# Patient Record
Sex: MALE | Race: WHITE | URBAN - METROPOLITAN AREA
[De-identification: names, ages, dates, MRNs, and addresses within clinical notes are randomized per-mention and may not be internally consistent; named-entity substitution may affect disease eponyms.]

---

## 2017-03-23 PROBLEM — Z00.00 ENCOUNTER FOR PREVENTIVE HEALTH EXAMINATION: Status: ACTIVE | Noted: 2017-03-23

## 2017-12-29 ENCOUNTER — EMERGENCY (EMERGENCY)
Facility: HOSPITAL | Age: 67
LOS: 0 days | Discharge: HOME | End: 2017-12-30
Attending: EMERGENCY MEDICINE | Admitting: EMERGENCY MEDICINE
Payer: COMMERCIAL

## 2017-12-29 VITALS
SYSTOLIC BLOOD PRESSURE: 146 MMHG | DIASTOLIC BLOOD PRESSURE: 85 MMHG | TEMPERATURE: 98 F | OXYGEN SATURATION: 100 % | HEART RATE: 85 BPM | HEIGHT: 70 IN | WEIGHT: 212.08 LBS | RESPIRATION RATE: 18 BRPM

## 2017-12-29 DIAGNOSIS — R42 DIZZINESS AND GIDDINESS: ICD-10-CM

## 2017-12-29 LAB
ALBUMIN SERPL ELPH-MCNC: 3.3 G/DL — SIGNIFICANT CHANGE UP (ref 3.3–5)
ALP SERPL-CCNC: 91 U/L — SIGNIFICANT CHANGE UP (ref 40–120)
ALT FLD-CCNC: 26 U/L — SIGNIFICANT CHANGE UP (ref 12–78)
ANION GAP SERPL CALC-SCNC: 6 MMOL/L — SIGNIFICANT CHANGE UP (ref 5–17)
AST SERPL-CCNC: 14 U/L — LOW (ref 15–37)
BASOPHILS # BLD AUTO: 0.1 K/UL — SIGNIFICANT CHANGE UP (ref 0–0.2)
BASOPHILS NFR BLD AUTO: 0.5 % — SIGNIFICANT CHANGE UP (ref 0–2)
BILIRUB SERPL-MCNC: 0.5 MG/DL — SIGNIFICANT CHANGE UP (ref 0.2–1.2)
BUN SERPL-MCNC: 25 MG/DL — HIGH (ref 7–23)
CALCIUM SERPL-MCNC: 8.7 MG/DL — SIGNIFICANT CHANGE UP (ref 8.5–10.1)
CHLORIDE SERPL-SCNC: 102 MMOL/L — SIGNIFICANT CHANGE UP (ref 96–108)
CO2 SERPL-SCNC: 26 MMOL/L — SIGNIFICANT CHANGE UP (ref 22–31)
CREAT SERPL-MCNC: 0.86 MG/DL — SIGNIFICANT CHANGE UP (ref 0.5–1.3)
EOSINOPHIL # BLD AUTO: 0 K/UL — SIGNIFICANT CHANGE UP (ref 0–0.5)
EOSINOPHIL NFR BLD AUTO: 0.2 % — SIGNIFICANT CHANGE UP (ref 0–6)
GLUCOSE SERPL-MCNC: 118 MG/DL — HIGH (ref 70–99)
HCT VFR BLD CALC: 45.1 % — SIGNIFICANT CHANGE UP (ref 39–50)
HGB BLD-MCNC: 14.8 G/DL — SIGNIFICANT CHANGE UP (ref 13–17)
LYMPHOCYTES # BLD AUTO: 1.1 K/UL — SIGNIFICANT CHANGE UP (ref 1–3.3)
LYMPHOCYTES # BLD AUTO: 8.3 % — LOW (ref 13–44)
MAGNESIUM SERPL-MCNC: 2.2 MG/DL — SIGNIFICANT CHANGE UP (ref 1.6–2.6)
MCHC RBC-ENTMCNC: 28.9 PG — SIGNIFICANT CHANGE UP (ref 27–34)
MCHC RBC-ENTMCNC: 32.7 GM/DL — SIGNIFICANT CHANGE UP (ref 32–36)
MCV RBC AUTO: 88.2 FL — SIGNIFICANT CHANGE UP (ref 80–100)
MONOCYTES # BLD AUTO: 1 K/UL — HIGH (ref 0–0.9)
MONOCYTES NFR BLD AUTO: 7.3 % — SIGNIFICANT CHANGE UP (ref 2–14)
NEUTROPHILS # BLD AUTO: 11.6 K/UL — HIGH (ref 1.8–7.4)
NEUTROPHILS NFR BLD AUTO: 83.7 % — HIGH (ref 43–77)
PHOSPHATE SERPL-MCNC: 2.5 MG/DL — SIGNIFICANT CHANGE UP (ref 2.5–4.5)
PLATELET # BLD AUTO: 265 K/UL — SIGNIFICANT CHANGE UP (ref 150–400)
POTASSIUM SERPL-MCNC: 4.2 MMOL/L — SIGNIFICANT CHANGE UP (ref 3.5–5.3)
POTASSIUM SERPL-SCNC: 4.2 MMOL/L — SIGNIFICANT CHANGE UP (ref 3.5–5.3)
PROT SERPL-MCNC: 6.8 GM/DL — SIGNIFICANT CHANGE UP (ref 6–8.3)
RBC # BLD: 5.11 M/UL — SIGNIFICANT CHANGE UP (ref 4.2–5.8)
RBC # FLD: 14.1 % — SIGNIFICANT CHANGE UP (ref 10.3–14.5)
SODIUM SERPL-SCNC: 134 MMOL/L — LOW (ref 135–145)
WBC # BLD: 13.8 K/UL — HIGH (ref 3.8–10.5)
WBC # FLD AUTO: 13.8 K/UL — HIGH (ref 3.8–10.5)

## 2017-12-29 PROCEDURE — 93010 ELECTROCARDIOGRAM REPORT: CPT

## 2017-12-29 PROCEDURE — 99283 EMERGENCY DEPT VISIT LOW MDM: CPT

## 2017-12-29 RX ORDER — SODIUM CHLORIDE 9 MG/ML
1000 INJECTION INTRAMUSCULAR; INTRAVENOUS; SUBCUTANEOUS ONCE
Qty: 0 | Refills: 0 | Status: COMPLETED | OUTPATIENT
Start: 2017-12-29 | End: 2017-12-29

## 2017-12-29 RX ADMIN — SODIUM CHLORIDE 1000 MILLILITER(S): 9 INJECTION INTRAMUSCULAR; INTRAVENOUS; SUBCUTANEOUS at 22:23

## 2017-12-29 NOTE — ED PROVIDER NOTE - MEDICAL DECISION MAKING DETAILS
labs, EKG, fluids Mild leukocytosis, likely 2/2 recent URI, on prednisone.  Mild EDWARD.  EKG nonischemic, similar to prior from December 15.  Pt feeling better, asymptomatic VS WNL.  Okay for d/c home at this time.

## 2017-12-29 NOTE — ED ADULT NURSE NOTE - NS ED NURSE DC INFO COMPLEXITY
Patient asked questions/Straightforward: Basic instructions, no meds, no home treatment/Simple: Patient demonstrates quick and easy understanding/Verbalized Understanding

## 2017-12-29 NOTE — ED PROVIDER NOTE - OBJECTIVE STATEMENT
68 y/o male with PMHx of HTN, HLD presents to the ED c/o HTN of 166/110. Pt states his fingers were cramping and he felt dizzy. +lightheadedness. Adequate PO intake. Pt feels better at this time. Notes he is dealing with a lot of stress in his life. Is on Lisinopril. No hx of DM.

## 2017-12-29 NOTE — ED ADULT NURSE NOTE - OBJECTIVE STATEMENT
Pt presents to ER c/o HTN, camping hands and stress. Pt reports decreased sleep since Wednesday and increased stress at home, pt began to feeling cramps in b/l hands and took bp at home and it was elevated. neuro intact in b/l upper and lower extremity, good peripheral sensation. Denies headache/ blurry vision. AO x 3 oriented to baseline, normal breathing pattern with no difficulty.

## 2019-06-23 ENCOUNTER — EMERGENCY (EMERGENCY)
Facility: HOSPITAL | Age: 69
LOS: 0 days | Discharge: HOME | End: 2019-06-23
Attending: EMERGENCY MEDICINE | Admitting: EMERGENCY MEDICINE
Payer: COMMERCIAL

## 2019-06-23 VITALS
DIASTOLIC BLOOD PRESSURE: 87 MMHG | HEART RATE: 79 BPM | OXYGEN SATURATION: 97 % | RESPIRATION RATE: 16 BRPM | TEMPERATURE: 98 F | SYSTOLIC BLOOD PRESSURE: 157 MMHG

## 2019-06-23 VITALS — HEIGHT: 70 IN | WEIGHT: 205.03 LBS

## 2019-06-23 DIAGNOSIS — Z88.0 ALLERGY STATUS TO PENICILLIN: ICD-10-CM

## 2019-06-23 DIAGNOSIS — R10.32 LEFT LOWER QUADRANT PAIN: ICD-10-CM

## 2019-06-23 DIAGNOSIS — N13.2 HYDRONEPHROSIS WITH RENAL AND URETERAL CALCULOUS OBSTRUCTION: ICD-10-CM

## 2019-06-23 DIAGNOSIS — I10 ESSENTIAL (PRIMARY) HYPERTENSION: ICD-10-CM

## 2019-06-23 DIAGNOSIS — E78.5 HYPERLIPIDEMIA, UNSPECIFIED: ICD-10-CM

## 2019-06-23 DIAGNOSIS — Z87.442 PERSONAL HISTORY OF URINARY CALCULI: ICD-10-CM

## 2019-06-23 LAB
ALBUMIN SERPL ELPH-MCNC: 3.8 G/DL — SIGNIFICANT CHANGE UP (ref 3.3–5)
ALP SERPL-CCNC: 123 U/L — HIGH (ref 40–120)
ALT FLD-CCNC: 26 U/L — SIGNIFICANT CHANGE UP (ref 12–78)
ANION GAP SERPL CALC-SCNC: 5 MMOL/L — SIGNIFICANT CHANGE UP (ref 5–17)
APPEARANCE UR: CLEAR — SIGNIFICANT CHANGE UP
AST SERPL-CCNC: 21 U/L — SIGNIFICANT CHANGE UP (ref 15–37)
BACTERIA # UR AUTO: ABNORMAL
BILIRUB SERPL-MCNC: 0.4 MG/DL — SIGNIFICANT CHANGE UP (ref 0.2–1.2)
BILIRUB UR-MCNC: NEGATIVE — SIGNIFICANT CHANGE UP
BUN SERPL-MCNC: 24 MG/DL — HIGH (ref 7–23)
CALCIUM SERPL-MCNC: 9.3 MG/DL — SIGNIFICANT CHANGE UP (ref 8.5–10.1)
CHLORIDE SERPL-SCNC: 103 MMOL/L — SIGNIFICANT CHANGE UP (ref 96–108)
CO2 SERPL-SCNC: 28 MMOL/L — SIGNIFICANT CHANGE UP (ref 22–31)
COLOR SPEC: YELLOW — SIGNIFICANT CHANGE UP
CREAT SERPL-MCNC: 1.29 MG/DL — SIGNIFICANT CHANGE UP (ref 0.5–1.3)
DIFF PNL FLD: ABNORMAL
EPI CELLS # UR: SIGNIFICANT CHANGE UP
GLUCOSE SERPL-MCNC: 99 MG/DL — SIGNIFICANT CHANGE UP (ref 70–99)
GLUCOSE UR QL: NEGATIVE MG/DL — SIGNIFICANT CHANGE UP
HCT VFR BLD CALC: 43.9 % — SIGNIFICANT CHANGE UP (ref 39–50)
HGB BLD-MCNC: 14.5 G/DL — SIGNIFICANT CHANGE UP (ref 13–17)
KETONES UR-MCNC: NEGATIVE — SIGNIFICANT CHANGE UP
LEUKOCYTE ESTERASE UR-ACNC: NEGATIVE — SIGNIFICANT CHANGE UP
LIDOCAIN IGE QN: 171 U/L — SIGNIFICANT CHANGE UP (ref 73–393)
MCHC RBC-ENTMCNC: 29.5 PG — SIGNIFICANT CHANGE UP (ref 27–34)
MCHC RBC-ENTMCNC: 33 GM/DL — SIGNIFICANT CHANGE UP (ref 32–36)
MCV RBC AUTO: 89.4 FL — SIGNIFICANT CHANGE UP (ref 80–100)
NITRITE UR-MCNC: NEGATIVE — SIGNIFICANT CHANGE UP
PH UR: 6 — SIGNIFICANT CHANGE UP (ref 5–8)
PLATELET # BLD AUTO: 267 K/UL — SIGNIFICANT CHANGE UP (ref 150–400)
POTASSIUM SERPL-MCNC: 4.8 MMOL/L — SIGNIFICANT CHANGE UP (ref 3.5–5.3)
POTASSIUM SERPL-SCNC: 4.8 MMOL/L — SIGNIFICANT CHANGE UP (ref 3.5–5.3)
PROT SERPL-MCNC: 7.8 GM/DL — SIGNIFICANT CHANGE UP (ref 6–8.3)
PROT UR-MCNC: NEGATIVE MG/DL — SIGNIFICANT CHANGE UP
RBC # BLD: 4.91 M/UL — SIGNIFICANT CHANGE UP (ref 4.2–5.8)
RBC # FLD: 13.8 % — SIGNIFICANT CHANGE UP (ref 10.3–14.5)
RBC CASTS # UR COMP ASSIST: ABNORMAL /HPF (ref 0–4)
SODIUM SERPL-SCNC: 136 MMOL/L — SIGNIFICANT CHANGE UP (ref 135–145)
SP GR SPEC: 1.01 — SIGNIFICANT CHANGE UP (ref 1.01–1.02)
UROBILINOGEN FLD QL: NEGATIVE MG/DL — SIGNIFICANT CHANGE UP
WBC # BLD: 12.59 K/UL — HIGH (ref 3.8–10.5)
WBC # FLD AUTO: 12.59 K/UL — HIGH (ref 3.8–10.5)
WBC UR QL: SIGNIFICANT CHANGE UP

## 2019-06-23 PROCEDURE — 74176 CT ABD & PELVIS W/O CONTRAST: CPT | Mod: 26

## 2019-06-23 PROCEDURE — 99284 EMERGENCY DEPT VISIT MOD MDM: CPT | Mod: 25

## 2019-06-23 RX ORDER — TAMSULOSIN HYDROCHLORIDE 0.4 MG/1
1 CAPSULE ORAL
Qty: 10 | Refills: 0
Start: 2019-06-23 | End: 2019-07-02

## 2019-06-23 RX ORDER — ONDANSETRON 8 MG/1
1 TABLET, FILM COATED ORAL
Qty: 30 | Refills: 0
Start: 2019-06-23 | End: 2019-07-02

## 2019-06-23 RX ORDER — KETOROLAC TROMETHAMINE 30 MG/ML
30 SYRINGE (ML) INJECTION ONCE
Refills: 0 | Status: DISCONTINUED | OUTPATIENT
Start: 2019-06-23 | End: 2019-06-23

## 2019-06-23 RX ORDER — KETOROLAC TROMETHAMINE 30 MG/ML
1 SYRINGE (ML) INJECTION
Qty: 21 | Refills: 0
Start: 2019-06-23 | End: 2019-06-29

## 2019-06-23 RX ORDER — SODIUM CHLORIDE 9 MG/ML
1000 INJECTION INTRAMUSCULAR; INTRAVENOUS; SUBCUTANEOUS ONCE
Refills: 0 | Status: COMPLETED | OUTPATIENT
Start: 2019-06-23 | End: 2019-06-23

## 2019-06-23 RX ORDER — TAMSULOSIN HYDROCHLORIDE 0.4 MG/1
0.4 CAPSULE ORAL AT BEDTIME
Refills: 0 | Status: DISCONTINUED | OUTPATIENT
Start: 2019-06-23 | End: 2019-06-23

## 2019-06-23 RX ADMIN — TAMSULOSIN HYDROCHLORIDE 0.4 MILLIGRAM(S): 0.4 CAPSULE ORAL at 22:31

## 2019-06-23 RX ADMIN — Medication 30 MILLIGRAM(S): at 20:46

## 2019-06-23 RX ADMIN — SODIUM CHLORIDE 1000 MILLILITER(S): 9 INJECTION INTRAMUSCULAR; INTRAVENOUS; SUBCUTANEOUS at 20:46

## 2019-06-23 NOTE — ED ADULT NURSE NOTE - NSIMPLEMENTINTERV_GEN_ALL_ED
Implemented All Universal Safety Interventions:  [place] to call system. Call bell, personal items and telephone within reach. Instruct patient to call for assistance. Room bathroom lighting operational. Non-slip footwear when patient is off stretcher. Physically safe environment: no spills, clutter or unnecessary equipment. Stretcher in lowest position, wheels locked, appropriate side rails in place.

## 2019-06-23 NOTE — ED STATDOCS - CARE PROVIDER_API CALL
Yun, SukHyeon (MD)  Nephrology  [address], [address]  [place] [address]  Phone: [phone]  Fax: [phone]  Follow Up Time:

## 2019-06-23 NOTE — ED STATDOCS - OBJECTIVE STATEMENT
69 y/o male with a PMHx of HTN, HLD presents to the ED c/o left flank pain since 9:30am this morning. +burning urination. No blood in urine. Denies vomiting. Hx of same flank pain before due to a kidney stones. Hx of surgery for hernia. Denies taking pain medication PTA. PCP: Dr. Rodriguez.

## 2019-06-23 NOTE — ED ADULT NURSE NOTE - OBJECTIVE STATEMENT
Patient presents to ED complaining of flank pain. Patient complaining of non radiating L flank pain x 1 day and burning with urination. Patient denies NVD, fevers, chills. PMHx of kidney stones, patient states he has had similar symptoms in past.

## 2019-06-23 NOTE — ED STATDOCS - ATTENDING CONTRIBUTION TO CARE
Attending Contribution to Care: I, Aimee Mondragon, performed the initial face to face bedside interview with this patient regarding history of present illness, review of symptoms and relevant past medical, social and family history.  I completed an independent physical examination.  I was the initial provider who evaluated this patient. I have signed out the follow up of any pending tests (i.e. labs, radiological studies) to the ACP.  I have communicated the patient’s plan of care and disposition with the ACP.

## 2019-06-23 NOTE — ED STATDOCS - PROGRESS NOTE DETAILS
Pt is a 69 y/o M with PMH HTN, HLD, kidney stones p/w left flank pain since around 9AM with associated burning during urination. Denies blood in urine or stool. Reports nausea but no vomiting. He has not taken anything for pain PTA.   PE: left cva ttp.  plan: labs, ct, pain control, reeval  -Waleska Uribe PA-C ED evaluation and management discussed with the patient and family in detail. Will dc with pain and nausea control, flomax, urology referral.  Close PMD follow up encouraged.  Strict ED return instructions discussed in detail and patient given the opportunity to ask any questions about their discharge diagnosis and instructions. Patient verbalized understanding. -Waleska Uribe PA-C Pt. urinated prior to discharge and passed his stone intact. Pt. now pain free.

## 2019-06-24 PROBLEM — I10 ESSENTIAL (PRIMARY) HYPERTENSION: Chronic | Status: ACTIVE | Noted: 2017-12-30

## 2019-06-24 LAB
CULTURE RESULTS: NO GROWTH — SIGNIFICANT CHANGE UP
SPECIMEN SOURCE: SIGNIFICANT CHANGE UP

## 2019-08-15 ENCOUNTER — APPOINTMENT (OUTPATIENT)
Age: 69
End: 2019-08-15
Payer: COMMERCIAL

## 2019-08-15 VITALS
BODY MASS INDEX: 29.02 KG/M2 | HEIGHT: 70.5 IN | TEMPERATURE: 97.9 F | HEART RATE: 65 BPM | SYSTOLIC BLOOD PRESSURE: 131 MMHG | DIASTOLIC BLOOD PRESSURE: 73 MMHG | OXYGEN SATURATION: 96 % | WEIGHT: 205 LBS

## 2019-08-15 DIAGNOSIS — K46.9 UNSPECIFIED ABDOMINAL HERNIA W/OUT OBSTRUCTION OR GANGRENE: ICD-10-CM

## 2019-08-15 DIAGNOSIS — Z87.442 PERSONAL HISTORY OF URINARY CALCULI: ICD-10-CM

## 2019-08-15 PROCEDURE — 99204 OFFICE O/P NEW MOD 45 MIN: CPT

## 2019-08-15 RX ORDER — NIZATIDINE 150 MG/1
150 CAPSULE ORAL
Refills: 0 | Status: ACTIVE | COMMUNITY

## 2019-08-15 RX ORDER — GEMFIBROZIL 600 MG/1
600 TABLET, FILM COATED ORAL
Refills: 0 | Status: ACTIVE | COMMUNITY

## 2019-08-15 RX ORDER — ZOLPIDEM TARTRATE 12.5 MG/1
12.5 TABLET, EXTENDED RELEASE ORAL
Refills: 0 | Status: ACTIVE | COMMUNITY

## 2019-08-15 NOTE — PHYSICAL EXAM
[General Appearance - Well Developed] : well developed [General Appearance - Well Nourished] : well nourished [Normal Appearance] : normal appearance [Well Groomed] : well groomed [General Appearance - In No Acute Distress] : no acute distress [Edema] : no peripheral edema [Respiration, Rhythm And Depth] : normal respiratory rhythm and effort [Exaggerated Use Of Accessory Muscles For Inspiration] : no accessory muscle use [Abdomen Soft] : soft [Abdomen Tenderness] : non-tender [Costovertebral Angle Tenderness] : no ~M costovertebral angle tenderness [Urethral Meatus] : meatus normal [Penis Abnormality] : normal circumcised penis [Urinary Bladder Findings] : the bladder was normal on palpation [Scrotum] : the scrotum was normal [Testes Tenderness] : no tenderness of the testes [Testes Mass (___cm)] : there were no testicular masses [Prostate Tenderness] : the prostate was not tender [No Prostate Nodules] : no prostate nodules [Prostate Size ___ gm] : prostate size [unfilled] gm [Normal Station and Gait] : the gait and station were normal for the patient's age [] : no rash [No Focal Deficits] : no focal deficits [Oriented To Time, Place, And Person] : oriented to person, place, and time [Affect] : the affect was normal [Mood] : the mood was normal [Not Anxious] : not anxious [No Palpable Adenopathy] : no palpable adenopathy

## 2019-08-15 NOTE — ASSESSMENT
[FreeTextEntry1] : 67 yo M with LEFT renal colic and 2 mm UVJ stone. Likely passed stone. Residual pain may be another stone, as imaging did show he had others. Suspect that this is simply some pain from passage of initial stone. Recommended he continue tamsulosin that he was given by ER. Rx refill given. This may also help his LUTS, though pt does not want long term medication for LUTS. Will obtain renal US and KUB to confirm passage and rule out additional obstructing stone.

## 2019-08-15 NOTE — REVIEW OF SYSTEMS
[Feeling Poorly] : feeling poorly [Feeling Tired] : feeling tired [Shortness Of Breath] : shortness of breath [Heartburn] : heartburn [Poor quality erections] : Poor quality erections [Pain during urination] : pain during urination [History of kidney stones] : history of kidney stones [Urine retention] : urine retention [Wake up at night to urinate  How many times?  ___] : wakes up to urinate [unfilled] times during the night [Wait a long time to urinate] : waits a long time to urinate [Slow urine stream] : slow urine stream [Interrupted urine stream] : interrupted urine stream [Itching] : itching [Dizziness] : dizziness [Feelings Of Weakness] : feelings of weakness [Negative] : Heme/Lymph

## 2019-08-15 NOTE — HISTORY OF PRESENT ILLNESS
[FreeTextEntry1] : 68 year old man seen 08/15/2019 with complaint of LEFT ureteral stone. This began days ago, when he presented to  with LEFT flank pain.  He passed stone at  ER in his urine. \par It was severe in severity, but has now nearly resolved. Nothing makes the symptoms better, nothing makes sx worse. He does have some mild residual LEFT flank pain. \par It is associated with nothing. CT at  revealed 2 mm LEFT UVJ stone as well as several intrarenal stones. He reports baseline LUTS of frequency, nocturia, weak stream, and hesitancy. \par No hematuria, no dysuria. No incontinence. No fevers, no chills, no nausea, no vomiting. \par \par No family history contributory to kidney or ureteral stones.

## 2019-08-23 LAB
APPEARANCE: CLEAR
BACTERIA UR CULT: NORMAL
BACTERIA: NEGATIVE
BILIRUBIN URINE: NEGATIVE
BLOOD URINE: NEGATIVE
COLOR: YELLOW
GLUCOSE QUALITATIVE U: NEGATIVE
HYALINE CASTS: 1 /LPF
KETONES URINE: NEGATIVE
LEUKOCYTE ESTERASE URINE: NEGATIVE
MICROSCOPIC-UA: NORMAL
NITRITE URINE: NEGATIVE
PH URINE: 6
PROTEIN URINE: NORMAL
RED BLOOD CELLS URINE: 1 /HPF
SPECIFIC GRAVITY URINE: 1.03
SQUAMOUS EPITHELIAL CELLS: 0 /HPF
UROBILINOGEN URINE: NORMAL
WHITE BLOOD CELLS URINE: 1 /HPF

## 2019-09-17 ENCOUNTER — APPOINTMENT (OUTPATIENT)
Age: 69
End: 2019-09-17
Payer: COMMERCIAL

## 2019-09-17 DIAGNOSIS — N20.0 CALCULUS OF KIDNEY: ICD-10-CM

## 2019-09-17 DIAGNOSIS — N20.1 CALCULUS OF URETER: ICD-10-CM

## 2019-09-17 PROCEDURE — 99213 OFFICE O/P EST LOW 20 MIN: CPT

## 2019-09-19 PROBLEM — N20.0 KIDNEY STONE: Status: ACTIVE | Noted: 2019-08-15

## 2019-09-19 PROBLEM — N20.1 URETERAL STONE: Status: ACTIVE | Noted: 2019-08-15

## 2019-09-19 NOTE — HISTORY OF PRESENT ILLNESS
[FreeTextEntry1] : 68 year old man seen 08/15/2019 with complaint of LEFT ureteral stone. This began days ago, when he presented to  with LEFT flank pain.  He passed stone at  ER in his urine. \par It was severe in severity, but has now nearly resolved. Nothing makes the symptoms better, nothing makes sx worse. He does have some mild residual LEFT flank pain. \par It is associated with nothing. CT at  revealed 2 mm LEFT UVJ stone as well as several intrarenal stones. He reports baseline LUTS of frequency, nocturia, weak stream, and hesitancy. \par No hematuria, no dysuria. No incontinence. No fevers, no chills, no nausea, no vomiting. No family history contributory to kidney or ureteral stones. \par \par 09/17/2019: Patient presents for follow up. He reports  symptoms and other medical  issues remain unchanged from above. KUB xray shows no stone. No hematuria, no dysuria. No incontinence. No fevers, no chills, no nausea, no vomiting.

## 2019-09-19 NOTE — PHYSICAL EXAM
[General Appearance - Well Nourished] : well nourished [General Appearance - Well Developed] : well developed [Normal Appearance] : normal appearance [Well Groomed] : well groomed [General Appearance - In No Acute Distress] : no acute distress [Abdomen Soft] : soft [Abdomen Tenderness] : non-tender [Costovertebral Angle Tenderness] : no ~M costovertebral angle tenderness [Edema] : no peripheral edema [Respiration, Rhythm And Depth] : normal respiratory rhythm and effort [] : no respiratory distress [Oriented To Time, Place, And Person] : oriented to person, place, and time [Exaggerated Use Of Accessory Muscles For Inspiration] : no accessory muscle use [Affect] : the affect was normal [Mood] : the mood was normal [Not Anxious] : not anxious [Normal Station and Gait] : the gait and station were normal for the patient's age [No Focal Deficits] : no focal deficits [No Palpable Adenopathy] : no palpable adenopathy [Urinary Bladder Findings] : the bladder was normal on palpation

## 2019-09-19 NOTE — ASSESSMENT
[FreeTextEntry1] : 69 yo M with LEFT renal colic and 2 mm UVJ stone. Likely passed stone. KUB did not show stones. Pt reports no change in LUTS with tamsulosin and does not want to continue or further work up or treat LUTS.

## 2020-05-14 ENCOUNTER — TRANSCRIPTION ENCOUNTER (OUTPATIENT)
Age: 70
End: 2020-05-14

## 2020-06-01 ENCOUNTER — RX CHANGE (OUTPATIENT)
Age: 70
End: 2020-06-01

## 2020-06-06 ENCOUNTER — TRANSCRIPTION ENCOUNTER (OUTPATIENT)
Age: 70
End: 2020-06-06

## 2020-06-06 ENCOUNTER — EMERGENCY (EMERGENCY)
Facility: HOSPITAL | Age: 70
LOS: 0 days | Discharge: HOME | End: 2020-06-06
Attending: EMERGENCY MEDICINE
Payer: MEDICARE

## 2020-06-06 VITALS — WEIGHT: 194.01 LBS

## 2020-06-06 VITALS — OXYGEN SATURATION: 98 % | DIASTOLIC BLOOD PRESSURE: 69 MMHG | HEART RATE: 67 BPM | SYSTOLIC BLOOD PRESSURE: 137 MMHG

## 2020-06-06 DIAGNOSIS — R42 DIZZINESS AND GIDDINESS: ICD-10-CM

## 2020-06-06 DIAGNOSIS — Z88.0 ALLERGY STATUS TO PENICILLIN: ICD-10-CM

## 2020-06-06 DIAGNOSIS — E78.5 HYPERLIPIDEMIA, UNSPECIFIED: ICD-10-CM

## 2020-06-06 DIAGNOSIS — I10 ESSENTIAL (PRIMARY) HYPERTENSION: ICD-10-CM

## 2020-06-06 DIAGNOSIS — R53.1 WEAKNESS: ICD-10-CM

## 2020-06-06 LAB
ALBUMIN SERPL ELPH-MCNC: 4.1 G/DL — SIGNIFICANT CHANGE UP (ref 3.3–5)
ALP SERPL-CCNC: 101 U/L — SIGNIFICANT CHANGE UP (ref 40–120)
ALT FLD-CCNC: 27 U/L — SIGNIFICANT CHANGE UP (ref 12–78)
ANION GAP SERPL CALC-SCNC: 2 MMOL/L — LOW (ref 5–17)
APPEARANCE UR: CLEAR — SIGNIFICANT CHANGE UP
AST SERPL-CCNC: 13 U/L — LOW (ref 15–37)
BILIRUB SERPL-MCNC: 0.6 MG/DL — SIGNIFICANT CHANGE UP (ref 0.2–1.2)
BILIRUB UR-MCNC: NEGATIVE — SIGNIFICANT CHANGE UP
BUN SERPL-MCNC: 17 MG/DL — SIGNIFICANT CHANGE UP (ref 7–23)
CALCIUM SERPL-MCNC: 10.3 MG/DL — HIGH (ref 8.5–10.1)
CHLORIDE SERPL-SCNC: 104 MMOL/L — SIGNIFICANT CHANGE UP (ref 96–108)
CO2 SERPL-SCNC: 30 MMOL/L — SIGNIFICANT CHANGE UP (ref 22–31)
COLOR SPEC: YELLOW — SIGNIFICANT CHANGE UP
CREAT SERPL-MCNC: 1.09 MG/DL — SIGNIFICANT CHANGE UP (ref 0.5–1.3)
DIFF PNL FLD: NEGATIVE — SIGNIFICANT CHANGE UP
GLUCOSE SERPL-MCNC: 96 MG/DL — SIGNIFICANT CHANGE UP (ref 70–99)
GLUCOSE UR QL: NEGATIVE MG/DL — SIGNIFICANT CHANGE UP
HCT VFR BLD CALC: 44.2 % — SIGNIFICANT CHANGE UP (ref 39–50)
HGB BLD-MCNC: 14.5 G/DL — SIGNIFICANT CHANGE UP (ref 13–17)
KETONES UR-MCNC: NEGATIVE — SIGNIFICANT CHANGE UP
LACTATE SERPL-SCNC: 1.3 MMOL/L — SIGNIFICANT CHANGE UP (ref 0.7–2)
LEUKOCYTE ESTERASE UR-ACNC: NEGATIVE — SIGNIFICANT CHANGE UP
MCHC RBC-ENTMCNC: 29.9 PG — SIGNIFICANT CHANGE UP (ref 27–34)
MCHC RBC-ENTMCNC: 32.8 GM/DL — SIGNIFICANT CHANGE UP (ref 32–36)
MCV RBC AUTO: 91.1 FL — SIGNIFICANT CHANGE UP (ref 80–100)
NITRITE UR-MCNC: NEGATIVE — SIGNIFICANT CHANGE UP
PH UR: 6 — SIGNIFICANT CHANGE UP (ref 5–8)
PLATELET # BLD AUTO: 247 K/UL — SIGNIFICANT CHANGE UP (ref 150–400)
POTASSIUM SERPL-MCNC: 4.8 MMOL/L — SIGNIFICANT CHANGE UP (ref 3.5–5.3)
POTASSIUM SERPL-SCNC: 4.8 MMOL/L — SIGNIFICANT CHANGE UP (ref 3.5–5.3)
PROT SERPL-MCNC: 8.3 GM/DL — SIGNIFICANT CHANGE UP (ref 6–8.3)
PROT UR-MCNC: NEGATIVE MG/DL — SIGNIFICANT CHANGE UP
RBC # BLD: 4.85 M/UL — SIGNIFICANT CHANGE UP (ref 4.2–5.8)
RBC # FLD: 14.1 % — SIGNIFICANT CHANGE UP (ref 10.3–14.5)
SODIUM SERPL-SCNC: 136 MMOL/L — SIGNIFICANT CHANGE UP (ref 135–145)
SP GR SPEC: 1.02 — SIGNIFICANT CHANGE UP (ref 1.01–1.02)
TROPONIN I SERPL-MCNC: <0.015 NG/ML — SIGNIFICANT CHANGE UP (ref 0.01–0.04)
UROBILINOGEN FLD QL: NEGATIVE MG/DL — SIGNIFICANT CHANGE UP
WBC # BLD: 8.73 K/UL — SIGNIFICANT CHANGE UP (ref 3.8–10.5)
WBC # FLD AUTO: 8.73 K/UL — SIGNIFICANT CHANGE UP (ref 3.8–10.5)

## 2020-06-06 PROCEDURE — 99284 EMERGENCY DEPT VISIT MOD MDM: CPT | Mod: 25

## 2020-06-06 PROCEDURE — 83605 ASSAY OF LACTIC ACID: CPT

## 2020-06-06 PROCEDURE — 70450 CT HEAD/BRAIN W/O DYE: CPT | Mod: 26

## 2020-06-06 PROCEDURE — 85027 COMPLETE CBC AUTOMATED: CPT

## 2020-06-06 PROCEDURE — 84484 ASSAY OF TROPONIN QUANT: CPT

## 2020-06-06 PROCEDURE — 36415 COLL VENOUS BLD VENIPUNCTURE: CPT

## 2020-06-06 PROCEDURE — 71045 X-RAY EXAM CHEST 1 VIEW: CPT

## 2020-06-06 PROCEDURE — 80053 COMPREHEN METABOLIC PANEL: CPT

## 2020-06-06 PROCEDURE — 70450 CT HEAD/BRAIN W/O DYE: CPT

## 2020-06-06 PROCEDURE — 71045 X-RAY EXAM CHEST 1 VIEW: CPT | Mod: 26

## 2020-06-06 PROCEDURE — 81003 URINALYSIS AUTO W/O SCOPE: CPT

## 2020-06-06 PROCEDURE — 93010 ELECTROCARDIOGRAM REPORT: CPT

## 2020-06-06 PROCEDURE — 99284 EMERGENCY DEPT VISIT MOD MDM: CPT

## 2020-06-06 PROCEDURE — 93005 ELECTROCARDIOGRAM TRACING: CPT

## 2020-06-06 RX ORDER — SODIUM CHLORIDE 9 MG/ML
1000 INJECTION INTRAMUSCULAR; INTRAVENOUS; SUBCUTANEOUS ONCE
Refills: 0 | Status: COMPLETED | OUTPATIENT
Start: 2020-06-06 | End: 2020-06-06

## 2020-06-06 RX ADMIN — SODIUM CHLORIDE 1000 MILLILITER(S): 9 INJECTION INTRAMUSCULAR; INTRAVENOUS; SUBCUTANEOUS at 18:02

## 2020-06-06 NOTE — ED STATDOCS - PATIENT PORTAL LINK FT
You can access the FollowMyHealth Patient Portal offered by [facility] by registering at the following website: [email or web address]. By joining [employer]’s FollowMyHealth portal, you will also be able to view your health information using other applications (apps) compatible with our system.

## 2020-06-06 NOTE — ED STATDOCS - OBJECTIVE STATEMENT
68 y/o m with a PMHx of HTN presents to ED c/o dizziness and weakness x3 days, noted bp was 80/50 this am at home. Pt went to [facility] urgent care, had EKG with sinus arrythmia, normal bp, denies infectious sx, cough, fevers, and chills. No chest pain or SOB.

## 2020-06-06 NOTE — ED ADULT NURSE NOTE - NSIMPLEMENTINTERV_GEN_ALL_ED
Implemented All Universal Safety Interventions:  [place] to call system. Call bell, personal items and telephone within reach. Instruct patient to call for assistance. Room bathroom lighting operational. Non-slip footwear when patient is off stretcher. Physically safe environment: no spills, clutter or unnecessary equipment. Stretcher in lowest position, wheels locked, appropriate side rails in place.

## 2020-06-06 NOTE — ED ADULT NURSE NOTE - OBJECTIVE STATEMENT
Pt reports feeling dizzy and lightheaded since am.  Pt states "I took my blood pressure with a machine I have at home and was found to be low".  Vitals as noted.

## 2020-06-06 NOTE — ED STATDOCS - PROGRESS NOTE DETAILS
70 yo male with a PMH of htn, hld presents with dizziness with fatigue x 3 days. Pt states he took his BP after taking his meds this morning and had a reading of 80/50. Was seen at [facility] and advised to go to the ER for further evaluation. Denies fever, cp, sob, abd pain, n/v/d. Labs, EKG, CT head, CXR, Reeval. -Tramaine Perla PA-C   Cardio= prochi Pt feeling better. BP improved to 137/69. Labs WNL and imaging studies unremarkable. Will d/c to f/u with pmd and cardio. Pt to see Dr. Ceron on 6/11. -Tramaine Perla PA-C Pt has had intentional weight loss recently, may have decreased need for BP meds.  Not tachycardic.  NO infectious symptoms or findings on infectious w/u here.  Pt well appearing.  AVSS s/p fluids.  Asymptomatic throughout ed stay.  No focal neuro deficits and exam not c/w vertigo.  Has sinus arrythmia on ekg, but unchanged from EKG in past and no e/o dangerous underlying arrythmia and without ekg changes from 2017.  Denies cp/palp/sob.  Has close f/u.  Feels comfortable with d/c home.  D/c home with strict return precautions and prompt outpatient f/u.

## 2020-06-08 ENCOUNTER — EMERGENCY (EMERGENCY)
Facility: HOSPITAL | Age: 70
LOS: 0 days | Discharge: HOME | End: 2020-06-08
Attending: EMERGENCY MEDICINE
Payer: MEDICARE

## 2020-06-08 VITALS
HEART RATE: 62 BPM | SYSTOLIC BLOOD PRESSURE: 152 MMHG | OXYGEN SATURATION: 100 % | RESPIRATION RATE: 18 BRPM | TEMPERATURE: 98 F | DIASTOLIC BLOOD PRESSURE: 70 MMHG

## 2020-06-08 VITALS — WEIGHT: 192.02 LBS | HEIGHT: 70 IN

## 2020-06-08 DIAGNOSIS — R42 DIZZINESS AND GIDDINESS: ICD-10-CM

## 2020-06-08 DIAGNOSIS — R53.1 WEAKNESS: ICD-10-CM

## 2020-06-08 DIAGNOSIS — E78.5 HYPERLIPIDEMIA, UNSPECIFIED: ICD-10-CM

## 2020-06-08 DIAGNOSIS — R06.02 SHORTNESS OF BREATH: ICD-10-CM

## 2020-06-08 DIAGNOSIS — I10 ESSENTIAL (PRIMARY) HYPERTENSION: ICD-10-CM

## 2020-06-08 DIAGNOSIS — Z88.0 ALLERGY STATUS TO PENICILLIN: ICD-10-CM

## 2020-06-08 DIAGNOSIS — Z86.19 PERSONAL HISTORY OF OTHER INFECTIOUS AND PARASITIC DISEASES: ICD-10-CM

## 2020-06-08 DIAGNOSIS — R76.0 RAISED ANTIBODY TITER: ICD-10-CM

## 2020-06-08 DIAGNOSIS — R07.89 OTHER CHEST PAIN: ICD-10-CM

## 2020-06-08 LAB
ADD ON TEST-SPECIMEN IN LAB: SIGNIFICANT CHANGE UP
ALBUMIN SERPL ELPH-MCNC: 4 G/DL — SIGNIFICANT CHANGE UP (ref 3.3–5)
ALP SERPL-CCNC: 103 U/L — SIGNIFICANT CHANGE UP (ref 40–120)
ALT FLD-CCNC: 25 U/L — SIGNIFICANT CHANGE UP (ref 12–78)
ANION GAP SERPL CALC-SCNC: 4 MMOL/L — LOW (ref 5–17)
APTT BLD: 42.1 SEC — HIGH (ref 27.5–36.3)
AST SERPL-CCNC: 14 U/L — LOW (ref 15–37)
BILIRUB SERPL-MCNC: 0.5 MG/DL — SIGNIFICANT CHANGE UP (ref 0.2–1.2)
BUN SERPL-MCNC: 13 MG/DL — SIGNIFICANT CHANGE UP (ref 7–23)
CALCIUM SERPL-MCNC: 9.6 MG/DL — SIGNIFICANT CHANGE UP (ref 8.5–10.1)
CHLORIDE SERPL-SCNC: 103 MMOL/L — SIGNIFICANT CHANGE UP (ref 96–108)
CO2 SERPL-SCNC: 28 MMOL/L — SIGNIFICANT CHANGE UP (ref 22–31)
CREAT SERPL-MCNC: 0.95 MG/DL — SIGNIFICANT CHANGE UP (ref 0.5–1.3)
GLUCOSE SERPL-MCNC: 101 MG/DL — HIGH (ref 70–99)
HCT VFR BLD CALC: 42.3 % — SIGNIFICANT CHANGE UP (ref 39–50)
HGB BLD-MCNC: 13.9 G/DL — SIGNIFICANT CHANGE UP (ref 13–17)
INR BLD: 1.11 RATIO — SIGNIFICANT CHANGE UP (ref 0.88–1.16)
MCHC RBC-ENTMCNC: 29.8 PG — SIGNIFICANT CHANGE UP (ref 27–34)
MCHC RBC-ENTMCNC: 32.9 GM/DL — SIGNIFICANT CHANGE UP (ref 32–36)
MCV RBC AUTO: 90.8 FL — SIGNIFICANT CHANGE UP (ref 80–100)
PLATELET # BLD AUTO: 224 K/UL — SIGNIFICANT CHANGE UP (ref 150–400)
POTASSIUM SERPL-MCNC: 4.4 MMOL/L — SIGNIFICANT CHANGE UP (ref 3.5–5.3)
POTASSIUM SERPL-SCNC: 4.4 MMOL/L — SIGNIFICANT CHANGE UP (ref 3.5–5.3)
PROT SERPL-MCNC: 8.1 GM/DL — SIGNIFICANT CHANGE UP (ref 6–8.3)
PROTHROM AB SERPL-ACNC: 12.4 SEC — SIGNIFICANT CHANGE UP (ref 10–12.9)
RBC # BLD: 4.66 M/UL — SIGNIFICANT CHANGE UP (ref 4.2–5.8)
RBC # FLD: 14.1 % — SIGNIFICANT CHANGE UP (ref 10.3–14.5)
SODIUM SERPL-SCNC: 135 MMOL/L — SIGNIFICANT CHANGE UP (ref 135–145)
TROPONIN I SERPL-MCNC: <0.015 NG/ML — SIGNIFICANT CHANGE UP (ref 0.01–0.04)
TROPONIN I SERPL-MCNC: <0.015 NG/ML — SIGNIFICANT CHANGE UP (ref 0.01–0.04)
WBC # BLD: 7.3 K/UL — SIGNIFICANT CHANGE UP (ref 3.8–10.5)
WBC # FLD AUTO: 7.3 K/UL — SIGNIFICANT CHANGE UP (ref 3.8–10.5)

## 2020-06-08 PROCEDURE — 93005 ELECTROCARDIOGRAM TRACING: CPT

## 2020-06-08 PROCEDURE — 99285 EMERGENCY DEPT VISIT HI MDM: CPT

## 2020-06-08 PROCEDURE — 36415 COLL VENOUS BLD VENIPUNCTURE: CPT

## 2020-06-08 PROCEDURE — 85730 THROMBOPLASTIN TIME PARTIAL: CPT

## 2020-06-08 PROCEDURE — 93010 ELECTROCARDIOGRAM REPORT: CPT

## 2020-06-08 PROCEDURE — 84484 ASSAY OF TROPONIN QUANT: CPT

## 2020-06-08 PROCEDURE — 99284 EMERGENCY DEPT VISIT MOD MDM: CPT

## 2020-06-08 PROCEDURE — 85610 PROTHROMBIN TIME: CPT

## 2020-06-08 PROCEDURE — 85027 COMPLETE CBC AUTOMATED: CPT

## 2020-06-08 PROCEDURE — 80053 COMPREHEN METABOLIC PANEL: CPT

## 2020-06-08 PROCEDURE — 85379 FIBRIN DEGRADATION QUANT: CPT

## 2020-06-08 NOTE — ED STATDOCS - PROGRESS NOTE DETAILS
signed Rocio Smith PA-C Pt seen initially in intake by Dr. Sarah   69M here for dizziness/CP for the past week, worsened today. pt see for same symptoms on 6/6 in ED, workup negative including EKG, CT head, troponin. Pt alret NAD. No significant findings on labs, imaging, or EKG today. I spoke to pts card Dr Ceron who will see pt in ED at 5pm to eval whether pt should be admitted or not. Pt agrees with plan of  care. signed Rocio Smith PA-C   No significant findings on labs, imaging, or EKG. Pt seen in ED by Dr Ceron, advises pt may DC home, outpt f/u. return precautions given. Pt feeling well at DC, agrees with DC and plan of care.

## 2020-06-08 NOTE — ED STATDOCS - CHPI ED RELIEVING FACTORS
1200- patient presents to L&D from [facility]. Patient stated she has been leaking clear fluid since Friday 12/1 at 0200. Patient was seen at [facility] where they did a sterile spec and Nitrazine and was found to be ruptured. Dr Heriberto Suarez updated and orders received. EFM and TOCO applied. Positive fetal heart tones. Patient states she has contractions every 15-10 minutes. States positive fetal movement.     1245-  Pitocin started at 1 munit. Dr Heriberto Suarez updated order received to start antibiotics.     1540- Dr Shah at bedside. Updated on patient's uterine contractions. SVE 3-4/70/-2    1800- Dr Heriberto Suarez in department. Updated on patient status. Will continue with plan of care    1900- report given to BHAVESH Manning RN. Plan of care discussed.    nothing

## 2020-06-08 NOTE — ED ADULT NURSE NOTE - OBJECTIVE STATEMENT
Pt reports that since he had covid in march he has been having episodes of feeling SOB or light headed. Pt denies syncope. Pt reports that sometimes he will have pressure in chest that spreads to back.  Pt reports that for past few days symptoms have been more persistent.  EKG and labs as ordered with cardiac monitor in place.  Pt reports feeling fatigued, but denies any focal weakness. Pt reports that since he had covid in march he has been having episodes of feeling SOB or light headed. Pt denies syncope. Pt reports that sometimes he will have pressure in chest that spreads to back.  Pt reports that for past few days symptoms have been more persistent.  EKG and labs as ordered with cardiac monitor in place.  Pt reports feeling fatigued, but denies any focal weakness.  Pt verbalizes understanding of safety precautions and to ask for assistance ambulating for safety.

## 2020-06-08 NOTE — ED STATDOCS - OBJECTIVE STATEMENT
68 y/o male with a PMHx of HTN, HLD presents to the ED c/o SOB and weakness. Pt was seen in ED 2 days ago for dizziness and weakness, had full workup then was d/c. Denies CP, fevers. Pt was COVID positive in March, tested negative 2 weeks ago, +antibodies. Pt has follow up with Dr. Ceron on 6/11/20 for echo. No other complaints at this time.

## 2020-06-08 NOTE — ED ADULT NURSE REASSESSMENT NOTE - NS ED NURSE REASSESS COMMENT FT1
Pt was seen by Dr Ceron in the Ed.pt was d/c .pt was asking to speak to the cardiologist again pt does not feel comfortable to go home notified Fani FRAUSTO.

## 2020-06-08 NOTE — ED STATDOCS - NSFOLLOWUPINSTRUCTIONS_ED_ALL_ED_FT

## 2020-06-08 NOTE — ED ADULT NURSE NOTE - NSIMPLEMENTINTERV_GEN_ALL_ED
Implemented All Fall Risk Interventions:  [place] to call system. Call bell, personal items and telephone within reach. Instruct patient to call for assistance. Room bathroom lighting operational. Non-slip footwear when patient is off stretcher. Physically safe environment: no spills, clutter or unnecessary equipment. Stretcher in lowest position, wheels locked, appropriate side rails in place. Provide visual cue, wrist band, yellow gown, etc. Monitor gait and stability. Monitor for mental status changes and reorient to person, place, and time. Review medications for side effects contributing to fall risk. Reinforce activity limits and safety measures with patient and family.

## 2020-06-08 NOTE — CONSULT NOTE ADULT - ASSESSMENT
Chest discomfort- Chest pain is atypical in nature. So far cardiac enzymes and EKG did not reveal any ischemia.   Outpt ischemic heart disease evaluation recommended.  Pt to call office in am to schedule to see me.   he has echo scheduled to be done already after his recent visit with me in office.  advised him to seek medical attention right away if symptoms recur,pt expressed full understanding.   DC planning  Discussed with ER staff.    Other medical issues- Management per primary team.   Thank you for allowing me to participate in the care of this patient. Please feel free to contact me with any questions.

## 2020-06-08 NOTE — ED ADULT NURSE NOTE - NURSING MUSC ROM
See Media - already done at his appt. Printed - please fax back.
full range of motion in all extremities

## 2020-06-08 NOTE — ED STATDOCS - PATIENT PORTAL LINK FT
You can access the FollowMyHealth Patient Portal offered by [facility] by registering at the following website: [email or web address]. By joining [employer]’s FollowMyHealth portal, you will also be able to view your health information using other applications (apps) compatible with our system.

## 2020-06-08 NOTE — ED STATDOCS - CARE PROVIDER_API CALL
Ai Luna  CARDIOVASCULAR DISEASE  [address] [place] [address]  Phone: [phone]  Fax: [phone]  Follow Up Time:

## 2020-06-08 NOTE — CONSULT NOTE ADULT - SUBJECTIVE AND OBJECTIVE BOX
Patient is a 69y old  Male who presents with a chief complaint of chest discomfort.     HPI: 69yom with pmh as stated below presented to ER 2 days ago and was noted to have low BP and he was given IVF and DCed home.  He states that he lost 10 pounds wt with dietary modifications recently .  he states that his BP other days was normal at home.  Today am his BP readings were in 120 range systolic at home after he took lisinopril in am.  He states that later he had a stress ful conversation with sister and started experiencing chest discomfort in his lower chest which felt like indigestion and presented to the ER.  he states that it is same intensity since presentation and it was not worse with ambulation to rest room in ER.  His BP noted to be elevated in ER.          PAST MEDICAL & SURGICAL HISTORY:  HLD (hyperlipidemia)  HTN (hypertension)      MEDICATIONS  (STANDING):    MEDICATIONS  (PRN):      FAMILY HISTORY:      SOCIAL HISTORY: no recent smoking recently.     REVIEW OF SYSTEMS:  CONSTITUTIONAL:    No fatigue, malaise, lethargy.  No fever or chills.     RESPIRATORY:  No cough.  No wheeze.  No hemoptysis.  No shortness of breath.  CARDIOVASCULAR:  c/o chest pains.  No palpitations. No shortness of breath, No orthopnea or PND.  GASTROINTESTINAL:  No abdominal pain.  No nausea or vomiting.    GENITOURINARY:    No hematuria.    MUSCULOSKELETAL:  No musculoskeletal pain.  No joint swelling.  No arthritis.  NEUROLOGICAL:  No tingling or numbness or weakness.  c/o full feeling in head but no dizziness.   PSYCHIATRIC:  No confusion  SKIN:  No rashes.    .          Vital Signs Last 24 Hrs  T(C): 36.7 (08 Jun 2020 15:16), Max: 36.7 (08 Jun 2020 15:16)  T(F): 98 (08 Jun 2020 15:16), Max: 98 (08 Jun 2020 15:16)  HR: 62 (08 Jun 2020 15:16) (62 - 69)  BP: 152/70 (08 Jun 2020 15:16) (141/81 - 152/70)  BP(mean): --  RR: 18 (08 Jun 2020 15:16) (18 - 18)  SpO2: 100% (08 Jun 2020 15:16) (100% - 100%)    PHYSICAL EXAM-    Constitutional: The patient appears to be normal, well developed, well nourished and alert and oriented to time, place and person. The patient does not appear acutely ill.     Head: Head is normocephalic and atraumatic.      Neck: No jugular venous distention. No audible carotid bruits. There are strong carotid pulses bilaterally. No JVD.     Cardiovascular: Regular rate and rhythm without S3, S4. No murmurs or rubs are appreciated.      Respiratory: Breathsounds are normal. No rales. No wheezing.    Abdomen: Soft, nontender, nondistended with positive bowel sounds.      Extremity: No tenderness. No  pitting edema     Neurologic: The patient is alert and oriented.      Skin: No rash, no obvious lesions noted.      Psychiatric: The patient appears to be emotionally stable.      INTERPRETATION OF TELEMETRY: SR     ECG: Sinus rythm , with 4 beats atrial tachycardia     I&O's Detail      LABS:                        13.9   7.30  )-----------( 224      ( 08 Jun 2020 13:06 )             42.3     06-08    135  |  103  |  13  ----------------------------<  101<H>  4.4   |  28  |  0.95    Ca    9.6      08 Jun 2020 13:06    TPro  8.1  /  Alb  4.0  /  TBili  0.5  /  DBili  x   /  AST  14<L>  /  ALT  25  /  AlkPhos  103  06-08    CARDIAC MARKERS ( 08 Jun 2020 16:00 )  <0.015 ng/mL / x     / x     / x     / x      CARDIAC MARKERS ( 08 Jun 2020 13:06 )  <0.015 ng/mL / x     / x     / x     / x          PT/INR - ( 08 Jun 2020 13:06 )   PT: 12.4 sec;   INR: 1.11 ratio         PTT - ( 08 Jun 2020 13:06 )  PTT:42.1 sec    I&O's Summary    BNP  RADIOLOGY & ADDITIONAL STUDIES:  < from: Xray Chest 1 View-PORTABLE IMMEDIATE (06.06.20 @ 17:33) >    EXAM:  XR CHEST PORTABLE IMMED 1V                            PROCEDURE DATE:  06/06/2020          INTERPRETATION:  XR CHEST IMMEDIATE    Single AP view    HISTORY:  Chest Pain    Comparison: Chest x-ray 12/15/2012      The cardiac silhouette is within normal limits. The lungs are clear. No pleural abnormality.    IMPRESSION: Normal AP chest.                BRENT REID M.D., ATTENDING RADIOLOGIST  This document has been electronically signed. Jun 6 2020  5:50PM        < end of copied text >  < from: CT Head No Cont (06.06.20 @ 17:52) >    EXAM:  CT BRAIN                            PROCEDURE DATE:  06/06/2020          INTERPRETATION:  CLINICAL INDICATION: Dizziness.    TECHNIQUE: CT of the head was performed without the administration of intravenous contrast.    COMPARISON: CT head 12/15/2012.    FINDINGS:    There is no evidence of acute infarction, intracranial hemorrhage or mass lesion.     There is hypoattenuation of the subcortical and periventricular white matter, which is nonspecific finding, but most likely represents sequela of chronic microvascular ischemic disease. There are atherosclerotic calcifications of the cavernous and supraclinoid internal carotid arteries bilaterally. There is mild diffuse prominence of the cortical sulci related to underlying brain parenchymal volume loss. There is no evidence of hydrocephalus. There are no extra-axial fluid collections.    The visualized intraorbital contents are normal. The imaged portions of the paranasal sinuses are aerated. The mastoid air cells are clear. The visualized soft tissues and osseous structures appear unremarkable.      IMPRESSION:    No acute intracranial findings.                FUNMILAYO WASHINGTON M.D., ATTENDING RADIOLOGIST  This document has been electronically signed. Jun 6 2020  6:00PM        < end of copied text >

## 2020-06-14 ENCOUNTER — INPATIENT (INPATIENT)
Facility: HOSPITAL | Age: 70
LOS: 1 days | Discharge: ANOTHER HOSPITAL | DRG: 287 | End: 2020-06-16
Attending: HOSPITALIST | Admitting: HOSPITALIST
Payer: COMMERCIAL

## 2020-06-14 VITALS
RESPIRATION RATE: 16 BRPM | DIASTOLIC BLOOD PRESSURE: 78 MMHG | OXYGEN SATURATION: 98 % | SYSTOLIC BLOOD PRESSURE: 152 MMHG | HEIGHT: 78 IN | HEART RATE: 84 BPM | TEMPERATURE: 98 F | WEIGHT: 188.05 LBS

## 2020-06-14 DIAGNOSIS — R42 DIZZINESS AND GIDDINESS: ICD-10-CM

## 2020-06-14 DIAGNOSIS — Z98.890 OTHER SPECIFIED POSTPROCEDURAL STATES: Chronic | ICD-10-CM

## 2020-06-14 LAB
ALBUMIN SERPL ELPH-MCNC: 4.2 G/DL — SIGNIFICANT CHANGE UP (ref 3.3–5)
ALP SERPL-CCNC: 101 U/L — SIGNIFICANT CHANGE UP (ref 40–120)
ALT FLD-CCNC: 26 U/L — SIGNIFICANT CHANGE UP (ref 12–78)
ANION GAP SERPL CALC-SCNC: 4 MMOL/L — LOW (ref 5–17)
AST SERPL-CCNC: 12 U/L — LOW (ref 15–37)
BILIRUB SERPL-MCNC: 0.7 MG/DL — SIGNIFICANT CHANGE UP (ref 0.2–1.2)
BUN SERPL-MCNC: 14 MG/DL — SIGNIFICANT CHANGE UP (ref 7–23)
CALCIUM SERPL-MCNC: 9.7 MG/DL — SIGNIFICANT CHANGE UP (ref 8.5–10.1)
CHLORIDE SERPL-SCNC: 103 MMOL/L — SIGNIFICANT CHANGE UP (ref 96–108)
CO2 SERPL-SCNC: 29 MMOL/L — SIGNIFICANT CHANGE UP (ref 22–31)
CREAT SERPL-MCNC: 1.03 MG/DL — SIGNIFICANT CHANGE UP (ref 0.5–1.3)
GLUCOSE SERPL-MCNC: 97 MG/DL — SIGNIFICANT CHANGE UP (ref 70–99)
HCT VFR BLD CALC: 43.7 % — SIGNIFICANT CHANGE UP (ref 39–50)
HGB BLD-MCNC: 14.3 G/DL — SIGNIFICANT CHANGE UP (ref 13–17)
MAGNESIUM SERPL-MCNC: 2.3 MG/DL — SIGNIFICANT CHANGE UP (ref 1.6–2.6)
MCHC RBC-ENTMCNC: 29.5 PG — SIGNIFICANT CHANGE UP (ref 27–34)
MCHC RBC-ENTMCNC: 32.7 GM/DL — SIGNIFICANT CHANGE UP (ref 32–36)
MCV RBC AUTO: 90.1 FL — SIGNIFICANT CHANGE UP (ref 80–100)
PLATELET # BLD AUTO: 242 K/UL — SIGNIFICANT CHANGE UP (ref 150–400)
POTASSIUM SERPL-MCNC: 4.3 MMOL/L — SIGNIFICANT CHANGE UP (ref 3.5–5.3)
POTASSIUM SERPL-SCNC: 4.3 MMOL/L — SIGNIFICANT CHANGE UP (ref 3.5–5.3)
PROT SERPL-MCNC: 8.3 GM/DL — SIGNIFICANT CHANGE UP (ref 6–8.3)
RBC # BLD: 4.85 M/UL — SIGNIFICANT CHANGE UP (ref 4.2–5.8)
RBC # FLD: 13.8 % — SIGNIFICANT CHANGE UP (ref 10.3–14.5)
SODIUM SERPL-SCNC: 136 MMOL/L — SIGNIFICANT CHANGE UP (ref 135–145)
TROPONIN I SERPL-MCNC: <0.015 NG/ML — SIGNIFICANT CHANGE UP (ref 0.01–0.04)
TROPONIN I SERPL-MCNC: <0.015 NG/ML — SIGNIFICANT CHANGE UP (ref 0.01–0.04)
WBC # BLD: 8.01 K/UL — SIGNIFICANT CHANGE UP (ref 3.8–10.5)
WBC # FLD AUTO: 8.01 K/UL — SIGNIFICANT CHANGE UP (ref 3.8–10.5)

## 2020-06-14 PROCEDURE — 99497 ADVNCD CARE PLAN 30 MIN: CPT | Mod: 25

## 2020-06-14 PROCEDURE — 36415 COLL VENOUS BLD VENIPUNCTURE: CPT

## 2020-06-14 PROCEDURE — 84443 ASSAY THYROID STIM HORMONE: CPT

## 2020-06-14 PROCEDURE — 84100 ASSAY OF PHOSPHORUS: CPT

## 2020-06-14 PROCEDURE — 86803 HEPATITIS C AB TEST: CPT

## 2020-06-14 PROCEDURE — 93454 CORONARY ARTERY ANGIO S&I: CPT

## 2020-06-14 PROCEDURE — 85610 PROTHROMBIN TIME: CPT

## 2020-06-14 PROCEDURE — 81003 URINALYSIS AUTO W/O SCOPE: CPT

## 2020-06-14 PROCEDURE — 85730 THROMBOPLASTIN TIME PARTIAL: CPT

## 2020-06-14 PROCEDURE — C1887: CPT

## 2020-06-14 PROCEDURE — 93005 ELECTROCARDIOGRAM TRACING: CPT

## 2020-06-14 PROCEDURE — 84484 ASSAY OF TROPONIN QUANT: CPT

## 2020-06-14 PROCEDURE — 80061 LIPID PANEL: CPT

## 2020-06-14 PROCEDURE — 80048 BASIC METABOLIC PNL TOTAL CA: CPT

## 2020-06-14 PROCEDURE — C1769: CPT

## 2020-06-14 PROCEDURE — 85025 COMPLETE CBC W/AUTO DIFF WBC: CPT

## 2020-06-14 PROCEDURE — 93010 ELECTROCARDIOGRAM REPORT: CPT

## 2020-06-14 PROCEDURE — 85027 COMPLETE CBC AUTOMATED: CPT

## 2020-06-14 PROCEDURE — 71045 X-RAY EXAM CHEST 1 VIEW: CPT | Mod: 26

## 2020-06-14 PROCEDURE — 99223 1ST HOSP IP/OBS HIGH 75: CPT

## 2020-06-14 PROCEDURE — 83036 HEMOGLOBIN GLYCOSYLATED A1C: CPT

## 2020-06-14 PROCEDURE — 83735 ASSAY OF MAGNESIUM: CPT

## 2020-06-14 PROCEDURE — C1894: CPT

## 2020-06-14 RX ORDER — FAMOTIDINE 10 MG/ML
20 INJECTION INTRAVENOUS
Refills: 0 | Status: DISCONTINUED | OUTPATIENT
Start: 2020-06-14 | End: 2020-06-16

## 2020-06-14 RX ORDER — TAMSULOSIN HYDROCHLORIDE 0.4 MG/1
0.4 CAPSULE ORAL AT BEDTIME
Refills: 0 | Status: DISCONTINUED | OUTPATIENT
Start: 2020-06-14 | End: 2020-06-15

## 2020-06-14 RX ORDER — ASPIRIN/CALCIUM CARB/MAGNESIUM 324 MG
81 TABLET ORAL
Refills: 0 | Status: DISCONTINUED | OUTPATIENT
Start: 2020-06-14 | End: 2020-06-16

## 2020-06-14 RX ORDER — GEMFIBROZIL 600 MG
1 TABLET ORAL
Qty: 0 | Refills: 0 | DISCHARGE

## 2020-06-14 RX ORDER — MONTELUKAST 4 MG/1
10 TABLET, CHEWABLE ORAL
Refills: 0 | Status: DISCONTINUED | OUTPATIENT
Start: 2020-06-14 | End: 2020-06-16

## 2020-06-14 RX ORDER — ZOLPIDEM TARTRATE 10 MG/1
5 TABLET ORAL AT BEDTIME
Refills: 0 | Status: DISCONTINUED | OUTPATIENT
Start: 2020-06-14 | End: 2020-06-16

## 2020-06-14 RX ORDER — ATORVASTATIN CALCIUM 80 MG/1
20 TABLET, FILM COATED ORAL AT BEDTIME
Refills: 0 | Status: DISCONTINUED | OUTPATIENT
Start: 2020-06-14 | End: 2020-06-16

## 2020-06-14 RX ORDER — GEMFIBROZIL 600 MG
600 TABLET ORAL
Refills: 0 | Status: DISCONTINUED | OUTPATIENT
Start: 2020-06-14 | End: 2020-06-16

## 2020-06-14 RX ORDER — LISINOPRIL 2.5 MG/1
20 TABLET ORAL
Refills: 0 | Status: DISCONTINUED | OUTPATIENT
Start: 2020-06-15 | End: 2020-06-15

## 2020-06-14 RX ORDER — ASPIRIN/CALCIUM CARB/MAGNESIUM 324 MG
325 TABLET ORAL ONCE
Refills: 0 | Status: COMPLETED | OUTPATIENT
Start: 2020-06-14 | End: 2020-06-14

## 2020-06-14 RX ADMIN — Medication 325 MILLIGRAM(S): at 20:16

## 2020-06-14 RX ADMIN — TAMSULOSIN HYDROCHLORIDE 0.4 MILLIGRAM(S): 0.4 CAPSULE ORAL at 21:07

## 2020-06-14 RX ADMIN — MONTELUKAST 10 MILLIGRAM(S): 4 TABLET, CHEWABLE ORAL at 21:07

## 2020-06-14 RX ADMIN — ZOLPIDEM TARTRATE 5 MILLIGRAM(S): 10 TABLET ORAL at 23:10

## 2020-06-14 NOTE — ED ADULT TRIAGE NOTE - CHIEF COMPLAINT QUOTE
pt c.o dizziness for 1.5 weeks with SOb weakness and lightheadedness, pt states he had a failed stress test on thursday and was advised to go to ED by MD niurka chinchilla chest pain

## 2020-06-14 NOTE — H&P ADULT - ASSESSMENT
68 yo male with pmh/o HTN, HLD, former smoker, with family h/o early MI in father, who had abnormal NST with plan for cath on Friday, admitted for:    #ACS  admit to telemetry  EKG reviewed-NSR, PVC's PAC's noted on monitor  EKG prn chest pain  ASA 325mg po STAT  ASA, ACE, statin continued  hold BB-pt states was taken off  serial troponin  TTE deferred, pt with recent in office  HbA1c  lipid panel  cardiology consulted  fall precautions  K>4, Mg>2  f/u AM cbc, bmp, mg, phos, coags  chest xray w/o acute findings  NPO after midnight  intermittent pneumatic compression for DVT prophylaxis as plan for cath in AM    #HTN/HLD  cont ACE with parameters  cont gemfibrozil and statin  f/u lipid panel  DASH/TLC when made PO    #BPH  cont flomax

## 2020-06-14 NOTE — ED ADULT NURSE REASSESSMENT NOTE - NS ED NURSE REASSESS COMMENT FT1
pt a+o, calm and cooperative, VSS, denies any pain at this time.  pt states that he still feels slightly SOB on exertion, tolerating dinner and liquids at this time.  callbell within reach, will continue to monitor.

## 2020-06-14 NOTE — ED ADULT NURSE NOTE - OBJECTIVE STATEMENT
Pt presents to the ED for evaluation of feeling "unwell" and SOB. Pt states he's been having symptoms similar to this x1.5 weeks, was seen in the ED 3x for similar sx. Pt had outpt stress test with MD Ruffin whom is pt's cardiologist and was told that it was "irregular" and that he had extra beats. Pt states he doesn't have chest pain currently but just feels unwell. Pt states SOB is worsening with exacerbation. Pt has hx of HTN in past. No MI hx.

## 2020-06-14 NOTE — H&P ADULT - NSICDXFAMILYHX_GEN_ALL_CORE_FT
FAMILY HISTORY:  Patient's mother is , and so is father, both from heart disease, father with MI in early 40's

## 2020-06-14 NOTE — H&P ADULT - NSICDXPASTMEDICALHX_GEN_ALL_CORE_FT
PAST MEDICAL HISTORY:  H/O bronchitis COVID+ march 2020    HLD (hyperlipidemia)     HTN (hypertension)     Nephrolithiasis

## 2020-06-14 NOTE — H&P ADULT - HISTORY OF PRESENT ILLNESS
Pt is a 68 yo male with a pmh/o nephrolithiasis, inguinal/umbilical hernia s/p repair, HTN, HLD, bronchitis with h/o COVID infection in March (not hospitalized) who tested negative in May, who has a family history of premature heart disease in father who suffered several MIs' starting in early 40's, who is an ex smoker, who presented to ED today due to 2 weeks of worsening fog like lightheadedness, weakness, decreased exercise tolerance, associated with band like subscapular pressure which occurs with exertion and at rest, is partially alleviated by tylenol, no aggrevating factors, not painful in sensation but rather pressure like, and not position dependent. Pt states he has been to ED twice for same sx and f/u with Dr. Ceron as outpatient for NST and holter monitor and was told friday that he will need an angiogram and that he was found to have extra beats on his holter monitor which were similar to his previous holter test however now 'has extra beats on the bottom and top of heart'. Pt endorses intermittent diaphoresis with episodes. Takes ASA 81mg daily. Of note, pt states this AM with onset of sx his BP was 70/40 and he did not take his BP medication until the afternoon when his BP was high with DBP of 112. Pt normo/hypertensive in Ed.     Denies nausea, vomiting, palpitations, fever, cough, chills, SOB, abd pain, chest pain, HA, visual changes, paresthesias, vertigo, dysuria, diarrhea.

## 2020-06-14 NOTE — H&P ADULT - ATTENDING COMMENTS
17 min spent discussing advanced care planning. Pt wife is . Son Luke is next of kin. Pt states he is full code with his son and sister as proxy should he be unable to make decisions on own.

## 2020-06-14 NOTE — ED PROVIDER NOTE - NS_ ATTENDINGSCRIBEDETAILS _ED_A_ED_FT
I, Frank Dominguez MD,  performed the initial face to face bedside interview with this patient regarding history of present illness, review of symptoms and relevant past medical, social and family history.  I completed an independent physical examination.    The history, relevant review of systems, past medical and surgical history, medical decision making, and physical examination was documented by the scribe in my presence and I attest to the accuracy of the documentation.

## 2020-06-14 NOTE — ED PROVIDER NOTE - OBJECTIVE STATEMENT
68 y/o male with PMHx of HLD, HTN presents to the ED c/o dizziness, weakness and lightheadedness x 1 week. Pt saw cardiologist, Dr. Ceron 6 days ago, reported the doctor read extra beats on EKG. Denies fever. No other complaints at this time. Pt takes lisinopril, but due to low BP reading this morning did not take until approximately 30 minutes ago after a second reading.

## 2020-06-14 NOTE — ED PROVIDER NOTE - PROGRESS NOTE DETAILS
Levi CARROLL for ED attending, Dr. Dominguez: Spoke with Dr. Kebede, covering for Dr. Ceron, who states he will call Dr. Ceron to find out if pt had positive stress test and would recommend admission for cath lab tomorrow instead of Friday.

## 2020-06-15 ENCOUNTER — TRANSCRIPTION ENCOUNTER (OUTPATIENT)
Age: 70
End: 2020-06-15

## 2020-06-15 DIAGNOSIS — Z01.818 ENCOUNTER FOR OTHER PREPROCEDURAL EXAMINATION: ICD-10-CM

## 2020-06-15 LAB
A1C WITH ESTIMATED AVERAGE GLUCOSE RESULT: 5.6 % — SIGNIFICANT CHANGE UP (ref 4–5.6)
ANION GAP SERPL CALC-SCNC: 5 MMOL/L — SIGNIFICANT CHANGE UP (ref 5–17)
APTT BLD: 36 SEC — SIGNIFICANT CHANGE UP (ref 27.5–36.3)
BUN SERPL-MCNC: 16 MG/DL — SIGNIFICANT CHANGE UP (ref 7–23)
CALCIUM SERPL-MCNC: 9.3 MG/DL — SIGNIFICANT CHANGE UP (ref 8.5–10.1)
CHLORIDE SERPL-SCNC: 105 MMOL/L — SIGNIFICANT CHANGE UP (ref 96–108)
CHOLEST SERPL-MCNC: 122 MG/DL — SIGNIFICANT CHANGE UP (ref 10–199)
CO2 SERPL-SCNC: 27 MMOL/L — SIGNIFICANT CHANGE UP (ref 22–31)
CREAT SERPL-MCNC: 0.92 MG/DL — SIGNIFICANT CHANGE UP (ref 0.5–1.3)
ESTIMATED AVERAGE GLUCOSE: 114 MG/DL — SIGNIFICANT CHANGE UP (ref 68–114)
GLUCOSE SERPL-MCNC: 96 MG/DL — SIGNIFICANT CHANGE UP (ref 70–99)
HCT VFR BLD CALC: 41.1 % — SIGNIFICANT CHANGE UP (ref 39–50)
HCV AB S/CO SERPL IA: 0.15 S/CO — SIGNIFICANT CHANGE UP (ref 0–0.99)
HCV AB SERPL-IMP: SIGNIFICANT CHANGE UP
HDLC SERPL-MCNC: 29 MG/DL — LOW
HGB BLD-MCNC: 13.4 G/DL — SIGNIFICANT CHANGE UP (ref 13–17)
INR BLD: 1.17 RATIO — HIGH (ref 0.88–1.16)
LIPID PNL WITH DIRECT LDL SERPL: 73 MG/DL — SIGNIFICANT CHANGE UP
MAGNESIUM SERPL-MCNC: 2.3 MG/DL — SIGNIFICANT CHANGE UP (ref 1.6–2.6)
MCHC RBC-ENTMCNC: 29.8 PG — SIGNIFICANT CHANGE UP (ref 27–34)
MCHC RBC-ENTMCNC: 32.6 GM/DL — SIGNIFICANT CHANGE UP (ref 32–36)
MCV RBC AUTO: 91.5 FL — SIGNIFICANT CHANGE UP (ref 80–100)
PHOSPHATE SERPL-MCNC: 3 MG/DL — SIGNIFICANT CHANGE UP (ref 2.5–4.5)
PLATELET # BLD AUTO: 213 K/UL — SIGNIFICANT CHANGE UP (ref 150–400)
POTASSIUM SERPL-MCNC: 4.4 MMOL/L — SIGNIFICANT CHANGE UP (ref 3.5–5.3)
POTASSIUM SERPL-SCNC: 4.4 MMOL/L — SIGNIFICANT CHANGE UP (ref 3.5–5.3)
PROTHROM AB SERPL-ACNC: 13.1 SEC — HIGH (ref 10–12.9)
RBC # BLD: 4.49 M/UL — SIGNIFICANT CHANGE UP (ref 4.2–5.8)
RBC # FLD: 14.1 % — SIGNIFICANT CHANGE UP (ref 10.3–14.5)
SARS-COV-2 RNA SPEC QL NAA+PROBE: SIGNIFICANT CHANGE UP
SODIUM SERPL-SCNC: 137 MMOL/L — SIGNIFICANT CHANGE UP (ref 135–145)
TOTAL CHOLESTEROL/HDL RATIO MEASUREMENT: 4.2 RATIO — SIGNIFICANT CHANGE UP (ref 3.4–9.6)
TRIGL SERPL-MCNC: 102 MG/DL — SIGNIFICANT CHANGE UP (ref 10–149)
TSH SERPL-MCNC: 1.05 UU/ML — SIGNIFICANT CHANGE UP (ref 0.34–4.82)
WBC # BLD: 7.06 K/UL — SIGNIFICANT CHANGE UP (ref 3.8–10.5)
WBC # FLD AUTO: 7.06 K/UL — SIGNIFICANT CHANGE UP (ref 3.8–10.5)

## 2020-06-15 PROCEDURE — 99232 SBSQ HOSP IP/OBS MODERATE 35: CPT

## 2020-06-15 PROCEDURE — 93010 ELECTROCARDIOGRAM REPORT: CPT

## 2020-06-15 RX ORDER — SODIUM CHLORIDE 9 MG/ML
1000 INJECTION INTRAMUSCULAR; INTRAVENOUS; SUBCUTANEOUS
Refills: 0 | Status: DISCONTINUED | OUTPATIENT
Start: 2020-06-15 | End: 2020-06-16

## 2020-06-15 RX ORDER — METOPROLOL TARTRATE 50 MG
1 TABLET ORAL
Qty: 30 | Refills: 0
Start: 2020-06-15 | End: 2020-07-14

## 2020-06-15 RX ORDER — LISINOPRIL 2.5 MG/1
5 TABLET ORAL
Refills: 0 | Status: DISCONTINUED | OUTPATIENT
Start: 2020-06-15 | End: 2020-06-15

## 2020-06-15 RX ORDER — CLOPIDOGREL BISULFATE 75 MG/1
75 TABLET, FILM COATED ORAL DAILY
Refills: 0 | Status: DISCONTINUED | OUTPATIENT
Start: 2020-06-15 | End: 2020-06-16

## 2020-06-15 RX ORDER — LISINOPRIL 2.5 MG/1
1 TABLET ORAL
Qty: 30 | Refills: 0
Start: 2020-06-15 | End: 2020-07-14

## 2020-06-15 RX ORDER — CLOPIDOGREL BISULFATE 75 MG/1
1 TABLET, FILM COATED ORAL
Qty: 30 | Refills: 0
Start: 2020-06-15 | End: 2020-07-14

## 2020-06-15 RX ORDER — ROSUVASTATIN CALCIUM 5 MG/1
1 TABLET ORAL
Qty: 30 | Refills: 0
Start: 2020-06-15 | End: 2020-07-14

## 2020-06-15 RX ORDER — ROSUVASTATIN CALCIUM 5 MG/1
1 TABLET ORAL
Qty: 0 | Refills: 0 | DISCHARGE

## 2020-06-15 RX ORDER — LISINOPRIL 2.5 MG/1
1 TABLET ORAL
Qty: 0 | Refills: 0 | DISCHARGE

## 2020-06-15 RX ADMIN — ZOLPIDEM TARTRATE 5 MILLIGRAM(S): 10 TABLET ORAL at 23:29

## 2020-06-15 RX ADMIN — MONTELUKAST 10 MILLIGRAM(S): 4 TABLET, CHEWABLE ORAL at 21:49

## 2020-06-15 RX ADMIN — Medication 81 MILLIGRAM(S): at 06:31

## 2020-06-15 RX ADMIN — FAMOTIDINE 20 MILLIGRAM(S): 10 INJECTION INTRAVENOUS at 14:05

## 2020-06-15 RX ADMIN — ATORVASTATIN CALCIUM 20 MILLIGRAM(S): 80 TABLET, FILM COATED ORAL at 21:50

## 2020-06-15 RX ADMIN — CLOPIDOGREL BISULFATE 75 MILLIGRAM(S): 75 TABLET, FILM COATED ORAL at 14:05

## 2020-06-15 RX ADMIN — Medication 600 MILLIGRAM(S): at 14:05

## 2020-06-15 RX ADMIN — SODIUM CHLORIDE 75 MILLILITER(S): 9 INJECTION INTRAMUSCULAR; INTRAVENOUS; SUBCUTANEOUS at 18:16

## 2020-06-15 RX ADMIN — Medication 600 MILLIGRAM(S): at 21:49

## 2020-06-15 NOTE — PROGRESS NOTE ADULT - ASSESSMENT
70 yo male with pmh/o HTN, HLD, former smoker, who had abnormal NST outpatient and present for evaluation of worsening fog like lightheadedness, weakness, decreased exercise tolerance and subscapular pressure of 2 weeks of evolution.     #ACS  - NST outpatient  was abnormal   - Holter monitor outpatient showed " extra beats"   - EKG at admission showed NSR and some PACs  - Negative troponinsx3   - Cardiac cath performed today  6/15/20 showed calcified diagonal disease   - Pt will need a PCI of diagonal with atherectomy at [facility] once is discharge   - Start Clopidogrel 75mg PO daily   - Continue ASA and statin   - Plan is to add metoprolol upon discharge if BP improves   - Cardiology recommendations appreciated     #Orthostatic hypotension   - Multiple positive orthostatic vital signs during the day   - Start 75 ml/hr NS for 10 hrs   - Hold lisinopril  - Hold tamsulosin   - Repeat orthostatic vital signs at AM   - Fall precautions   - Monitor VS     #HTN/HLD  - Continue Atorvastatin 20mg PO daily   - Hold lisinopril   - If orthostatic improves start lisinopril 5mg PO daily   - Continue DASH diet     #BPH  - Hold Flomax due to orthostatic hypotension     #DVT ppx  - Improve score 1  - SCDs   - Encourage ambulation     Case discussed with Dr Sebastian

## 2020-06-15 NOTE — DISCHARGE NOTE PROVIDER - NSDCCPCAREPLAN_GEN_ALL_CORE_FT
PRINCIPAL DISCHARGE DIAGNOSIS  Diagnosis: Dizziness  Assessment and Plan of Treatment:       SECONDARY DISCHARGE DIAGNOSES  Diagnosis: Chest pain  Assessment and Plan of Treatment: PRINCIPAL DISCHARGE DIAGNOSIS  Diagnosis: ACS (acute coronary syndrome)  Assessment and Plan of Treatment: You came to the hospital for evaluation of dizziness, weakness and back pain. You had a Holter monitor and stress test oupatient that were abnomal and was recommended to perform a cardiac cath. Cardiac cath showed calcifications in some of the arteries of you heart. You need to proceed with an artherectomy oupatient.   - Follow with Dr Haq and  schedule an appoitment    - Follow with Dr Ceron and schedule an appoitment  - Start Copidrogrel 75 mg oral daily   - Continue with rosuvastatin 5 mg oral daily   - Continue with aspirin 81 mg oral daily   - Decrease lisinopril to 5 mg oral daily   - Return to emergency room if develop worsening dizziness, chest pain shortness of breath and any other symptoms      SECONDARY DISCHARGE DIAGNOSES  Diagnosis: Dizziness  Assessment and Plan of Treatment: You came for evaluation of dizziness. Your dizziness could be due to orthostatic hypotension which is a form of low blood pressure that happens when standing up from sitting or lying down. Yo need to decreased the lisinopril dose to 5 mg oral daily and follow with cardiology. PRINCIPAL DISCHARGE DIAGNOSIS  Diagnosis: ACS (acute coronary syndrome)  Assessment and Plan of Treatment: You came to the hospital for evaluation of dizziness, weakness and back pain. You had a Holter monitor and stress test oupatient that were abnomal and was recommended to perform a cardiac cath. Cardiac cath showed calcifications in some of the arteries of you heart. You need to proceed with an artherectomy oupatient.   - Follow with Dr Haq and  schedule an appoitment    - Follow with Dr Ceron and schedule an appoitment  - Start Copidrogrel 75 mg oral daily   -  Increase rosuvastatin to 10 mg oral daily   - Start Metoprolol 25 mg oral daily.  - Continue with aspirin 81 mg oral daily   - Decrease lisinopril to 5 mg oral daily   - Call Dr Ceron is expirience low blood presure with current regimen plan  - Return to emergency room if develop worsening dizziness, chest pain shortness of breath and any other symptoms      SECONDARY DISCHARGE DIAGNOSES  Diagnosis: Dizziness  Assessment and Plan of Treatment: You came for evaluation of dizziness. Your dizziness could be due to orthostatic hypotension which is a form of low blood pressure that happens when standing up from sitting or lying down. Yo need to decreased the lisinopril dose to 5 mg oral daily and follow with cardiology. PRINCIPAL DISCHARGE DIAGNOSIS  Diagnosis: ACS (acute coronary syndrome)  Assessment and Plan of Treatment: You came to the hospital for evaluation of dizziness, weakness and back pain. You had a Holter monitor and stress test oupatient that were abnomal and was recommended to perform a cardiac cath. Cardiac cath showed calcifications in some of the arteries of you heart. You need to proceed with an artherectomy oupatient.   - Follow with Dr Haq and  schedule an appoitment    - Follow with Dr Ceron and schedule an appoitment  - Start Copidrogrel 75 mg oral daily   -  Increase rosuvastatin to 10 mg oral daily   - Start Metoprolol 25 mg oral daily.  - Continue with aspirin 81 mg oral daily   - Decrease lisinopril to 5 mg oral daily   - Call Dr Ceron is experiencing low blood presure with current regimen plan  - Return to emergency room if develop worsening dizziness, chest pain shortness of breath and any other symptoms      SECONDARY DISCHARGE DIAGNOSES  Diagnosis: Dizziness  Assessment and Plan of Treatment: You came for evaluation of dizziness. Your dizziness could be due to orthostatic hypotension which is a form of low blood pressure that happens when standing up from sitting or lying down. Yo need to decreased the lisinopril dose to 5 mg oral daily and follow with cardiology. PRINCIPAL DISCHARGE DIAGNOSIS  Diagnosis: ACS (acute coronary syndrome)  Assessment and Plan of Treatment: You came to the hospital for evaluation of dizziness, weakness and back pain. You had a Holter monitor and stress test oupatient that were abnomal and was recommended to perform a cardiac cath. Cardiac cath showed calcifications in some of the arteries of you heart. You  will tranfer to [facility] for possible PCI and atherectomy precedure with Dr Massey.   - Follow with Dr Haq and  schedule an appoitment    - Follow with Dr Ceron and schedule an appoitment  - Start Copidrogrel 75 mg oral daily   -  Increase rosuvastatin to 10 mg oral daily   - Continue with aspirin 81 mg oral daily   - Decrease lisinopril to 5 mg oral daily   - Call Dr Ceron is experiencing low blood presure with current regimen plan  - Return to emergency room if develop worsening dizziness, chest pain shortness of breath and any other symptoms      SECONDARY DISCHARGE DIAGNOSES  Diagnosis: Dizziness  Assessment and Plan of Treatment: You came for evaluation of dizziness. Your dizziness could be due to orthostatic hypotension which is a form of low blood pressure that happens when standing up from sitting or lying down. Yo need to decreased the lisinopril dose to 5 mg oral daily and follow with cardiology. PRINCIPAL DISCHARGE DIAGNOSIS  Diagnosis: ACS (acute coronary syndrome)  Assessment and Plan of Treatment: You came to the hospital for evaluation of dizziness, weakness and back pain. You had a Holter monitor and stress test oupatient that were abnomal and was recommended to perform a cardiac cath. Cardiac cath showed calcifications in some of the arteries of you heart. You  will tranfer to [facility] for possible PCI and atherectomy precedure with Dr Massey.   - Follow with Dr Haq and  schedule an appoitment    - Follow with Dr Ceron and schedule an appoitment  - Start Copidrogrel 75 mg oral daily   -  Increase rosuvastatin to 10 mg oral daily   - Continue with aspirin 81 mg oral daily   - Decrease lisinopril to 5 mg oral daily   - Call Dr Ceron is experiencing low blood presure with current regimen plan  - Return to emergency room if develop worsening dizziness, chest pain shortness of breath and any other symptoms      SECONDARY DISCHARGE DIAGNOSES  Diagnosis: BPH (benign prostatic hyperplasia)  Assessment and Plan of Treatment: Tamsulosin was hold due to side effect of low blood pressure. Continue taking it if continue with urinary symptoms.    Diagnosis: Dizziness  Assessment and Plan of Treatment: You came for evaluation of dizziness. Your dizziness could be due to orthostatic hypotension which is a form of low blood pressure that happens when standing up from sitting or lying down. Yo need to decreased the lisinopril dose to 5 mg oral daily and follow with cardiology. PRINCIPAL DISCHARGE DIAGNOSIS  Diagnosis: ACS (acute coronary syndrome)  Assessment and Plan of Treatment: You came to the hospital for evaluation of dizziness, weakness and back pain. You had a Holter monitor and stress test oupatient that were abnomal and was recommended to perform a cardiac cath. Cardiac cath showed calcifications in some of the arteries of you heart. You  will tranfer to [facility] for possible PCI and atherectomy precedure with Dr Massey.   - Follow with Dr Haq and  schedule an appoitment    - Follow with Dr Ceron and schedule an appoitment  - Start Copidrogrel 75 mg oral daily   -  Increase rosuvastatin to 10 mg oral daily   - Continue with aspirin 81 mg oral daily   - Decrease lisinopril to 5 mg oral daily   - Call Dr Ceron is experiencing low blood presure with current regimen plan  - Metoprolol to be resumed at 25mg BID if BP meaasurements outpatient improves and allows. Please follow up with your primary care physician or Cardiologist.  - Return to emergency room if develop worsening dizziness, chest pain shortness of breath and any other symptoms      SECONDARY DISCHARGE DIAGNOSES  Diagnosis: BPH (benign prostatic hyperplasia)  Assessment and Plan of Treatment: Tamsulosin was hold due to side effect of low blood pressure. Continue taking it if continue with urinary symptoms.    Diagnosis: Dizziness  Assessment and Plan of Treatment: You came for evaluation of dizziness. Your dizziness could be due to orthostatic hypotension which is a form of low blood pressure that happens when standing up from sitting or lying down. Yo need to decreased the lisinopril dose to 5 mg oral daily and follow with cardiology.

## 2020-06-15 NOTE — CONSULT NOTE ADULT - ASSESSMENT
Chest pain- likely unstable angina.   so far Cardiac enzymes and EKG negative.   Will arrange for left heart cath.  Will continue asa, statin.    HTN- BP had been fluctuating a lot   Recently lisinopril decreased to 10mg but he was dosing based on BP that day.  CHeck orthostatic BP readings.  Outpt readings negative.    Hyperlipidemia- continue statin.    SVT- long RP tachycardia- could be atrial tachycardia.  will monitor and will start metoprolol.    Other medical issues- Management per primary team.   Thank you for allowing me to participate in the care of this patient. Please feel free to contact me with any questions.

## 2020-06-15 NOTE — DISCHARGE NOTE NURSING/CASE MANAGEMENT/SOCIAL WORK - PATIENT PORTAL LINK FT
You can access the FollowMyHealth Patient Portal offered by [facility] by registering at the following website: [email or web address]. By joining [employer]’s FollowMyHealth portal, you will also be able to view your health information using other applications (apps) compatible with our system.

## 2020-06-15 NOTE — DISCHARGE NOTE PROVIDER - CARE PROVIDER_API CALL
Malika Haq (MD)  Cardiovascular Disease; Interventional Cardiology  [address] [place] [address]  Phone: [phone]  Fax: [phone]  Follow Up Time:     Ai Luna  CARDIOVASCULAR DISEASE  [address] [place] [address]  Phone: [phone]  Fax: [phone]  Follow Up Time:

## 2020-06-15 NOTE — PROGRESS NOTE ADULT - SUBJECTIVE AND OBJECTIVE BOX
HPI:  Pt is a 70 yo male with a pmh/o nephrolithiasis, inguinal/umbilical hernia s/p repair, HTN, HLD, bronchitis with h/o COVID infection in March (not hospitalized) who tested negative in May, who has a family history of premature heart disease in father who suffered several MIs' starting in early 40's, who is an ex smoker, who presented to ED today due to 2 weeks of worsening fog like lightheadedness, weakness, decreased exercise tolerance, associated with band like subscapular pressure which occurs with exertion and at rest, is partially alleviated by tylenol, no aggrevating factors, not painful in sensation but rather pressure like, and not position dependent. Pt states he has been to ED twice for same sx and f/u with Dr. Ceron as outpatient for NST and holter monitor and was told friday that he will need an angiogram and that he was found to have extra beats on his holter monitor which were similar to his previous holter test however now 'has extra beats on the bottom and top of heart'. Pt endorses intermittent diaphoresis with episodes. Takes ASA 81mg daily. Of note, pt states this AM with onset of sx his BP was 70/40 and he did not take his BP medication until the afternoon when his BP was high with DBP of 112. Pt normo/hypertensive in Ed. Denies nausea, vomiting, palpitations, fever, cough, chills, SOB, abd pain, chest pain, HA, visual changes, paresthesias, vertigo, dysuria, diarrhea.     SUBJECTIVE:  Pt is evaluated at bedside and found NAD and in no acute distress. He was evaluated after left cath procedure and complained of a mild lightheadedness that resolved. Orthostatic vital signs performed at that moment and it was negative. During the afternoon orhtostatic repeated and was positive. We walke the patietn aorund the hallway and stumble in his feet. Dr Ceron recommended He denies chest pain, nausea, vomits and any other symptoms.         MEDICATIONS  (STANDING):  aspirin enteric coated 81 milliGRAM(s) Oral <User Schedule>  atorvastatin 20 milliGRAM(s) Oral at bedtime  clopidogrel Tablet 75 milliGRAM(s) Oral daily  famotidine    Tablet 20 milliGRAM(s) Oral <User Schedule>  gemfibrozil 600 milliGRAM(s) Oral <User Schedule>  montelukast 10 milliGRAM(s) Oral <User Schedule>  sodium chloride 0.9%. 1000 milliLiter(s) (75 mL/Hr) IV Continuous <Continuous>    MEDICATIONS  (PRN):  zolpidem 5 milliGRAM(s) Oral at bedtime PRN Insomnia  zolpidem 5 milliGRAM(s) Oral at bedtime PRN Insomnia      Vital Signs Last 24 Hrs  T(C): 36.3 (15 Solo 2020 12:01), Max: 36.8 (15 Solo 2020 06:00)  T(F): 97.4 (15 Solo 2020 12:01), Max: 98.3 (15 Solo 2020 06:00)  HR: 74 (15 Solo 2020 13:48) (61 - 77)  BP: 123/57 (15 Solo 2020 13:48) (110/68 - 145/80)  BP(mean): 98 (15 Solo 2020 11:00) (77 - 98)  RR: 14 (15 Solo 2020 13:48) (14 - 20)  SpO2: 93% (15 Solo 2020 13:48) (93% - 99%)    GEN: NAD, comfortable, resting in bed  HEENT: NC/AT, EOMI, PERRLA, MMM, clear conjunctiva and sclera, normal hearing, no nasal discharge, throat clear, no thrush, normal dentition.   NECK: supple, no JVD, no LAD, no thyromegaly  BACK:  ROM intact, no spinal/paraspinal tenderness  CV: S1S2, RRR, no mumur  RESP: good air movement, CTABL, no rales, rhonchi or wheezing, respirations unlabored  ABD: +BS, soft, ND, NT, no guarding, no rigidity, no HSM  EXT: +2 radial and pedal pulses, no edema, no calve tenderness  SKIN: No visible Rashes or Ulcers  MSK: ROM intact in all extremities  NEURO:  sensory and CN 2-12 Grossly intact, no motor deficits, no, saddle anesthesia, AOx3  PSYCH: normal behavior         LABS:                          13.4   7.06  )-----------( 213      ( 15 Solo 2020 07:10 )             41.1     15 Solo 2020 07:10    137    |  105    |  16     ----------------------------<  96     4.4     |  27     |  0.92     Ca    9.3        15 Solo 2020 07:10  Phos  3.0       15 Solo 2020 07:10  Mg     2.3       15 Solo 2020 07:10    TPro  8.3    /  Alb  4.2    /  TBili  0.7    /  DBili  x      /  AST  12     /  ALT  26     /  AlkPhos  101    14 Jun 2020 16:37    LIVER FUNCTIONS - ( 14 Jun 2020 16:37 )  Alb: 4.2 g/dL / Pro: 8.3 gm/dL / ALK PHOS: 101 U/L / ALT: 26 U/L / AST: 12 U/L / GGT: x           PT/INR - ( 15 Solo 2020 07:10 )   PT: 13.1 sec;   INR: 1.17 ratio         PTT - ( 15 Solo 2020 07:10 )  PTT:36.0 sec  CAPILLARY BLOOD GLUCOSE        CARDIAC MARKERS ( 15 Solo 2020 07:10 )  <0.015 ng/mL / x     / x     / x     / x      CARDIAC MARKERS ( 14 Jun 2020 20:39 )  <0.015 ng/mL / x     / x     / x     / x      CARDIAC MARKERS ( 14 Jun 2020 16:37 )  <0.015 ng/mL / x     / x     / x     / x              RADIOLOGY: HPI:  Pt is a 68 yo male with a pmh/o nephrolithiasis, inguinal/umbilical hernia s/p repair, HTN, HLD, bronchitis with h/o COVID infection in March (not hospitalized) who tested negative in May, who has a family history of premature heart disease in father who suffered several MIs' starting in early 40's, who is an ex smoker, who presented to ED today due to 2 weeks of worsening fog like lightheadedness, weakness, decreased exercise tolerance, associated with band like subscapular pressure which occurs with exertion and at rest, is partially alleviated by tylenol, no aggrevating factors, not painful in sensation but rather pressure like, and not position dependent. Pt states he has been to ED twice for same sx and f/u with Dr. Ceron as outpatient for NST and holter monitor and was told friday that he will need an angiogram and that he was found to have extra beats on his holter monitor which were similar to his previous holter test however now 'has extra beats on the bottom and top of heart'. Pt endorses intermittent diaphoresis with episodes. Takes ASA 81mg daily. Of note, pt states this AM with onset of sx his BP was 70/40 and he did not take his BP medication until the afternoon when his BP was high with DBP of 112. Pt normo/hypertensive in Ed. Denies nausea, vomiting, palpitations, fever, cough, chills, SOB, abd pain, chest pain, HA, visual changes, paresthesias, vertigo, dysuria, diarrhea.     SUBJECTIVE:  Pt is evaluated at bedside and found NAD and in no acute distress. He was evaluated after cardiac cath d of a mild lightheadedness that resolved. Orthostatic vital signs performed at that moment and it was negative. During the afternoon orhtostatic repeated and was positive. We walked the patietn aorund the hallway and stumble on his feet. Dr Ceron recommended to start IV fluid at 75 l/ht for 10 hrs an to repeat orthostatic at AM.  He denies chest pain, nausea, vomits and any other symptoms.         MEDICATIONS  (STANDING):  aspirin enteric coated 81 milliGRAM(s) Oral <User Schedule>  atorvastatin 20 milliGRAM(s) Oral at bedtime  clopidogrel Tablet 75 milliGRAM(s) Oral daily  famotidine    Tablet 20 milliGRAM(s) Oral <User Schedule>  gemfibrozil 600 milliGRAM(s) Oral <User Schedule>  montelukast 10 milliGRAM(s) Oral <User Schedule>  sodium chloride 0.9%. 1000 milliLiter(s) (75 mL/Hr) IV Continuous <Continuous>    MEDICATIONS  (PRN):  zolpidem 5 milliGRAM(s) Oral at bedtime PRN Insomnia  zolpidem 5 milliGRAM(s) Oral at bedtime PRN Insomnia      Vital Signs Last 24 Hrs  T(C): 36.3 (15 Solo 2020 12:01), Max: 36.8 (15 Solo 2020 06:00)  T(F): 97.4 (15 Solo 2020 12:01), Max: 98.3 (15 Solo 2020 06:00)  HR: 74 (15 Solo 2020 13:48) (61 - 77)  BP: 123/57 (15 Solo 2020 13:48) (110/68 - 145/80)  BP(mean): 98 (15 Solo 2020 11:00) (77 - 98)  RR: 14 (15 Solo 2020 13:48) (14 - 20)  SpO2: 93% (15 Solo 2020 13:48) (93% - 99%)    GEN: NAD, comfortable, resting in bed  HEENT: NC/AT, EOMI, PERRLA, MMM, clear conjunctiva and sclera, normal hearing, no nasal discharge, throat clear, no thrush, normal dentition.   NECK: supple, no JVD, no LAD, no thyromegaly  BACK:  ROM intact, no spinal/paraspinal tenderness  CV: S1S2, RRR, no mumur  RESP: good air movement, CTABL, no rales, rhonchi or wheezing, respirations unlabored  ABD: +BS, soft, ND, NT, no guarding, no rigidity, no HSM  EXT: +2 radial and pedal pulses, no edema, no calve tenderness  SKIN: No visible Rashes or Ulcers  MSK: ROM intact in all extremities  NEURO:  sensory and CN 2-12 Grossly intact, no motor deficits, no, saddle anesthesia, AOx3  PSYCH: normal behavior         LABS:                          13.4   7.06  )-----------( 213      ( 15 Solo 2020 07:10 )             41.1     15 Solo 2020 07:10    137    |  105    |  16     ----------------------------<  96     4.4     |  27     |  0.92     Ca    9.3        15 Solo 2020 07:10  Phos  3.0       15 Solo 2020 07:10  Mg     2.3       15 Solo 2020 07:10    TPro  8.3    /  Alb  4.2    /  TBili  0.7    /  DBili  x      /  AST  12     /  ALT  26     /  AlkPhos  101    14 Jun 2020 16:37    LIVER FUNCTIONS - ( 14 Jun 2020 16:37 )  Alb: 4.2 g/dL / Pro: 8.3 gm/dL / ALK PHOS: 101 U/L / ALT: 26 U/L / AST: 12 U/L / GGT: x           PT/INR - ( 15 Solo 2020 07:10 )   PT: 13.1 sec;   INR: 1.17 ratio         PTT - ( 15 Solo 2020 07:10 )  PTT:36.0 sec  CAPILLARY BLOOD GLUCOSE        CARDIAC MARKERS ( 15 Solo 2020 07:10 )  <0.015 ng/mL / x     / x     / x     / x      CARDIAC MARKERS ( 14 Jun 2020 20:39 )  <0.015 ng/mL / x     / x     / x     / x      CARDIAC MARKERS ( 14 Jun 2020 16:37 )  <0.015 ng/mL / x     / x     / x     / x              RADIOLOGY: HPI:  Pt is a 70 yo male with a pmh/o nephrolithiasis, inguinal/umbilical hernia s/p repair, HTN, HLD, bronchitis with h/o COVID infection in March (not hospitalized) who tested negative in May, who has a family history of premature heart disease in father who suffered several MIs' starting in early 40's, who is an ex smoker, who presented to ED today due to 2 weeks of worsening fog like lightheadedness, weakness, decreased exercise tolerance, associated with band like subscapular pressure which occurs with exertion and at rest, is partially alleviated by tylenol, no aggrevating factors, not painful in sensation but rather pressure like, and not position dependent. Pt states he has been to ED twice for same sx and f/u with Dr. Ceron as outpatient for NST and holter monitor and was told friday that he will need an angiogram and that he was found to have extra beats on his holter monitor which were similar to his previous holter test however now 'has extra beats on the bottom and top of heart'. Pt endorses intermittent diaphoresis with episodes. Takes ASA 81mg daily. Of note, pt states this AM with onset of sx his BP was 70/40 and he did not take his BP medication until the afternoon when his BP was high with DBP of 112. Pt normo/hypertensive in Ed. Denies nausea, vomiting, palpitations, fever, cough, chills, SOB, abd pain, chest pain, HA, visual changes, paresthesias, vertigo, dysuria, diarrhea.     SUBJECTIVE:  Pt is evaluated at bedside and found NAD and in no acute distress. He was evaluated after cardiac cath and complained of a mild lightheadedness that resolved. Orthostatic vital signs performed at that moment and it was negative. During the afternoon orhtostatic repeated and was positive. We walked the patietn aorund the hallway and stumble on his feet. Dr Ceron recommended to start IV fluid at 75 l/hr for 10 hrs an to repeat orthostatic at AM.  Tamsulosin  and lisinopril will be put on hold until improvement of orthostatic hypotension. He denies chest pain, nausea, vomits and any other symptoms. No other events overnight.         MEDICATIONS  (STANDING):  aspirin enteric coated 81 milliGRAM(s) Oral <User Schedule>  atorvastatin 20 milliGRAM(s) Oral at bedtime  clopidogrel Tablet 75 milliGRAM(s) Oral daily  famotidine    Tablet 20 milliGRAM(s) Oral <User Schedule>  gemfibrozil 600 milliGRAM(s) Oral <User Schedule>  montelukast 10 milliGRAM(s) Oral <User Schedule>  sodium chloride 0.9%. 1000 milliLiter(s) (75 mL/Hr) IV Continuous <Continuous>    MEDICATIONS  (PRN):  zolpidem 5 milliGRAM(s) Oral at bedtime PRN Insomnia  zolpidem 5 milliGRAM(s) Oral at bedtime PRN Insomnia      Vital Signs Last 24 Hrs  T(C): 36.3 (15 Solo 2020 12:01), Max: 36.8 (15 Solo 2020 06:00)  T(F): 97.4 (15 Solo 2020 12:01), Max: 98.3 (15 Solo 2020 06:00)  HR: 74 (15 Solo 2020 13:48) (61 - 77)  BP: 123/57 (15 Solo 2020 13:48) (110/68 - 145/80)  BP(mean): 98 (15 Solo 2020 11:00) (77 - 98)  RR: 14 (15 Solo 2020 13:48) (14 - 20)  SpO2: 93% (15 Solo 2020 13:48) (93% - 99%)    GEN: NAD, comfortable, resting in bed  HEENT: NC/AT, EOMI, PERRLA, MMM, clear conjunctiva and sclera, normal hearing, no nasal discharge, throat clear, no thrush, normal dentition.   NECK: supple, no JVD, no LAD, no thyromegaly  BACK:  ROM intact, no spinal/paraspinal tenderness  CV: S1S2, RRR, no mumur  RESP: good air movement, CTABL, no rales, rhonchi or wheezing, respirations unlabored  ABD: +BS, soft, ND, NT, no guarding, no rigidity, no HSM  EXT: +2 radial and pedal pulses, no edema, no calve tenderness  SKIN: No visible Rashes or Ulcers  MSK: ROM intact in all extremities  NEURO:  sensory and CN 2-12 Grossly intact, no motor deficits, no, saddle anesthesia, AOx3  PSYCH: normal behavior         LABS:                          13.4   7.06  )-----------( 213      ( 15 Solo 2020 07:10 )             41.1     15 Solo 2020 07:10    137    |  105    |  16     ----------------------------<  96     4.4     |  27     |  0.92     Ca    9.3        15 Solo 2020 07:10  Phos  3.0       15 Solo 2020 07:10  Mg     2.3       15 Solo 2020 07:10    TPro  8.3    /  Alb  4.2    /  TBili  0.7    /  DBili  x      /  AST  12     /  ALT  26     /  AlkPhos  101    14 Jun 2020 16:37    LIVER FUNCTIONS - ( 14 Jun 2020 16:37 )  Alb: 4.2 g/dL / Pro: 8.3 gm/dL / ALK PHOS: 101 U/L / ALT: 26 U/L / AST: 12 U/L / GGT: x           PT/INR - ( 15 Solo 2020 07:10 )   PT: 13.1 sec;   INR: 1.17 ratio         PTT - ( 15 Solo 2020 07:10 )  PTT:36.0 sec  CAPILLARY BLOOD GLUCOSE        CARDIAC MARKERS ( 15 Solo 2020 07:10 )  <0.015 ng/mL / x     / x     / x     / x      CARDIAC MARKERS ( 14 Jun 2020 20:39 )  <0.015 ng/mL / x     / x     / x     / x      CARDIAC MARKERS ( 14 Jun 2020 16:37 )  <0.015 ng/mL / x     / x     / x     / x              RADIOLOGY: HPI:  Pt is a 70 yo male with a pmh/o nephrolithiasis, inguinal/umbilical hernia s/p repair, HTN, HLD, bronchitis with h/o COVID infection in March (not hospitalized) who tested negative in May, who has a family history of premature heart disease in father who suffered several MIs' starting in early 40's, who is an ex smoker, who presented to ED today due to 2 weeks of worsening fog like lightheadedness, weakness, decreased exercise tolerance, associated with band like subscapular pressure which occurs with exertion and at rest, is partially alleviated by tylenol, no aggrevating factors, not painful in sensation but rather pressure like, and not position dependent. Pt states he has been to ED twice for same sx and f/u with Dr. Ceron as outpatient for NST and holter monitor and was told friday that he will need an angiogram and that he was found to have extra beats on his holter monitor which were similar to his previous holter test however now 'has extra beats on the bottom and top of heart'. Pt endorses intermittent diaphoresis with episodes. Takes ASA 81mg daily. Of note, pt states this AM with onset of sx his BP was 70/40 and he did not take his BP medication until the afternoon when his BP was high with DBP of 112. Pt normo/hypertensive in Ed. Denies nausea, vomiting, palpitations, fever, cough, chills, SOB, abd pain, chest pain, HA, visual changes, paresthesias, vertigo, dysuria, diarrhea.     SUBJECTIVE:  Pt is evaluated at bedside and found NAD and in no acute distress. He was evaluated after cardiac cath and complained of a mild lightheadedness that resolved. Orthostatic vital signs performed at that moment and it was negative. During the afternoon orhtostatic repeated and was positive. We walked the patietn aorund the hallway and stumble on his feet. Dr Ceron recommended to start IV fluid at 75 l/hr for 10 hrs an to repeat orthostatic at AM.  Tamsulosin and lisinopril will be put on hold until improvement of orthostatic hypotension. He denies chest pain, nausea, vomits and any other symptoms. No other events overnight.         MEDICATIONS  (STANDING):  aspirin enteric coated 81 milliGRAM(s) Oral <User Schedule>  atorvastatin 20 milliGRAM(s) Oral at bedtime  clopidogrel Tablet 75 milliGRAM(s) Oral daily  famotidine    Tablet 20 milliGRAM(s) Oral <User Schedule>  gemfibrozil 600 milliGRAM(s) Oral <User Schedule>  montelukast 10 milliGRAM(s) Oral <User Schedule>  sodium chloride 0.9%. 1000 milliLiter(s) (75 mL/Hr) IV Continuous <Continuous>    MEDICATIONS  (PRN):  zolpidem 5 milliGRAM(s) Oral at bedtime PRN Insomnia  zolpidem 5 milliGRAM(s) Oral at bedtime PRN Insomnia      Vital Signs Last 24 Hrs  T(C): 36.3 (15 Solo 2020 12:01), Max: 36.8 (15 Solo 2020 06:00)  T(F): 97.4 (15 Solo 2020 12:01), Max: 98.3 (15 Solo 2020 06:00)  HR: 74 (15 Solo 2020 13:48) (61 - 77)  BP: 123/57 (15 Solo 2020 13:48) (110/68 - 145/80)  BP(mean): 98 (15 Solo 2020 11:00) (77 - 98)  RR: 14 (15 Solo 2020 13:48) (14 - 20)  SpO2: 93% (15 Solo 2020 13:48) (93% - 99%)    GEN: NAD, comfortable, resting in bed  HEENT: NC/AT, EOMI, PERRLA, MMM, clear conjunctiva and sclera, normal hearing, no nasal discharge, throat clear, no thrush, normal dentition.   NECK: supple, no JVD, no LAD, no thyromegaly  CV: S1S2, RRR, no mumur  RESP: good air movement, CTABL, no rales, rhonchi or wheezing, respirations unlabored  ABD: +BS, soft, ND, NT, no guarding, no rigidity, no HSM  EXT: no edema, no calve tenderness  SKIN: fungal infection on both foot more prominent in the right foot   MSK: ROM intact in all extremities  NEURO:  sensory and CN 2-12 Grossly intact, no motor deficits, no, saddle anesthesia, AOx3  PSYCH: normal behavior         LABS:                          13.4   7.06  )-----------( 213      ( 15 Solo 2020 07:10 )             41.1     15 Solo 2020 07:10    137    |  105    |  16     ----------------------------<  96     4.4     |  27     |  0.92     Ca    9.3        15 Solo 2020 07:10  Phos  3.0       15 Solo 2020 07:10  Mg     2.3       15 Solo 2020 07:10    TPro  8.3    /  Alb  4.2    /  TBili  0.7    /  DBili  x      /  AST  12     /  ALT  26     /  AlkPhos  101    14 Jun 2020 16:37    LIVER FUNCTIONS - ( 14 Jun 2020 16:37 )  Alb: 4.2 g/dL / Pro: 8.3 gm/dL / ALK PHOS: 101 U/L / ALT: 26 U/L / AST: 12 U/L / GGT: x           PT/INR - ( 15 Solo 2020 07:10 )   PT: 13.1 sec;   INR: 1.17 ratio         PTT - ( 15 Solo 2020 07:10 )  PTT:36.0 sec  CAPILLARY BLOOD GLUCOSE        CARDIAC MARKERS ( 15 Solo 2020 07:10 )  <0.015 ng/mL / x     / x     / x     / x      CARDIAC MARKERS ( 14 Jun 2020 20:39 )  <0.015 ng/mL / x     / x     / x     / x      CARDIAC MARKERS ( 14 Jun 2020 16:37 )  <0.015 ng/mL / x     / x     / x     / x              RADIOLOGY:   Xray Chest 1 View-PORTABLE IMMEDIATE (06.14.20 @ 17:31) >  FINDINGS:    Lungs: The lungs are clear.  Heart: The heart is normal in size.  Mediastinum: The mediastinum is within normal limits.    IMPRESSION:    Clear lungs. HPI:  Pt is a 70 yo male with a pmh/o nephrolithiasis, inguinal/umbilical hernia s/p repair, HTN, HLD, bronchitis with h/o COVID infection in March (not hospitalized) who tested negative in May, who has a family history of premature heart disease in father who suffered several MIs' starting in early 40's, who is an ex smoker, who presented to ED today due to 2 weeks of worsening fog like lightheadedness, weakness, decreased exercise tolerance, associated with band like subscapular pressure which occurs with exertion and at rest, is partially alleviated by tylenol, no aggrevating factors, not painful in sensation but rather pressure like, and not position dependent. Pt states he has been to ED twice for same sx and f/u with Dr. Ceron as outpatient for NST and holter monitor and was told friday that he will need an angiogram and that he was found to have extra beats on his holter monitor which were similar to his previous holter test however now 'has extra beats on the bottom and top of heart'. Pt endorses intermittent diaphoresis with episodes. Takes ASA 81mg daily. Of note, pt states this AM with onset of sx his BP was 70/40 and he did not take his BP medication until the afternoon when his BP was high with DBP of 112. Pt normo/hypertensive in Ed. Denies nausea, vomiting, palpitations, fever, cough, chills, SOB, abd pain, chest pain, HA, visual changes, paresthesias, vertigo, dysuria, diarrhea.     SUBJECTIVE:  Pt is evaluated at bedside and found NAD and in no acute distress. He was evaluated after cardiac cath and complained of a mild lightheadedness that resolved. Orthostatic vital signs performed at that moment and it was negative. During the afternoon orhtostatic repeated and was positive. We walk the patient and he exhibit an unstable gait. Dr Ceron recommended to start IV fluid at 75 l/hr for 10 hrs an to repeat orthostatic at AM.  Tamsulosin and lisinopril will be put on hold until improvement of orthostatic hypotension. He denies chest pain, nausea, vomits and any other symptoms. No other events overnight.       MEDICATIONS  (STANDING):  aspirin enteric coated 81 milliGRAM(s) Oral <User Schedule>  atorvastatin 20 milliGRAM(s) Oral at bedtime  clopidogrel Tablet 75 milliGRAM(s) Oral daily  famotidine    Tablet 20 milliGRAM(s) Oral <User Schedule>  gemfibrozil 600 milliGRAM(s) Oral <User Schedule>  montelukast 10 milliGRAM(s) Oral <User Schedule>  sodium chloride 0.9%. 1000 milliLiter(s) (75 mL/Hr) IV Continuous <Continuous>    MEDICATIONS  (PRN):  zolpidem 5 milliGRAM(s) Oral at bedtime PRN Insomnia  zolpidem 5 milliGRAM(s) Oral at bedtime PRN Insomnia      Vital Signs Last 24 Hrs  T(C): 36.3 (15 Solo 2020 12:01), Max: 36.8 (15 Solo 2020 06:00)  T(F): 97.4 (15 Solo 2020 12:01), Max: 98.3 (15 Solo 2020 06:00)  HR: 74 (15 Solo 2020 13:48) (61 - 77)  BP: 123/57 (15 Sloo 2020 13:48) (110/68 - 145/80)  BP(mean): 98 (15 Solo 2020 11:00) (77 - 98)  RR: 14 (15 Solo 2020 13:48) (14 - 20)  SpO2: 93% (15 Solo 2020 13:48) (93% - 99%)    GEN: NAD, comfortable, resting in bed  HEENT: NC/AT, EOMI, PERRLA, MMM, clear conjunctiva and sclera, normal hearing, no nasal discharge, throat clear, no thrush, normal dentition.   NECK: supple, no JVD, no LAD, no thyromegaly  CV: S1S2, RRR, no murmur  RESP: good air movement, CTABL, no rales, rhonchi or wheezing, respirations unlabored  ABD: +BS, soft, ND, NT, no guarding, no rigidity, no HSM  EXT: no edema, no calve tenderness  SKIN: fungal infection on both feet more prominent in the right one   MSK: ROM intact in all extremities  NEURO:  sensory and CN 2-12 Grossly intact, no motor deficits, no, saddle anesthesia, AOx3  PSYCH: normal behavior         LABS:                          13.4   7.06  )-----------( 213      ( 15 Solo 2020 07:10 )             41.1     15 Solo 2020 07:10    137    |  105    |  16     ----------------------------<  96     4.4     |  27     |  0.92     Ca    9.3        15 Solo 2020 07:10  Phos  3.0       15 Solo 2020 07:10  Mg     2.3       15 Solo 2020 07:10    TPro  8.3    /  Alb  4.2    /  TBili  0.7    /  DBili  x      /  AST  12     /  ALT  26     /  AlkPhos  101    14 Jun 2020 16:37    LIVER FUNCTIONS - ( 14 Jun 2020 16:37 )  Alb: 4.2 g/dL / Pro: 8.3 gm/dL / ALK PHOS: 101 U/L / ALT: 26 U/L / AST: 12 U/L / GGT: x           PT/INR - ( 15 Solo 2020 07:10 )   PT: 13.1 sec;   INR: 1.17 ratio         PTT - ( 15 Solo 2020 07:10 )  PTT:36.0 sec  CAPILLARY BLOOD GLUCOSE        CARDIAC MARKERS ( 15 Solo 2020 07:10 )  <0.015 ng/mL / x     / x     / x     / x      CARDIAC MARKERS ( 14 Jun 2020 20:39 )  <0.015 ng/mL / x     / x     / x     / x      CARDIAC MARKERS ( 14 Jun 2020 16:37 )  <0.015 ng/mL / x     / x     / x     / x              RADIOLOGY:   Xray Chest 1 View-PORTABLE IMMEDIATE (06.14.20 @ 17:31) >  FINDINGS:    Lungs: The lungs are clear.  Heart: The heart is normal in size.  Mediastinum: The mediastinum is within normal limits.    IMPRESSION:    Clear lungs.

## 2020-06-15 NOTE — PACU DISCHARGE NOTE - COMMENTS
Report given to Inocencio, elie RN on [facility].   Pt. replaced on portable cardiac telemetry monitor and reading confirmed w/ yusef Ricks RN on [facility].   Pt. transferred to inpt. room on [address] [facility] on cardiac monitor via stretcher accompanied by transport tech.

## 2020-06-15 NOTE — DISCHARGE NOTE PROVIDER - HOSPITAL COURSE
68 yo male with a PMHx of nephrolithiasis, inguinal/umbilical hernia s/p repair, HTN, HLD, bronchitis with h/o COVID infection in March (not hospitalized) who tested negative in May, came to ED on 6/14/20 for evaluation of two weeks of worsening fog like lightheadedness, weakness, decreased exercise tolerance and band like subscapular pressure which occurs with exertion and at rest. He saw Dr. Ceron as outpatient for NST and Holter monitor which was abnormal. He also reported episodes of hypotension and lisinopril dose was decreased from 20mg to 10 mg PO daily by cardiology. At arrival to ED an EKG was performed and it showed normal sinus rhythm with some PVCs, PVAs and negative tropininsx3. Cardiology was consulted and recommended to proceed with left heart cath. 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 During hospital course  patient was noticed with positive orthostatics vital signs and lisinopril dose was decreased to 5 mg PO daily. Repeated orthostatics vital signs before discharge were negative. Patient is hemodynamically stable, asymptomatic and will be discharge home today 6/15/20.         SUBJECTIVE:    Pt is evaluated at bedside and found NAD and in no acute distress. He was evaluated after left cath procedure and complained of a mild lightheadedness that resolved. Orthostatic vital signs performed at that moment and it was negative. He denies chest pain, nausea, vomits and any other symptoms.         Vitals    T(F): 97.4 (06-15-20 @ 12:01), Max: 98.3 (06-15-20 @ 06:00)    HR: 74 (06-15-20 @ 13:48) (61 - 84)    BP: 123/57 (06-15-20 @ 13:48) (110/68 - 152/78)    RR: 14 (06-15-20 @ 13:48) (14 - 20)    SpO2: 93% (06-15-20 @ 13:48) (93% - 99%)        Physical Exam     Gen: NAD, comfortable    CV: RRR, nl s1/s2, no M/R/G    Pulm: nl respiratory effort, CTAB, no wheezes/crackles/rhonchi    Abd: normoactive bowel sounds in all 4 quadrants, soft, nontender, nondistended, no rebound, no guarding, no masses    Extremities: no pedal edema, decrese pedal pulses    Neuro: A&Ox3, answering questions appropriately, PERRL, EOMI, face symmetric, sensation equal bilaterally in face, tongue midline, no dysarthria, 5/5 strength in upper and lower extremities bilaterally, sensation intact in upper and lower extremities bilaterally, nl finger to nose, and nl heel to shin         RADIOLOGIC:      Xray Chest 1 View-PORTABLE IMMEDIATE (06.14.20 @ 17:31) >        FINDINGS:        Lungs: The lungs are clear.    Heart: The heart is normal in size.    Mediastinum: The mediastinum is within normal limits.        IMPRESSION:        Clear lungs. 70 yo male with a PMHx of nephrolithiasis, inguinal/umbilical hernia s/p repair, HTN, HLD, bronchitis with h/o COVID infection in March (not hospitalized) who tested negative in May, came to ED on 6/14/20 for evaluation of two weeks of worsening fog like lightheadedness, weakness, decreased exercise tolerance and band like subscapular pressure which occurs with exertion and at rest. He saw Dr. Ceron as outpatient for NST and Holter monitor which was abnormal. He also reported episodes of hypotension and lisinopril dose was decreased from 20mg to 10 mg PO daily by cardiology. At arrival to ED an EKG was performed and it showed normal sinus rhythm with some PACs and negative tropininsx3. Cardiology was consulted and recommended to proceed with left heart cath. Left heart cath was performed on 6/15/20 and showed calcificate diagonal disease which will need PCI  with atherectomy that will be schedule outpatient at [facility]. During hospital course  patient was noticed with positive orthostatics vital signs and lisinopril dose was decreased to 5 mg PO daily. Repeated orthostatics vital signs before discharge were negative. Patient is hemodynamically stable, asymptomatic and will be discharge home today 6/15/20.         SUBJECTIVE:    Pt is evaluated at bedside and found NAD and in no acute distress. He was evaluated after left cath procedure and complained of a mild lightheadedness that resolved. Orthostatic vital signs performed at that moment and it was negative. He denies chest pain, nausea, vomits and any other symptoms.         Vitals    T(F): 97.4 (06-15-20 @ 12:01), Max: 98.3 (06-15-20 @ 06:00)    HR: 74 (06-15-20 @ 13:48) (61 - 84)    BP: 123/57 (06-15-20 @ 13:48) (110/68 - 152/78)    RR: 14 (06-15-20 @ 13:48) (14 - 20)    SpO2: 93% (06-15-20 @ 13:48) (93% - 99%)        Physical Exam     Gen: NAD, comfortable    CV: RRR, nl s1/s2, no M/R/G    Pulm: nl respiratory effort, CTAB, no wheezes/crackles/rhonchi    Abd: normoactive bowel sounds in all 4 quadrants, soft, nontender, nondistended, no rebound, no guarding, no masses    Extremities: no pedal edema, decrese pedal pulses    Neuro: A&Ox3, answering questions appropriately, PERRL, EOMI, face symmetric,5/5 strength in upper and lower extremities bilaterally, sensation intact in upper and lower extremities bilaterally        RADIOLOGIC:      Xray Chest 1 View-PORTABLE IMMEDIATE (06.14.20 @ 17:31) >        FINDINGS:        Lungs: The lungs are clear.    Heart: The heart is normal in size.    Mediastinum: The mediastinum is within normal limits.        IMPRESSION:        Clear lungs. 68 yo male with a PMHx of nephrolithiasis, inguinal/umbilical hernia s/p repair, HTN, HLD, bronchitis with h/o COVID infection in March (not hospitalized) who tested negative in May, came to ED on 6/14/20 for evaluation of two weeks of worsening fog like lightheadedness, weakness, decreased exercise tolerance and band like subscapular pressure which occurs with exertion and at rest. He saw Dr. Ceron as outpatient for NST and Holter monitor which was abnormal. He also reported episodes of hypotension and lisinopril dose was decreased from 20mg to 10 mg PO daily by cardiology. At arrival to ED an EKG was performed and it showed normal sinus rhythm with some PACs and negative tropininsx3. Cardiology was consulted and recommended to proceed with left heart cath. Left heart cath was performed on 6/15/20 and showed calcificate diagonal disease which will need PCI  with atherectomy that will be schedule outpatient at [facility]. During hospital course  patient was noticed with positive orthostatics vital signs and lisinopril dose was decreased to 5 mg PO daily. Repeated orthostatics vital signs before discharge were negative. Patient is hemodynamically stable, asymptomatic and will be discharge home today 6/15/20.         I contacted Dr Ceron and recommended to start Metroprolol 25 mg PO BID and to increase dose of rosuvastatin to 10 mg PO daily. Pt advised to contact Dr Ceron  for make a follow appointment and to notify if is experiencing low blood pressure with current medication regimen.        SUBJECTIVE:    Pt is evaluated at bedside and found NAD and in no acute distress. He was evaluated after left cath procedure and complained of a mild lightheadedness that resolved. Orthostatic vital signs performed at that moment and it was negative. He denies chest pain, nausea, vomits and any other symptoms.         Vitals    T(F): 97.4 (06-15-20 @ 12:01), Max: 98.3 (06-15-20 @ 06:00)    HR: 74 (06-15-20 @ 13:48) (61 - 84)    BP: 123/57 (06-15-20 @ 13:48) (110/68 - 152/78)    RR: 14 (06-15-20 @ 13:48) (14 - 20)    SpO2: 93% (06-15-20 @ 13:48) (93% - 99%)        Physical Exam     Gen: NAD, comfortable    CV: RRR, nl s1/s2, no M/R/G    Pulm: nl respiratory effort, CTAB, no wheezes/crackles/rhonchi    Abd: normoactive bowel sounds in all 4 quadrants, soft, nontender, nondistended, no rebound, no guarding, no masses    Extremities: no pedal edema, decrese pedal pulses    Neuro: A&Ox3, answering questions appropriately, PERRL, EOMI, face symmetric,5/5 strength in upper and lower extremities bilaterally, sensation intact in upper and lower extremities bilaterally        RADIOLOGIC:      Xray Chest 1 View-PORTABLE IMMEDIATE (06.14.20 @ 17:31) >        FINDINGS:        Lungs: The lungs are clear.    Heart: The heart is normal in size.    Mediastinum: The mediastinum is within normal limits.        IMPRESSION:        Clear lungs. 70 yo male with a PMHx of nephrolithiasis, inguinal/umbilical hernia s/p repair, HTN, HLD, bronchitis with h/o COVID infection in March (not hospitalized) who tested negative in May, came to ED on 6/14/20 for evaluation of two weeks of worsening fog like lightheadedness, weakness, decreased exercise tolerance and band like subscapular pressure which occurs with exertion and at rest. He saw Dr. Ceron as outpatient for NST and Holter monitor which was abnormal. He also reported episodes of hypotension and lisinopril dose was decreased from 20mg to 10 mg PO daily by cardiology. At arrival to ED an EKG was performed and it showed normal sinus rhythm with some PACs and negative tropininsx3. Cardiology was consulted and recommended to proceed with left heart cath. Left heart cath was performed on 6/15/20 and showed calcificate diagonal disease which will need PCI  with atherectomy that will be schedule outpatient at [facility]. During hospital course  patient was noticed with positive orthostatics vital signs and lisinopril dose was decreased to 5 mg PO daily. Repeated orthostatics vital signs before discharge were negative. Patient is hemodynamically stable, asymptomatic and will be discharge home today 6/15/20.         I contacted Dr Ceron and recommended to start Metroprolol 25 mg PO BID and to increase dose of rosuvastatin to 10 mg PO daily. Pt advised to contact Dr Ceron  for make a follow appointment and to notify if is experiencing low blood pressure with current medication regimen.        SUBJECTIVE:    Pt is evaluated at bedside and found NAD and in no acute distress. He was evaluated after left cath procedure and complained of a mild lightheadedness that resolved. Orthostatic vital signs performed at that moment and it was negative. He denies chest pain, nausea, vomits and any other symptoms.         Vitals    T(F): 97.4 (06-15-20 @ 12:01), Max: 98.3 (06-15-20 @ 06:00)    HR: 74 (06-15-20 @ 13:48) (61 - 84)    BP: 123/57 (06-15-20 @ 13:48) (110/68 - 152/78)    RR: 14 (06-15-20 @ 13:48) (14 - 20)    SpO2: 93% (06-15-20 @ 13:48) (93% - 99%)        Physical Exam     Gen: NAD, comfortable    CV: RRR, nl s1/s2, no M/R/G    Pulm: nl respiratory effort, CTAB, no wheezes/crackles/rhonchi    Abd: normoactive bowel sounds in all 4 quadrants, soft, nontender, nondistended, no rebound, no guarding, no masses    Extremities: no pedal edema, decrese pedal pulses    Neuro: A&Ox3, answering questions appropriately, PERRL, EOMI, face symmetric,5/5 strength in upper and lower extremities bilaterally, sensation intact in upper and lower extremities bilaterally        RADIOLOGIC:      Xray Chest 1 View-PORTABLE IMMEDIATE (06.14.20 @ 17:31) >        FINDINGS:        Lungs: The lungs are clear.    Heart: The heart is normal in size.    Mediastinum: The mediastinum is within normal limits.        IMPRESSION:        Clear lungs.            medicine attending addendum- [facility] freddy man had atypical anginal sx's and a positive outpatient NST. thus he underwent LHC that revealed a calcified lesion that will necessitate intervention at [facility]. he has been pain free while here. he also was having sx's of light headedness that seemed separate     and apart from the CAD sx's. thius coincided with him losing over 20 pounds but his BP meds had not been changed. he had orthstatic changes and sx's here c/w the impact of his vasoactive medications causing his sx's. we are DC'ing him on a reduced dose of his ACE-I. BB's were added so instructions re sx's were reviewed and cards will titrate down further as needed. total time spent ~35 min 68 yo male with a PMHx of nephrolithiasis, inguinal/umbilical hernia s/p repair, HTN, HLD, bronchitis with h/o COVID infection in March (not hospitalized) who tested negative in May, came to ED on 6/14/20 for evaluation of two weeks of worsening fog like lightheadedness, weakness, decreased exercise tolerance and band like subscapular pressure which occurs with exertion and at rest. He saw Dr. Ceron as outpatient for NST and Holter monitor which was abnormal. He also reported episodes of hypotension and lisinopril dose was decreased from 20mg to 10 mg PO daily by cardiology. At arrival to ED an EKG was performed and it showed normal sinus rhythm with some PACs and negative tropininsx3. Cardiology was consulted and recommended to proceed with left heart cath. Left heart cath was performed on 6/15/20 and showed calcificate diagonal disease which will need PCI  with atherectomy that will be schedule outpatient at [facility]. During hospital course  patient was noticed with positive orthostatics vital signs and lisinopril dose was decreased to 5 mg PO daily and received IV fluid. Repeated orthostatics vital signs before discharge were negative. Patient is hemodynamically stable, asymptomatic and will be transfer to [facility] to continue treatment and possible PCI with atherectomy with Dr Stevenson.         I contacted Dr Ceron and recommended to start Metroprolol 25 mg PO BID once BP tolerate it and to increase dose of rosuvastatin to 10 mg PO daily. Pt advised to contact Dr Ceron  for make a follow appointment and to notify if is experiencing low blood pressure with current medication regimen.        Vital Signs Last 24 Hrs    T(C): 36.8 (16 Jun 2020 08:46), Max: 36.8 (15 Solo 2020 20:35)    T(F): 98.2 (16 Jun 2020 08:46), Max: 98.3 (15 Solo 2020 20:35)    HR: 70 (15 Solo 2020 20:35) (62 - 74)    BP: 130/55 (15 Solo 2020 20:35) (123/57 - 132/74)    BP(mean): --    RR: 95 (16 Jun 2020 08:46) (14 - 99)    SpO2: 93% (15 Solo 2020 13:48) (93% - 97%)        Physical Exam     Gen: NAD, comfortable    CV: RRR, nl s1/s2, no M/R/G    Pulm: nl respiratory effort, CTAB, no wheezes/crackles/rhonchi    Abd: normoactive bowel sounds in all 4 quadrants, soft, nontender, nondistended, no rebound, no guarding, no masses    Extremities: no pedal edema, decrese pedal pulses    Neuro: A&Ox3, answering questions appropriately, PERRL, EOMI, face symmetric,5/5 strength in upper and lower extremities bilaterally, sensation intact in upper and lower extremities bilaterally        RADIOLOGIC:      Xray Chest 1 View-PORTABLE IMMEDIATE (06.14.20 @ 17:31) >        FINDINGS:        Lungs: The lungs are clear.    Heart: The heart is normal in size.    Mediastinum: The mediastinum is within normal limits.        IMPRESSION:        Clear lungs.            medicine attending addendum- pati freddy man had atypical anginal sx's and a positive outpatient NST. thus he underwent LHC that revealed a calcified lesion that will necessitate intervention at [facility]. he has been pain free while here. he also was having sx's of light headedness that seemed separate     and apart from the CAD sx's. thius coincided with him losing over 20 pounds but his BP meds had not been changed. he had orthstatic changes and sx's here c/w the impact of his vasoactive medications causing his sx's. we are DC'ing him on a reduced dose of his ACE-I. BB's were added so instructions re sx's were reviewed and cards will titrate down further as needed. total time spent ~35 min 70 yo male with a PMHx of nephrolithiasis, inguinal/umbilical hernia s/p repair, HTN, HLD, bronchitis with h/o COVID infection in March (not hospitalized) who tested negative in May, came to ED on 6/14/20 for evaluation of two weeks of worsening fog like lightheadedness, weakness, decreased exercise tolerance and band like subscapular pressure which occurs with exertion and at rest. He saw Dr. Ceron as outpatient for NST and Holter monitor which was abnormal. He also reported episodes of hypotension and lisinopril dose was decreased from 20mg to 10 mg PO daily by cardiology. At arrival to ED an EKG was performed and it showed normal sinus rhythm with some PACs and negative tropininsx3. Cardiology was consulted and recommended to proceed with left heart cath. Left heart cath was performed on 6/15/20 and showed calcificate diagonal disease which will need PCI  with atherectomy that will be schedule outpatient at [facility]. During hospital course  patient was noticed with positive orthostatics vital signs and lisinopril dose was decreased to 5 mg PO daily and received IV fluid. Repeated orthostatics vital signs before discharge were negative. Patient is hemodynamically stable, asymptomatic and will be transfer to [facility] to continue treatment and possible PCI with atherectomy with Dr Stevenson.         I contacted Dr Ceron and recommended to start Metroprolol 25 mg PO BID once BP tolerate it and to increase dose of rosuvastatin to 10 mg PO daily. Pt advised to contact Dr Ceron  for make a follow appointment and to notify if is experiencing low blood pressure with current medication regimen.        Vital Signs Last 24 Hrs    T(C): 36.8 (16 Jun 2020 08:46), Max: 36.8 (15 Solo 2020 20:35)    T(F): 98.2 (16 Jun 2020 08:46), Max: 98.3 (15 Solo 2020 20:35)    HR: 70 (15 Solo 2020 20:35) (62 - 74)    BP: 130/55 (15 Solo 2020 20:35) (123/57 - 132/74)    BP(mean): --    RR: 95 (16 Jun 2020 08:46) (14 - 99)    SpO2: 93% (15 Solo 2020 13:48) (93% - 97%)        Physical Exam     Gen: NAD, comfortable    CV: RRR, nl s1/s2, no M/R/G    Pulm: nl respiratory effort, CTAB, no wheezes/crackles/rhonchi    Abd: normoactive bowel sounds in all 4 quadrants, soft, nontender, nondistended, no rebound, no guarding, no masses    Extremities: no pedal edema, decrese pedal pulses    Neuro: A&Ox3, answering questions appropriately, PERRL, EOMI, face symmetric,5/5 strength in upper and lower extremities bilaterally, sensation intact in upper and lower extremities bilaterally        RADIOLOGIC:      Xray Chest 1 View-PORTABLE IMMEDIATE (06.14.20 @ 17:31) >        FINDINGS:        Lungs: The lungs are clear.    Heart: The heart is normal in size.    Mediastinum: The mediastinum is within normal limits.        IMPRESSION:        Clear lungs.            medicine attending addendum- pati hayes man had atypical anginal sx's and a positive outpatient NST. thus he underwent LHC that revealed a calcified lesion that will necessitate intervention at [facility]. he has been pain free while here. he also was having sx's of light headedness that seemed separate     and apart from the CAD sx's. thius coincided with him losing over 20 pounds but his BP meds had not been changed. he had orthstatic changes and sx's here c/w the impact of his vasoactive medications causing his sx's. we are DC'ing him on a reduced dose of his ACE-I. BB's were added so instructions re sx's were reviewed and cards will titrate down further as needed. total time spent ~35 min        addendum #2- Mr. Morris's DC was cancelled yesterday as he remained orthostatic with symptoms. we stopped all vasoactive medications including his tamsulosin and provided ivf's. his orthostatic BP changes resolved, however his lightheadedness persists. he has a h/o vertigo but current sx's are different. he has no nystagmus on exam (neg Darnell pike to the left). also, he had some recurrence of the subsscapular pressure that initiated the cardiac workup (pain free now). he requested going to [facility] where his fromer doc is.    i spoke with Dr. Cabrales whom agreed to take Mr. ESCOBAR in transfer today. we discussed the above issues.    Mr. N is also aware of the need for follow up after his PCI. I suggested an MRI of the IAC and an ENT evaluation- he expressed verbal understanding.        total time ~33 min

## 2020-06-15 NOTE — PROGRESS NOTE ADULT - ASSESSMENT
Pt has been seen and examined with FP resident, resident supervised agree with a/p       Patient is a 69y old  Male who presents with a chief complaint of Unstable angina, ACS (15 Solo 2020 10:33)          PHYSICAL EXAM:  Vital Signs Last 24 Hrs  T(C): 36.3 (15 Solo 2020 12:01), Max: 36.8 (15 Solo 2020 06:00)  T(F): 97.4 (15 Solo 2020 12:01), Max: 98.3 (15 Solo 2020 06:00)  HR: 74 (15 Solo 2020 13:48) (61 - 84)  BP: 123/57 (15 Solo 2020 13:48) (110/68 - 152/78)  BP(mean): 98 (15 Solo 2020 11:00) (77 - 98)  RR: 14 (15 Solo 2020 13:48) (14 - 20)  SpO2: 93% (15 Solo 2020 13:48) (93% - 99%)  -rs-aeeb, cta  -cvs-s1s2 normal   -p/a-soft,bs+      A/P    #d/c today with further management of CAD as an outpt, time spent 45 minutes Pt has been seen and examined with FP resident, resident supervised agree with a/p       Patient is a 69y old  Male who presents with a chief complaint of Unstable angina, ACS (15 Solo 2020 10:33)          PHYSICAL EXAM:  Vital Signs Last 24 Hrs  T(C): 36.3 (15 Solo 2020 12:01), Max: 36.8 (15 Solo 2020 06:00)  T(F): 97.4 (15 Solo 2020 12:01), Max: 98.3 (15 Solo 2020 06:00)  HR: 74 (15 Solo 2020 13:48) (61 - 84)  BP: 123/57 (15 Solo 2020 13:48) (110/68 - 152/78)  BP(mean): 98 (15 Solo 2020 11:00) (77 - 98)  RR: 14 (15 Solo 2020 13:48) (14 - 20)  SpO2: 93% (15 Solo 2020 13:48) (93% - 99%)  -rs-aeeb, cta  -cvs-s1s2 normal   -p/a-soft,bs+      A/P    #d/c today with further management of CAD as an outpt, time spent 45 minutes	    #Running low bp- advised to consider BB as an outpt if his BP allows

## 2020-06-15 NOTE — CONSULT NOTE ADULT - SUBJECTIVE AND OBJECTIVE BOX
Patient is a 69y old  Male who presents with a chief complaint of Unstable angina, ACS.       HPI:  Pt is a 70 yo male with a pmh/o nephrolithiasis, inguinal/umbilical hernia s/p repair, HTN, HLD, bronchitis with h/o COVID infection in March (not hospitalized) who tested negative in May, who has a family history of premature heart disease in father who suffered several MIs' starting in early 's, who is an ex smoker, who presented to ED today due to 2 weeks of worsening fog like lightheadedness, weakness, decreased exercise tolerance, associated with band like subscapular pressure which occurs with exertion and at rest, is partially alleviated by tylenol, no aggravating factors, not painful in sensation but rather pressure like, and not position dependent. He had a  NST which showed mild positivity and holter monitor that showed PVCs and PACs.  Pt endorses intermittent diaphoresis with episodes. Takes ASA 81mg daily. Of note, pt states this AM with onset of sx his BP was 70/40 and he did not take his BP medication until the afternoon when his BP was high with DBP of 112. Pt normo/hypertensive in Ed.   His BP was fluctuating recently and dizziness.    Denies nausea, vomiting, palpitations, fever, cough, chills, SOB, abd pain, chest pain, HA, visual changes, paresthesias, vertigo, dysuria, diarrhea.This am denies any symptoms.        PAST MEDICAL & SURGICAL HISTORY:  H/O bronchitis: COVID+ 2020  Nephrolithiasis  HLD (hyperlipidemia)  HTN (hypertension)  H/O inguinal hernia repair      MEDICATIONS  (STANDING):  aspirin enteric coated 81 milliGRAM(s) Oral <User Schedule>  atorvastatin 20 milliGRAM(s) Oral at bedtime  famotidine    Tablet 20 milliGRAM(s) Oral <User Schedule>  gemfibrozil 600 milliGRAM(s) Oral <User Schedule>  lisinopril 20 milliGRAM(s) Oral <User Schedule>  montelukast 10 milliGRAM(s) Oral <User Schedule>  tamsulosin 0.4 milliGRAM(s) Oral at bedtime    MEDICATIONS  (PRN):  zolpidem 5 milliGRAM(s) Oral at bedtime PRN Insomnia  zolpidem 5 milliGRAM(s) Oral at bedtime PRN Insomnia      FAMILY HISTORY:  Patient's mother is : and so is father, both from heart disease, father with MI in early 40&#x27;s      SOCIAL HISTORY: no recent smoking     REVIEW OF SYSTEMS:  CONSTITUTIONAL:    No fatigue, malaise, lethargy.  No fever or chills.     RESPIRATORY:  No cough.  No wheeze.  No hemoptysis.  No shortness of breath.  CARDIOVASCULAR:  c/o chest pains.  No palpitations. No shortness of breath, No orthopnea or PND. c/o dizziness, diaphoresis.   GASTROINTESTINAL:  No abdominal pain.  No nausea or vomiting.    GENITOURINARY:    No hematuria.    MUSCULOSKELETAL:  No musculoskeletal pain.  No joint swelling.  No arthritis.  NEUROLOGICAL:  No tingling or numbness or weakness.  PSYCHIATRIC:  No confusion  SKIN:  No rashes.          Vital Signs Last 24 Hrs  T(C): 36.8 (15 Sool 2020 06:00), Max: 36.8 (15 Solo 2020 06:00)  T(F): 98.3 (15 Solo 2020 06:00), Max: 98.3 (15 Solo 2020 06:00)  HR: 73 (15 Solo 2020 06:00) (61 - 84)  BP: 110/68 (15 Solo 2020 06:00) (110/68 - 152/78)  BP(mean): --  RR: 18 (15 Solo 2020 06:00) (16 - 20)  SpO2: 96% (15 Solo 2020 06:00) (96% - 99%)    PHYSICAL EXAM-    Constitutional: The patient appears to be normal, well developed, well nourished and alert and oriented to time, place and person. The patient does not appear acutely ill.     Head: Head is normocephalic and atraumatic.      Neck: No jugular venous distention. No audible carotid bruits. There are strong carotid pulses bilaterally. No JVD.     Cardiovascular: Regular rate and rhythm without S3, S4. No murmurs or rubs are appreciated.      Respiratory: Breath sounds are normal. No rales. No wheezing.    Abdomen: Soft, nontender, nondistended with positive bowel sounds.      Extremity: No tenderness. No  pitting edema     Neurologic: The patient is alert and oriented.      Skin: No rash, no obvious lesions noted.      Psychiatric: The patient appears to be emotionally stable.      INTERPRETATION OF TELEMETRY: SR , SVT     ECG: Sinus rythm , normal axis, no ST T changes.     I&O's Detail    2020 07:01  -  15 Solo 2020 07:00  --------------------------------------------------------  IN:  Total IN: 0 mL    OUT:    Voided: 400 mL  Total OUT: 400 mL    Total NET: -400 mL          LABS:                        14.3   8.01  )-----------( 242      ( 2020 16:37 )             43.7     06-14    136  |  103  |  14  ----------------------------<  97  4.3   |  29  |  1.03    Ca    9.7      2020 16:37  Mg     2.3     06-14    TPro  8.3  /  Alb  4.2  /  TBili  0.7  /  DBili  x   /  AST  12<L>  /  ALT  26  /  AlkPhos  101  06-14    CARDIAC MARKERS ( 2020 20:39 )  <0.015 ng/mL / x     / x     / x     / x      CARDIAC MARKERS ( 2020 16:37 )  <0.015 ng/mL / x     / x     / x     / x              I&O's Summary    2020 07:  -  15 2020 07:00  --------------------------------------------------------  IN: 0 mL / OUT: 400 mL / NET: -400 mL      BNP  RADIOLOGY & ADDITIONAL STUDIES:

## 2020-06-15 NOTE — PROGRESS NOTE ADULT - SUBJECTIVE AND OBJECTIVE BOX
HPI  70 yo male with Pmh/o nephrolithiasis, inguinal/umbilical hernia s/p repair, HTN, HLD, bronchitis with h/o COVID infection in March (not hospitalized) who tested negative in May, who has a family history of premature heart disease in father who suffered several MIs' starting in early 40's, who is an ex smoker, who presented to ED due to 2 weeks of worsening fog like lightheadedness, weakness, decreased exercise tolerance, associated with band like subscapular pressure which occurs with exertion and at rest, is partially alleviated by tylenol,  Troponin negative, EKG with SR, intermittent PACs.   Pt brought to cath lab today for ischemic evaluation.    ASA class: II  Cr: 0.92  GFR: 96  Bleeding risk:     Now, pt is s/p LHC, revealed severely calcified Diagonal disease.      ROS: denies chest pain/ pressure, SOB or palpitation     Vital Signs;  T(C): 36.7 (06-15-20 @ 08:00), Max: 36.8 (06-15-20 @ 06:00)  HR: 73 (06-15-20 @ 06:00) (61 - 84)  BP: 110/68 (06-15-20 @ 06:00) (110/68 - 152/78)  RR: 19 (06-15-20 @ 08:00) (16 - 20)  SpO2: 96% (06-15-20 @ 08:00) (96% - 99%)    Physical Exam   General: awake, no acute distress   HEENT: NCAT, neck supple   CV: RRR, normal S1S2, no murmur/ rub   Pulmonary: clear, no wheezing or rales   GI: +BS, soft, non-tender, non-distended   : voiding freely   Extremities: no edema, + pedal pulses   Skin: no rashes or lesion. Rt. femoral access site (s/p manual compression): no hematoma or bleeding     Labs:                        13.4   7.06  )-----------( 213      ( 15 Solo 2020 07:10 )             41.1   06-15    137  |  105  |  16  ----------------------------<  96  4.4   |  27  |  0.92    Ca    9.3      15 Solo 2020 07:10  Phos  3.0     06-15  Mg     2.3     06-15    TPro  8.3  /  Alb  4.2  /  TBili  0.7  /  DBili  x   /  AST  12<L>  /  ALT  26  /  AlkPhos  101  06-14    CARDIAC MARKERS ( 15 Solo 2020 07:10 )  <0.015 ng/mL / x     / x     / x     / x      CARDIAC MARKERS ( 14 Jun 2020 20:39 )  <0.015 ng/mL / x     / x     / x     / x      CARDIAC MARKERS ( 14 Jun 2020 16:37 )  <0.015 ng/mL / x     / x     / x     / x        Medications:  aspirin enteric coated 81 milliGRAM(s) Oral <User Schedule>  atorvastatin 20 milliGRAM(s) Oral at bedtime  clopidogrel Tablet 75 milliGRAM(s) Oral daily  famotidine    Tablet 20 milliGRAM(s) Oral <User Schedule>  gemfibrozil 600 milliGRAM(s) Oral <User Schedule>  lisinopril 20 milliGRAM(s) Oral <User Schedule>  montelukast 10 milliGRAM(s) Oral <User Schedule>  tamsulosin 0.4 milliGRAM(s) Oral at bedtime  zolpidem 5 milliGRAM(s) Oral at bedtime PRN  zolpidem 5 milliGRAM(s) Oral at bedtime PRN    # CAD: s/p LHC- calcified Diagonal disease   - return to his unit   - continue ASA 81 mg daily   - start Plavix 75 mg daily (no loading needed)  - continue ACEI  - continue statin  - post procedure, outcome and follow up care reviewed with patient/MD  - discharge home today if medically stable  - plan for PCI of Diagonal with atherectomy at [facility] in near future     Discussed the plan with Dr. Haq, Dr. Brown, Cath RN and Pt. HPI  70 yo male with Pmh/o nephrolithiasis, inguinal/umbilical hernia s/p repair, HTN, HLD, bronchitis with h/o COVID infection in March (not hospitalized) who tested negative in May, who has a family history of premature heart disease in father who suffered several MIs' starting in early 40's, who is an ex smoker, who presented to ED due to 2 weeks of worsening fog like lightheadedness, weakness, decreased exercise tolerance, associated with band like subscapular pressure which occurs with exertion and at rest, is partially alleviated by tylenol,  Troponin negative, EKG with SR, intermittent PACs.   Pt brought to cath lab today for ischemic evaluation.    ASA class: II  Cr: 0.92  GFR: 96  Bleeding risk: 1.0%     Now, pt is s/p LHC, revealed severely calcified Diagonal disease.      ROS: denies chest pain/ pressure, SOB or palpitation     Vital Signs;  T(C): 36.7 (06-15-20 @ 08:00), Max: 36.8 (06-15-20 @ 06:00)  HR: 73 (06-15-20 @ 06:00) (61 - 84)  BP: 110/68 (06-15-20 @ 06:00) (110/68 - 152/78)  RR: 19 (06-15-20 @ 08:00) (16 - 20)  SpO2: 96% (06-15-20 @ 08:00) (96% - 99%)    Physical Exam   General: awake, no acute distress   HEENT: NCAT, neck supple   CV: RRR, normal S1S2, no murmur/ rub   Pulmonary: clear, no wheezing or rales   GI: +BS, soft, non-tender, non-distended   : voiding freely   Extremities: no edema, + pedal pulses   Skin: no rashes or lesion. Rt. femoral access site (s/p manual compression): no hematoma or bleeding     Labs:                        13.4   7.06  )-----------( 213      ( 15 Solo 2020 07:10 )             41.1   06-15    137  |  105  |  16  ----------------------------<  96  4.4   |  27  |  0.92    Ca    9.3      15 Solo 2020 07:10  Phos  3.0     06-15  Mg     2.3     06-15    TPro  8.3  /  Alb  4.2  /  TBili  0.7  /  DBili  x   /  AST  12<L>  /  ALT  26  /  AlkPhos  101  06-14    CARDIAC MARKERS ( 15 Solo 2020 07:10 )  <0.015 ng/mL / x     / x     / x     / x      CARDIAC MARKERS ( 14 Jun 2020 20:39 )  <0.015 ng/mL / x     / x     / x     / x      CARDIAC MARKERS ( 14 Jun 2020 16:37 )  <0.015 ng/mL / x     / x     / x     / x        Medications:  aspirin enteric coated 81 milliGRAM(s) Oral <User Schedule>  atorvastatin 20 milliGRAM(s) Oral at bedtime  clopidogrel Tablet 75 milliGRAM(s) Oral daily  famotidine    Tablet 20 milliGRAM(s) Oral <User Schedule>  gemfibrozil 600 milliGRAM(s) Oral <User Schedule>  lisinopril 20 milliGRAM(s) Oral <User Schedule>  montelukast 10 milliGRAM(s) Oral <User Schedule>  tamsulosin 0.4 milliGRAM(s) Oral at bedtime  zolpidem 5 milliGRAM(s) Oral at bedtime PRN  zolpidem 5 milliGRAM(s) Oral at bedtime PRN    # CAD: s/p LHC- calcified Diagonal disease   - return to his unit   - continue ASA 81 mg daily   - start Plavix 75 mg daily (no loading needed)  - continue ACEI  - continue statin  - post procedure, outcome and follow up care reviewed with patient/MD  - discharge home today if medically stable  - plan for PCI of Diagonal with atherectomy at [facility] in near future     Discussed the plan with Dr. Haq, Dr. Brown, Cath RN and Pt.

## 2020-06-15 NOTE — DISCHARGE NOTE PROVIDER - NSDCFUSCHEDAPPT_GEN_ALL_CORE_FT
SALVADOR HERRERA ; 06/16/2020 ; NORIS Howard [address] SALVADOR HERRERA ; 06/16/2020 ; NORIS Howard [address] SALVADOR HERRERA ; 06/16/2020 ; NORIS Howard [address]

## 2020-06-15 NOTE — DISCHARGE NOTE PROVIDER - NSDCMRMEDTOKEN_GEN_ALL_CORE_FT
aspirin 81 mg oral tablet: 1 tab(s) orally once a day  clopidogrel 75 mg oral tablet: 1 tab(s) orally once a day  famotidine 20 mg oral tablet: 1 tab(s) orally once a day  Flomax 0.4 mg oral capsule: 1 cap(s) orally once a day   gemfibrozil 600 mg oral tablet: 1 tab(s) orally 2 times a day  lisinopril 20 mg oral tablet: 1 tab(s) orally once a day  montelukast 10 mg oral tablet: 1 tab(s) orally once a day  rosuvastatin 5 mg oral tablet: 1 tab(s) orally once a day  zolpidem 12.5 mg oral tablet, extended release: 1 tab(s) orally once a day (at bedtime) aspirin 81 mg oral tablet: 1 tab(s) orally once a day  clopidogrel 75 mg oral tablet: 1 tab(s) orally once a day  famotidine 20 mg oral tablet: 1 tab(s) orally once a day  Flomax 0.4 mg oral capsule: 1 cap(s) orally once a day   gemfibrozil 600 mg oral tablet: 1 tab(s) orally 2 times a day  lisinopril 5 mg oral tablet: 1 tab(s) orally once a day   montelukast 10 mg oral tablet: 1 tab(s) orally once a day  rosuvastatin 5 mg oral tablet: 1 tab(s) orally once a day  zolpidem 12.5 mg oral tablet, extended release: 1 tab(s) orally once a day (at bedtime) aspirin 81 mg oral tablet: 1 tab(s) orally once a day  clopidogrel 75 mg oral tablet: 1 tab(s) orally once a day  famotidine 20 mg oral tablet: 1 tab(s) orally once a day  Flomax 0.4 mg oral capsule: 1 cap(s) orally once a day   gemfibrozil 600 mg oral tablet: 1 tab(s) orally 2 times a day  lisinopril 5 mg oral tablet: 1 tab(s) orally once a day   metoprolol tartrate 25 mg oral tablet: 1 tab(s) orally once a day   montelukast 10 mg oral tablet: 1 tab(s) orally once a day  rosuvastatin 10 mg oral tablet: 1 tab(s) orally once a day  zolpidem 12.5 mg oral tablet, extended release: 1 tab(s) orally once a day (at bedtime) aspirin 81 mg oral tablet: 1 tab(s) orally once a day  clopidogrel 75 mg oral tablet: 1 tab(s) orally once a day  famotidine 20 mg oral tablet: 1 tab(s) orally once a day  gemfibrozil 600 mg oral tablet: 1 tab(s) orally 2 times a day  lisinopril 5 mg oral tablet: 1 tab(s) orally once a day   montelukast 10 mg oral tablet: 1 tab(s) orally once a day  rosuvastatin 10 mg oral tablet: 1 tab(s) orally once a day  zolpidem 12.5 mg oral tablet, extended release: 1 tab(s) orally once a day (at bedtime)

## 2020-06-16 ENCOUNTER — APPOINTMENT (OUTPATIENT)
Dept: DISASTER EMERGENCY | Facility: CLINIC | Age: 70
End: 2020-06-16

## 2020-06-16 VITALS — TEMPERATURE: 98 F | RESPIRATION RATE: 95 BRPM

## 2020-06-16 LAB
ANION GAP SERPL CALC-SCNC: 4 MMOL/L — LOW (ref 5–17)
APPEARANCE UR: CLEAR — SIGNIFICANT CHANGE UP
BASOPHILS # BLD AUTO: 0.09 K/UL — SIGNIFICANT CHANGE UP (ref 0–0.2)
BASOPHILS NFR BLD AUTO: 1.3 % — SIGNIFICANT CHANGE UP (ref 0–2)
BILIRUB UR-MCNC: NEGATIVE — SIGNIFICANT CHANGE UP
BUN SERPL-MCNC: 14 MG/DL — SIGNIFICANT CHANGE UP (ref 7–23)
CALCIUM SERPL-MCNC: 8.9 MG/DL — SIGNIFICANT CHANGE UP (ref 8.5–10.1)
CHLORIDE SERPL-SCNC: 106 MMOL/L — SIGNIFICANT CHANGE UP (ref 96–108)
CO2 SERPL-SCNC: 26 MMOL/L — SIGNIFICANT CHANGE UP (ref 22–31)
COLOR SPEC: YELLOW — SIGNIFICANT CHANGE UP
CREAT SERPL-MCNC: 0.92 MG/DL — SIGNIFICANT CHANGE UP (ref 0.5–1.3)
DIFF PNL FLD: NEGATIVE — SIGNIFICANT CHANGE UP
EOSINOPHIL # BLD AUTO: 0.18 K/UL — SIGNIFICANT CHANGE UP (ref 0–0.5)
EOSINOPHIL NFR BLD AUTO: 2.6 % — SIGNIFICANT CHANGE UP (ref 0–6)
GLUCOSE SERPL-MCNC: 146 MG/DL — HIGH (ref 70–99)
GLUCOSE UR QL: NEGATIVE MG/DL — SIGNIFICANT CHANGE UP
HCT VFR BLD CALC: 40.8 % — SIGNIFICANT CHANGE UP (ref 39–50)
HGB BLD-MCNC: 13.7 G/DL — SIGNIFICANT CHANGE UP (ref 13–17)
IMM GRANULOCYTES NFR BLD AUTO: 0.3 % — SIGNIFICANT CHANGE UP (ref 0–1.5)
KETONES UR-MCNC: NEGATIVE — SIGNIFICANT CHANGE UP
LEUKOCYTE ESTERASE UR-ACNC: NEGATIVE — SIGNIFICANT CHANGE UP
LYMPHOCYTES # BLD AUTO: 1.41 K/UL — SIGNIFICANT CHANGE UP (ref 1–3.3)
LYMPHOCYTES # BLD AUTO: 20.2 % — SIGNIFICANT CHANGE UP (ref 13–44)
MAGNESIUM SERPL-MCNC: 2.1 MG/DL — SIGNIFICANT CHANGE UP (ref 1.6–2.6)
MCHC RBC-ENTMCNC: 30.2 PG — SIGNIFICANT CHANGE UP (ref 27–34)
MCHC RBC-ENTMCNC: 33.6 GM/DL — SIGNIFICANT CHANGE UP (ref 32–36)
MCV RBC AUTO: 90.1 FL — SIGNIFICANT CHANGE UP (ref 80–100)
MONOCYTES # BLD AUTO: 0.62 K/UL — SIGNIFICANT CHANGE UP (ref 0–0.9)
MONOCYTES NFR BLD AUTO: 8.9 % — SIGNIFICANT CHANGE UP (ref 2–14)
NEUTROPHILS # BLD AUTO: 4.66 K/UL — SIGNIFICANT CHANGE UP (ref 1.8–7.4)
NEUTROPHILS NFR BLD AUTO: 66.7 % — SIGNIFICANT CHANGE UP (ref 43–77)
NITRITE UR-MCNC: NEGATIVE — SIGNIFICANT CHANGE UP
PH UR: 5 — SIGNIFICANT CHANGE UP (ref 5–8)
PHOSPHATE SERPL-MCNC: 2.5 MG/DL — SIGNIFICANT CHANGE UP (ref 2.5–4.5)
PLATELET # BLD AUTO: 210 K/UL — SIGNIFICANT CHANGE UP (ref 150–400)
POTASSIUM SERPL-MCNC: 3.7 MMOL/L — SIGNIFICANT CHANGE UP (ref 3.5–5.3)
POTASSIUM SERPL-SCNC: 3.7 MMOL/L — SIGNIFICANT CHANGE UP (ref 3.5–5.3)
PROT UR-MCNC: NEGATIVE MG/DL — SIGNIFICANT CHANGE UP
RBC # BLD: 4.53 M/UL — SIGNIFICANT CHANGE UP (ref 4.2–5.8)
RBC # FLD: 14.1 % — SIGNIFICANT CHANGE UP (ref 10.3–14.5)
SODIUM SERPL-SCNC: 136 MMOL/L — SIGNIFICANT CHANGE UP (ref 135–145)
SP GR SPEC: 1.01 — SIGNIFICANT CHANGE UP (ref 1.01–1.02)
UROBILINOGEN FLD QL: NEGATIVE MG/DL — SIGNIFICANT CHANGE UP
WBC # BLD: 6.98 K/UL — SIGNIFICANT CHANGE UP (ref 3.8–10.5)
WBC # FLD AUTO: 6.98 K/UL — SIGNIFICANT CHANGE UP (ref 3.8–10.5)

## 2020-06-16 PROCEDURE — 99239 HOSP IP/OBS DSCHRG MGMT >30: CPT | Mod: GC

## 2020-06-16 RX ADMIN — Medication 600 MILLIGRAM(S): at 11:59

## 2020-06-16 RX ADMIN — Medication 81 MILLIGRAM(S): at 11:59

## 2020-06-16 RX ADMIN — FAMOTIDINE 20 MILLIGRAM(S): 10 INJECTION INTRAVENOUS at 11:59

## 2020-06-16 RX ADMIN — CLOPIDOGREL BISULFATE 75 MILLIGRAM(S): 75 TABLET, FILM COATED ORAL at 11:59

## 2020-06-16 NOTE — PROGRESS NOTE ADULT - REASON FOR ADMISSION
Unstable angina, ACS

## 2020-06-16 NOTE — PROGRESS NOTE ADULT - SUBJECTIVE AND OBJECTIVE BOX
Patient is a 69y old  Male who presents with a chief complaint of Unstable angina, ACS.       HPI:  Pt is a 70 yo male with a pmh/o nephrolithiasis, inguinal/umbilical hernia s/p repair, HTN, HLD, bronchitis with h/o COVID infection in March (not hospitalized) who tested negative in May, who has a family history of premature heart disease in father who suffered several MIs' starting in early 40s, who is an ex smoker, who presented to ED today due to 2 weeks of worsening fog like lightheadedness, weakness, decreased exercise tolerance, associated with band like subscapular pressure which occurs with exertion and at rest, is partially alleviated by tylenol, no aggravating factors, not painful in sensation but rather pressure like, and not position dependent. He had a  NST which showed mild positivity and holter monitor that showed PVCs and PACs.  Pt endorses intermittent diaphoresis with episodes. Takes ASA 81mg daily. Of note, pt states this AM with onset of sx his BP was 70/40 and he did not take his BP medication until the afternoon when his BP was high with DBP of 112. Pt normo/hypertensive in Ed.   His BP was fluctuating recently and dizziness.    Denies nausea, vomiting, palpitations, fever, cough, chills, SOB, abd pain, chest pain, HA, visual changes, paresthesias, vertigo, dysuria, diarrhea.This am denies any symptoms.        - pt seen and examined by me this am .  He denies any CP or SOB or dizziness.  Yesterday evening he was noted to be dizzy, orthostatic and he was hydrated overnight.    PAST MEDICAL & SURGICAL HISTORY:  H/O bronchitis: COVID+ 2020  Nephrolithiasis  HLD (hyperlipidemia)  HTN (hypertension)  H/O inguinal hernia repair      MEDICATIONS  (STANDING):  aspirin enteric coated 81 milliGRAM(s) Oral <User Schedule>  atorvastatin 20 milliGRAM(s) Oral at bedtime  famotidine    Tablet 20 milliGRAM(s) Oral <User Schedule>  gemfibrozil 600 milliGRAM(s) Oral <User Schedule>  lisinopril 20 milliGRAM(s) Oral <User Schedule>  montelukast 10 milliGRAM(s) Oral <User Schedule>  tamsulosin 0.4 milliGRAM(s) Oral at bedtime    MEDICATIONS  (PRN):  zolpidem 5 milliGRAM(s) Oral at bedtime PRN Insomnia  zolpidem 5 milliGRAM(s) Oral at bedtime PRN Insomnia      FAMILY HISTORY:  Patient's mother is : and so is father, both from heart disease, father with MI in early 40&#x27;s      SOCIAL HISTORY: no recent smoking     REVIEW OF SYSTEMS:  CONSTITUTIONAL:    No fatigue, malaise, lethargy.  No fever or chills.     RESPIRATORY:  No cough.  No wheeze.  No hemoptysis.  No shortness of breath.  CARDIOVASCULAR:  no chest pains.  No palpitations. No shortness of breath, No orthopnea or PND. no dizziness, diaphoresis.   GASTROINTESTINAL:  No abdominal pain.  No nausea or vomiting.    GENITOURINARY:    No hematuria.    MUSCULOSKELETAL:  No musculoskeletal pain.  No joint swelling.  No arthritis.  NEUROLOGICAL:  No tingling or numbness or weakness.  PSYCHIATRIC:  No confusion  SKIN:  No rashes.          ICU Vital Signs Last 24 Hrs  T(C): 36.8 (2020 08:46), Max: 36.8 (15 Solo 2020 20:35)  T(F): 98.2 (2020 08:46), Max: 98.3 (15 Solo 2020 20:35)  HR: 70 (15 Solo 2020 20:35) (62 - 74)  BP: 130/55 (15 Solo 2020 20:35) (123/57 - 133/86)  BP(mean): 98 (15 Solo 2020 11:00) (91 - 98)  ABP: --  ABP(mean): --  RR: 95 (2020 08:46) (14 - 99)  SpO2: 93% (15 Solo 2020 13:48) (93% - 98%)        PHYSICAL EXAM-    Constitutional: The patient appears to be normal, well developed, well nourished and alert and oriented to time, place and person. The patient does not appear acutely ill.     Head: Head is normocephalic and atraumatic.      Neck: No jugular venous distention. No audible carotid bruits. There are strong carotid pulses bilaterally. No JVD.     Cardiovascular: Regular rate and rhythm without S3, S4. No murmurs or rubs are appreciated.      Respiratory: Breath sounds are normal. No rales. No wheezing.    Abdomen: Soft, nontender, nondistended with positive bowel sounds.      Extremity: No tenderness. No  pitting edema     Neurologic: The patient is alert and oriented.      Skin: No rash, no obvious lesions noted.      Psychiatric: The patient appears to be emotionally stable.      INTERPRETATION OF TELEMETRY: SR , sinus arrythmia     ECG: Sinus rythm , normal axis, no ST T changes.     I&O's Detail    2020 07:01  -  15 Solo 2020 07:00  --------------------------------------------------------  IN:  Total IN: 0 mL    OUT:    Voided: 400 mL  Total OUT: 400 mL    Total NET: -400 mL          LABS:                                   13.7   6.98  )-----------( 210      ( 2020 08:26 )             40.8     06-16    136  |  106  |  14  ----------------------------<  146<H>  3.7   |  26  |  0.92    Ca    8.9      2020 08:26  Phos  2.5     06-16  Mg     2.1     06-16    TPro  8.3  /  Alb  4.2  /  TBili  0.7  /  DBili  x   /  AST  12<L>  /  ALT  26  /  AlkPhos  101  06-14    CARDIAC MARKERS ( 15 Solo 2020 07:10 )  <0.015 ng/mL / x     / x     / x     / x      CARDIAC MARKERS ( 2020 20:39 )  <0.015 ng/mL / x     / x     / x     / x      CARDIAC MARKERS ( 2020 16:37 )  <0.015 ng/mL / x     / x     / x     / x          LIVER FUNCTIONS - ( 2020 16:37 )  Alb: 4.2 g/dL / Pro: 8.3 gm/dL / ALK PHOS: 101 U/L / ALT: 26 U/L / AST: 12 U/L / GGT: x           PT/INR - ( 15 Solo 2020 07:10 )   PT: 13.1 sec;   INR: 1.17 ratio         PTT - ( 15 Solo 2020 07:10 )  PTT:36.0 sec  06-15 Chol 122 LDL 73 HDL 29<L> Trig 102        I&O's Summary    2020 07:01  -  15 2020 07:00  --------------------------------------------------------  IN: 0 mL / OUT: 400 mL / NET: -400 mL      BNP  RADIOLOGY & ADDITIONAL STUDIES:

## 2020-06-16 NOTE — PROGRESS NOTE ADULT - SUBJECTIVE AND OBJECTIVE BOX
HPI:  Pt is a 70 yo male with a pmh/o nephrolithiasis, inguinal/umbilical hernia s/p repair, HTN, HLD, bronchitis with h/o COVID infection in March (not hospitalized) who tested negative in May, who has a family history of premature heart disease in father who suffered several MIs' starting in early 40's, who is an ex smoker, who presented to ED today due to 2 weeks of worsening fog like lightheadedness, weakness, decreased exercise tolerance, associated with band like subscapular pressure which occurs with exertion and at rest, is partially alleviated by tylenol, no aggrevating factors, not painful in sensation but rather pressure like, and not position dependent. Pt states he has been to ED twice for same sx and f/u with Dr. Ceron as outpatient for NST and holter monitor and was told friday that he will need an angiogram and that he was found to have extra beats on his holter monitor which were similar to his previous holter test however now 'has extra beats on the bottom and top of heart'. Pt endorses intermittent diaphoresis with episodes. Takes ASA 81mg daily. Of note, pt states this AM with onset of sx his BP was 70/40 and he did not take his BP medication until the afternoon when his BP was high with DBP of 112. Pt normo/hypertensive in Ed. Denies nausea, vomiting, palpitations, fever, cough, chills, SOB, abd pain, chest pain, HA, visual changes, paresthesias, vertigo, dysuria, diarrhea.     Subjective:  Pt is evaluated at bedside and found NAD and in no acute distress. Yesterday  patient found with elevated orthostatic and received IV hydration overnight. Today morning repeated orthostatic were negative. Pt report continue with lightheadedness which describes as constant and exacerbated by movement of the neck. He endorse has a hx of vertigo but current symptoms are not similar to one that he gets with vertigo. Patient was found with LAD and diagonal obstructed and calcified lesion on C one day ago. Plan was to intervene at [facility] but patient  requested to be transfer to [facility] to continue management. He denies chest pain, SOB, dyaphoresis, dizzines and any other symptoms.       MEDICATIONS  (STANDING):  aspirin enteric coated 81 milliGRAM(s) Oral <User Schedule>  atorvastatin 20 milliGRAM(s) Oral at bedtime  clopidogrel Tablet 75 milliGRAM(s) Oral daily  famotidine    Tablet 20 milliGRAM(s) Oral <User Schedule>  gemfibrozil 600 milliGRAM(s) Oral <User Schedule>  montelukast 10 milliGRAM(s) Oral <User Schedule>  sodium chloride 0.9%. 1000 milliLiter(s) (75 mL/Hr) IV Continuous <Continuous>    MEDICATIONS  (PRN):  zolpidem 5 milliGRAM(s) Oral at bedtime PRN Insomnia  zolpidem 5 milliGRAM(s) Oral at bedtime PRN Insomnia      Vital Signs Last 24 Hrs  T(C): 36.8 (2020 08:46), Max: 36.8 (15 Solo 2020 20:35)  T(F): 98.2 (2020 08:46), Max: 98.3 (15 Solo 2020 20:35)  HR: 70 (15 Solo 2020 20:35) (70 - 70)  BP: 130/55 (15 Solo 2020 20:35) (130/55 - 130/55)  BP(mean): --  RR: 95 (2020 08:46) (95 - 99)  SpO2: --    GEN: NAD, comfortable, resting in bed  HEENT: NC/AT, EOMI, PERRLA, MMM, clear conjunctiva and sclera, normal hearing, no nasal discharge, throat clear, no thrush, normal dentition.   NECK: supple, no JVD, no LAD, no thyromegaly  BACK:  ROM intact, no spinal/paraspinal tenderness  CV: S1S2, RRR, no mumur  RESP: good air movement, CTABL, no rales, rhonchi or wheezing, respirations unlabored  ABD: +BS, soft, ND, NT, no guarding, no rigidity, no HSM  EXT: +2 radial and pedal pulses, no edema, no calve tenderness  SKIN: No visible Rashes or Ulcers  MSK: ROM intact in all extremities  NEURO:  sensory and CN 2-12 Grossly intact, no motor deficits, no, saddle anesthesia, AOx3  PSYCH: normal behavior         LABS:                          13.7   6.98  )-----------( 210      ( 2020 08:26 )             40.8     2020 08:26    136    |  106    |  14     ----------------------------<  146    3.7     |  26     |  0.92     Ca    8.9        2020 08:26  Phos  2.5       2020 08:26  Mg     2.1       2020 08:26        PT/INR - ( 15 Solo 2020 07:10 )   PT: 13.1 sec;   INR: 1.17 ratio         PTT - ( 15 Solo 2020 07:10 )  PTT:36.0 sec  CAPILLARY BLOOD GLUCOSE        CARDIAC MARKERS ( 15 Solo 2020 07:10 )  <0.015 ng/mL / x     / x     / x     / x      CARDIAC MARKERS ( 2020 20:39 )  <0.015 ng/mL / x     / x     / x     / x          Urinalysis Basic - ( 2020 06:12 )    Color: Yellow / Appearance: Clear / S.010 / pH: x  Gluc: x / Ketone: Negative  / Bili: Negative / Urobili: Negative mg/dL   Blood: x / Protein: Negative mg/dL / Nitrite: Negative   Leuk Esterase: Negative / RBC: x / WBC x   Sq Epi: x / Non Sq Epi: x / Bacteria: x        RADIOLOGY: HPI:  Pt is a 68 yo male with a pmh/o nephrolithiasis, inguinal/umbilical hernia s/p repair, HTN, HLD, bronchitis with h/o COVID infection in March (not hospitalized) who tested negative in May, who has a family history of premature heart disease in father who suffered several MIs' starting in early 40's, who is an ex smoker, who presented to ED due to 2 weeks of worsening fog like lightheadedness, weakness, decreased exercise tolerance, associated with band like subscapular pressure which occurs with exertion and at rest, is partially alleviated by tylenol, no aggrevating factors, not painful in sensation but rather pressure like, and not position dependent. Pt states he has been to ED twice for same sx and f/u with Dr. Ceron as outpatient for NST and holter monitor and was told friday that he will need an angiogram and that he was found to have extra beats on his holter monitor which were similar to his previous holter test however now 'has extra beats on the bottom and top of heart'. Pt endorses intermittent diaphoresis with episodes. Takes ASA 81mg daily. Of note, pt states this AM with onset of sx his BP was 70/40 and he did not take his BP medication until the afternoon when his BP was high with DBP of 112. Pt normo/hypertensive in Ed. Denies nausea, vomiting, palpitations, fever, cough, chills, SOB, abd pain, chest pain, HA, visual changes, paresthesias, vertigo, dysuria, diarrhea.     Subjective:  Pt is evaluated at bedside and found NAD and in no acute distress. Yesterday  patient found with positive orthostatic vital signs and received IV hydration overnight. Today morning repeated orthostatics vital signs were negative. Pt reports continue with lightheadedness which describes as constant and exacerbated by movements of the neck. He endorse has a hx of vertigo but current symptoms are not similar to vertigo. Patient was found with LAD and diagonal obstructed and calcified lesion on C one day ago. Plan was to intervene at [facility] but patient  requested to be transfer to [facility] to continue management. He denies chest pain, SOB, diachoresis, dizziness and any other symptoms.       MEDICATIONS  (STANDING):  aspirin enteric coated 81 milliGRAM(s) Oral <User Schedule>  atorvastatin 20 milliGRAM(s) Oral at bedtime  clopidogrel Tablet 75 milliGRAM(s) Oral daily  famotidine    Tablet 20 milliGRAM(s) Oral <User Schedule>  gemfibrozil 600 milliGRAM(s) Oral <User Schedule>  montelukast 10 milliGRAM(s) Oral <User Schedule>  sodium chloride 0.9%. 1000 milliLiter(s) (75 mL/Hr) IV Continuous <Continuous>    MEDICATIONS  (PRN):  zolpidem 5 milliGRAM(s) Oral at bedtime PRN Insomnia  zolpidem 5 milliGRAM(s) Oral at bedtime PRN Insomnia      Vital Signs Last 24 Hrs  T(C): 36.8 (2020 08:46), Max: 36.8 (15 Solo 2020 20:35)  T(F): 98.2 (2020 08:46), Max: 98.3 (15 Solo 2020 20:35)  HR: 70 (15 Solo 2020 20:35) (70 - 70)  BP: 130/55 (15 Solo 2020 20:35) (130/55 - 130/55)  BP(mean): --  RR: 95 (2020 08:46) (95 - 99)  SpO2: --    GEN: NAD, comfortable, resting in bed  HEENT: NC/AT, EOMI, PERRLA, MMM, clear conjunctiva and sclera, normal hearing, no nasal discharge, throat clear, no thrush, normal dentition.   NECK: supple, no JVD, no LAD, no thyromegaly  CV: S1S2, RRR, no murmur  RESP: good air movement, CTABL, no rales, rhonchi or wheezing, respirations unlabored  ABD: +BS, soft, ND, NT, no guarding, no rigidity, no HSM  EXT: no edema, no calve tenderness  SKIN: fungal infection on both feet more prominent in the right one   MSK: ROM intact in all extremities  NEURO:  sensory and CN 2-12 Grossly intact, no motor deficits, no, saddle anesthesia, AOx3  PSYCH: normal behavior       LABS:                          13.7   6.98  )-----------( 210      ( 2020 08:26 )             40.8     2020 08:26    136    |  106    |  14     ----------------------------<  146    3.7     |  26     |  0.92     Ca    8.9        2020 08:26  Phos  2.5       2020 08:26  Mg     2.1       2020 08:26        PT/INR - ( 15 Solo 2020 07:10 )   PT: 13.1 sec;   INR: 1.17 ratio         PTT - ( 15 Solo 2020 07:10 )  PTT:36.0 sec  CAPILLARY BLOOD GLUCOSE        CARDIAC MARKERS ( 15 Solo 2020 07:10 )  <0.015 ng/mL / x     / x     / x     / x      CARDIAC MARKERS ( 2020 20:39 )  <0.015 ng/mL / x     / x     / x     / x          Urinalysis Basic - ( 2020 06:12 )    Color: Yellow / Appearance: Clear / S.010 / pH: x  Gluc: x / Ketone: Negative  / Bili: Negative / Urobili: Negative mg/dL   Blood: x / Protein: Negative mg/dL / Nitrite: Negative   Leuk Esterase: Negative / RBC: x / WBC x   Sq Epi: x / Non Sq Epi: x / Bacteria: x        RADIOLOGY:     Xray Chest 1 View-PORTABLE IMMEDIATE (20 @ 17:31) >  FINDINGS:    Lungs: The lungs are clear.  Heart: The heart is normal in size.  Mediastinum: The mediastinum is within normal limits.    IMPRESSION:    Clear lungs.

## 2020-06-16 NOTE — PROGRESS NOTE ADULT - ASSESSMENT
Chest pain- s/p LHC yesterday  LAD and Diagonal lesions noted that are calcified and obstructive.  Initially plan was to intervene in [facility]. But per pt his family is persuaded him to go to [facility].  D/W Dr Sebastian - hospitalist attending.  Plan for transfer today.  Will continue DAPT statin.    HTN- BP optimal.  orthostasis resolved.      Hyperlipidemia- continue statin.    SVT-sinus arrythmia overnight.  Monitoring for now.     Other medical issues- Management per primary team.   Thank you for allowing me to participate in the care of this patient. Please feel free to contact me with any questions.

## 2020-06-16 NOTE — PROGRESS NOTE ADULT - ASSESSMENT
68 yo male with pmh/o HTN, HLD, former smoker, who had abnormal NST outpatient and present for evaluation of worsening fog like lightheadedness, weakness, decreased exercise tolerance and subscapular pressure of 2 weeks of evolution.     #ACS  - NST outpatient  was abnormal   - Holter monitor outpatient showed " extra beats"   - EKG at admission showed NSR and some PACs  - Negative troponinsx3   - Continue Clopidogrel 75mg PO daily   - Continue ASA and statin   - Cardiology recommendations appreciated   - Cardiac cath performed on 6/15/20 showed calcified diagonal disease    - Plan was to perform a PCI with arthrectomy at [facility] but patient  requested to be transfer to [facility] to continue management.  - Pt will be transferred to [facility] and will continue treatment with Dr Cabrales    #Orthostatic hypotension   - Multiple positive orthostatic vital signs one day ago   - s/p 75 ml/hr NS for 10 hrs   - Orthostatic vitals signs negative this morning  - Continue lisinopril 5mg POdaily   - Hold tamsulosin. Consider to reestart  medication if develop symptoms concerning of urinary retention  - Fall precautions   - Monitor VS     #HTN/HLD  - Continue rosuvastatin 10mg PO daily   - Continue lisinopril 5mg PO daily   - Continue DASH diet     #BPH  - Hold Flomax due to orthostatic hypotension     #DVT ppx  - Improve score 1  - SCDs   - Encourage ambulation     Case discussed with Dr Sebastian

## 2020-06-18 DIAGNOSIS — I95.1 ORTHOSTATIC HYPOTENSION: ICD-10-CM

## 2020-06-18 DIAGNOSIS — Z86.19 PERSONAL HISTORY OF OTHER INFECTIOUS AND PARASITIC DISEASES: ICD-10-CM

## 2020-06-18 DIAGNOSIS — I24.9 ACUTE ISCHEMIC HEART DISEASE, UNSPECIFIED: ICD-10-CM

## 2020-06-18 DIAGNOSIS — N40.0 BENIGN PROSTATIC HYPERPLASIA WITHOUT LOWER URINARY TRACT SYMPTOMS: ICD-10-CM

## 2020-06-18 DIAGNOSIS — I47.1 SUPRAVENTRICULAR TACHYCARDIA: ICD-10-CM

## 2020-06-18 DIAGNOSIS — I49.3 VENTRICULAR PREMATURE DEPOLARIZATION: ICD-10-CM

## 2020-06-18 DIAGNOSIS — Z87.891 PERSONAL HISTORY OF NICOTINE DEPENDENCE: ICD-10-CM

## 2020-06-18 DIAGNOSIS — I10 ESSENTIAL (PRIMARY) HYPERTENSION: ICD-10-CM

## 2020-06-18 DIAGNOSIS — E78.5 HYPERLIPIDEMIA, UNSPECIFIED: ICD-10-CM

## 2020-07-06 ENCOUNTER — RX CHANGE (OUTPATIENT)
Age: 70
End: 2020-07-06

## 2020-07-10 ENCOUNTER — APPOINTMENT (OUTPATIENT)
Dept: DERMATOLOGY | Facility: CLINIC | Age: 70
End: 2020-07-10
Payer: COMMERCIAL

## 2020-07-10 VITALS — BODY MASS INDEX: 25.9 KG/M2 | WEIGHT: 185 LBS | HEIGHT: 71 IN

## 2020-07-10 DIAGNOSIS — Z79.899 OTHER LONG TERM (CURRENT) DRUG THERAPY: ICD-10-CM

## 2020-07-10 DIAGNOSIS — L40.0 PSORIASIS VULGARIS: ICD-10-CM

## 2020-07-10 PROCEDURE — 99204 OFFICE O/P NEW MOD 45 MIN: CPT

## 2020-07-10 RX ORDER — DESONIDE 0.5 MG/G
0.05 OINTMENT TOPICAL TWICE DAILY
Qty: 1 | Refills: 3 | Status: ACTIVE | COMMUNITY
Start: 2020-07-10 | End: 1900-01-01

## 2020-07-13 PROBLEM — N20.0 CALCULUS OF KIDNEY: Chronic | Status: ACTIVE | Noted: 2020-06-14

## 2020-07-27 ENCOUNTER — TRANSCRIPTION ENCOUNTER (OUTPATIENT)
Age: 70
End: 2020-07-27

## 2020-09-04 ENCOUNTER — RX RENEWAL (OUTPATIENT)
Age: 70
End: 2020-09-04

## 2020-09-29 NOTE — ED ADULT NURSE NOTE - CHPI ED NUR DURATION
86 y/o female PMH MI CAD s/p CABG x 4, HTN, HLD and AAA, presents with one week history of nausea and now two days of vomiting , patient was recently discharged from  hospital after being treated for ARF and  bacteremia and sepsis 2/2 left 8.8 midureteral calculus and post obstructive ARF last month, patient underwent Cystourethroscopy with insertion of stent and UTI with Klebsiella pneumoniae, was sent home on oral antibiotics and they finished about two weeks ago.  Today denied any flank pain- admits to dysuria and frequency and nausea nd vomiting in ED is found to have positive UA and ARF. States she has been taking her medications regularly despite her nausea and only today since being in ED she hasn't taken her home meds   Now denied any fever or chills denied any cough or sick contacts
today

## 2020-09-30 ENCOUNTER — EMERGENCY (EMERGENCY)
Facility: HOSPITAL | Age: 70
LOS: 0 days | Discharge: LEFT BEFORE DISCHARGE | End: 2020-10-01
Attending: EMERGENCY MEDICINE
Payer: MEDICARE

## 2020-09-30 VITALS
TEMPERATURE: 98 F | OXYGEN SATURATION: 98 % | HEIGHT: 70 IN | DIASTOLIC BLOOD PRESSURE: 67 MMHG | SYSTOLIC BLOOD PRESSURE: 162 MMHG | WEIGHT: 199.96 LBS | HEART RATE: 59 BPM | RESPIRATION RATE: 16 BRPM

## 2020-09-30 DIAGNOSIS — Z87.442 PERSONAL HISTORY OF URINARY CALCULI: ICD-10-CM

## 2020-09-30 DIAGNOSIS — Z98.890 OTHER SPECIFIED POSTPROCEDURAL STATES: Chronic | ICD-10-CM

## 2020-09-30 DIAGNOSIS — Z86.73 PERSONAL HISTORY OF TRANSIENT ISCHEMIC ATTACK (TIA), AND CEREBRAL INFARCTION WITHOUT RESIDUAL DEFICITS: ICD-10-CM

## 2020-09-30 DIAGNOSIS — Z88.0 ALLERGY STATUS TO PENICILLIN: ICD-10-CM

## 2020-09-30 DIAGNOSIS — R20.0 ANESTHESIA OF SKIN: ICD-10-CM

## 2020-09-30 DIAGNOSIS — Z79.01 LONG TERM (CURRENT) USE OF ANTICOAGULANTS: ICD-10-CM

## 2020-09-30 DIAGNOSIS — Z79.82 LONG TERM (CURRENT) USE OF ASPIRIN: ICD-10-CM

## 2020-09-30 DIAGNOSIS — F41.9 ANXIETY DISORDER, UNSPECIFIED: ICD-10-CM

## 2020-09-30 DIAGNOSIS — I48.91 UNSPECIFIED ATRIAL FIBRILLATION: ICD-10-CM

## 2020-09-30 DIAGNOSIS — E78.5 HYPERLIPIDEMIA, UNSPECIFIED: ICD-10-CM

## 2020-09-30 PROCEDURE — 70450 CT HEAD/BRAIN W/O DYE: CPT | Mod: 26

## 2020-09-30 PROCEDURE — 81003 URINALYSIS AUTO W/O SCOPE: CPT

## 2020-09-30 PROCEDURE — 80053 COMPREHEN METABOLIC PANEL: CPT

## 2020-09-30 PROCEDURE — 84484 ASSAY OF TROPONIN QUANT: CPT

## 2020-09-30 PROCEDURE — 99284 EMERGENCY DEPT VISIT MOD MDM: CPT | Mod: 25

## 2020-09-30 PROCEDURE — 99285 EMERGENCY DEPT VISIT HI MDM: CPT

## 2020-09-30 PROCEDURE — 82962 GLUCOSE BLOOD TEST: CPT

## 2020-09-30 PROCEDURE — 85610 PROTHROMBIN TIME: CPT

## 2020-09-30 PROCEDURE — 93005 ELECTROCARDIOGRAM TRACING: CPT

## 2020-09-30 PROCEDURE — 70450 CT HEAD/BRAIN W/O DYE: CPT

## 2020-09-30 PROCEDURE — 85730 THROMBOPLASTIN TIME PARTIAL: CPT

## 2020-09-30 PROCEDURE — 36415 COLL VENOUS BLD VENIPUNCTURE: CPT

## 2020-09-30 PROCEDURE — 85025 COMPLETE CBC W/AUTO DIFF WBC: CPT

## 2020-09-30 NOTE — ED PROVIDER NOTE - CARE PROVIDER_API CALL
Joel Martell  INTERNAL MEDICINE  [address], [address]  [place] [address]  Phone: [phone]  Fax: [phone]  Follow Up Time:

## 2020-09-30 NOTE — ED ADULT TRIAGE NOTE - CHIEF COMPLAINT QUOTE
Patient complaining of left sided numbness x1hr, MD Casarez called to triage to evaluate patient. VS stable, no distress noted.

## 2020-09-30 NOTE — ED ADULT TRIAGE NOTE - ADDITIONAL SAFETY/BANDS...
Pt advised and dismissed in no acute distress. Pt verbalized understanding of d/c instructions and follow up.
Additional Safety/Bands:

## 2020-09-30 NOTE — ED PROVIDER NOTE - NEUROLOGICAL, MLM
Alert and oriented, no focal deficits, no motor or sensory deficits.  pt states left side "feels lighter"

## 2020-09-30 NOTE — ED PROVIDER NOTE - PMH
H/O bronchitis  COVID+ march 2020  HLD (hyperlipidemia)    HTN (hypertension)    Nephrolithiasis

## 2020-09-30 NOTE — ED ADULT NURSE NOTE - OBJECTIVE STATEMENT
Patient presents to the ED with left sided numbness beginning at 2200. States numbness is on the whole left side of his body. Denies pain. Patient A+Ox4. Speech coherent and not slurred. No neuro deficits. Patient able to walk steadily on his own without assistance. States he takes anticoagulants daily. Patient placed on cardiac monitor.

## 2020-09-30 NOTE — ED PROVIDER NOTE - NSFOLLOWUPINSTRUCTIONS_ED_ALL_ED_FT
you are signing out against medical advice (AMA).  you understand the risks of AMA including but not limited to death,.   please follow up with your doctor today as scheduled.   continue with your medications as prescribed by your doctor.   return to ED for any concerns

## 2020-09-30 NOTE — ED PROVIDER NOTE - PATIENT PORTAL LINK FT
You can access the FollowMyHealth Patient Portal offered by [facility] by registering at the following website: [email or web address]. By joining [email or web address]’s FollowMyHealth portal, you will also be able to view your health information using other applications (apps) compatible with our system.

## 2020-09-30 NOTE — ED PROVIDER NOTE - OBJECTIVE STATEMENT
68 y/o male in ED c/o left side "feeling lighter" than his right side x 1 hr.   pt states h/o TIA in the past.   pt states h/o AF on Xarelto and Plavix.   pt denies any fever, HA, change in vision, neck pain , cp, sob, n/v/d/abd pain.  tolerating PO.   no sick contacts or recent travel

## 2020-09-30 NOTE — ED PROVIDER NOTE - PROGRESS NOTE DETAILS
case d/w Dr Cabrales ([phone]) from [facility] and will transfer pt to [facility] in the morning. pt told of results and plan for transfer in the AM.   pt states feels better and prefers to leave AMA and will f/u with Augustine in office.   pt requesting medication for anxiety

## 2020-09-30 NOTE — ED PROVIDER NOTE - CLINICAL SUMMARY MEDICAL DECISION MAKING FREE TEXT BOX
pt with left sided numbness.   stroke scale of ).   on Xarelto and Plavix.  no code stroke.   will w/u and admit

## 2020-09-30 NOTE — ED ADULT NURSE NOTE - NSIMPLEMENTINTERV_GEN_ALL_ED
Implemented All Universal Safety Interventions:  [place] to call system. Call bell, personal items and telephone within reach. Instruct patient to call for assistance. Room bathroom lighting operational. Non-slip footwear when patient is off stretcher. Physically safe environment: no spills, clutter or unnecessary equipment. Stretcher in lowest position, wheels locked, appropriate side rails in place.

## 2020-09-30 NOTE — ED PROVIDER NOTE - CHPI ED SYMPTOMS NEG
no loss of consciousness/no vomiting/no weakness/no change in level of consciousness/no blurred vision/no fever

## 2020-10-01 VITALS
OXYGEN SATURATION: 97 % | SYSTOLIC BLOOD PRESSURE: 158 MMHG | TEMPERATURE: 98 F | DIASTOLIC BLOOD PRESSURE: 62 MMHG | RESPIRATION RATE: 16 BRPM | HEART RATE: 60 BPM

## 2020-10-01 LAB
ALBUMIN SERPL ELPH-MCNC: 3.8 G/DL — SIGNIFICANT CHANGE UP (ref 3.3–5)
ALP SERPL-CCNC: 119 U/L — SIGNIFICANT CHANGE UP (ref 40–120)
ALT FLD-CCNC: 24 U/L — SIGNIFICANT CHANGE UP (ref 12–78)
ANION GAP SERPL CALC-SCNC: 3 MMOL/L — LOW (ref 5–17)
APPEARANCE UR: CLEAR — SIGNIFICANT CHANGE UP
APTT BLD: 48.2 SEC — HIGH (ref 27.5–35.5)
AST SERPL-CCNC: 16 U/L — SIGNIFICANT CHANGE UP (ref 15–37)
BASOPHILS # BLD AUTO: 0.08 K/UL — SIGNIFICANT CHANGE UP (ref 0–0.2)
BASOPHILS NFR BLD AUTO: 1.2 % — SIGNIFICANT CHANGE UP (ref 0–2)
BILIRUB SERPL-MCNC: 0.4 MG/DL — SIGNIFICANT CHANGE UP (ref 0.2–1.2)
BILIRUB UR-MCNC: NEGATIVE — SIGNIFICANT CHANGE UP
BUN SERPL-MCNC: 17 MG/DL — SIGNIFICANT CHANGE UP (ref 7–23)
CALCIUM SERPL-MCNC: 9.8 MG/DL — SIGNIFICANT CHANGE UP (ref 8.5–10.1)
CHLORIDE SERPL-SCNC: 105 MMOL/L — SIGNIFICANT CHANGE UP (ref 96–108)
CO2 SERPL-SCNC: 30 MMOL/L — SIGNIFICANT CHANGE UP (ref 22–31)
COLOR SPEC: YELLOW — SIGNIFICANT CHANGE UP
CREAT SERPL-MCNC: 1.06 MG/DL — SIGNIFICANT CHANGE UP (ref 0.5–1.3)
DIFF PNL FLD: NEGATIVE — SIGNIFICANT CHANGE UP
EOSINOPHIL # BLD AUTO: 0.41 K/UL — SIGNIFICANT CHANGE UP (ref 0–0.5)
EOSINOPHIL NFR BLD AUTO: 6.1 % — HIGH (ref 0–6)
GLUCOSE SERPL-MCNC: 101 MG/DL — HIGH (ref 70–99)
GLUCOSE UR QL: NEGATIVE MG/DL — SIGNIFICANT CHANGE UP
HCT VFR BLD CALC: 44.3 % — SIGNIFICANT CHANGE UP (ref 39–50)
HGB BLD-MCNC: 13.8 G/DL — SIGNIFICANT CHANGE UP (ref 13–17)
IMM GRANULOCYTES NFR BLD AUTO: 0.1 % — SIGNIFICANT CHANGE UP (ref 0–1.5)
INR BLD: 1.21 RATIO — HIGH (ref 0.88–1.16)
KETONES UR-MCNC: NEGATIVE — SIGNIFICANT CHANGE UP
LEUKOCYTE ESTERASE UR-ACNC: NEGATIVE — SIGNIFICANT CHANGE UP
LYMPHOCYTES # BLD AUTO: 1.86 K/UL — SIGNIFICANT CHANGE UP (ref 1–3.3)
LYMPHOCYTES # BLD AUTO: 27.5 % — SIGNIFICANT CHANGE UP (ref 13–44)
MCHC RBC-ENTMCNC: 28.9 PG — SIGNIFICANT CHANGE UP (ref 27–34)
MCHC RBC-ENTMCNC: 31.2 GM/DL — LOW (ref 32–36)
MCV RBC AUTO: 92.9 FL — SIGNIFICANT CHANGE UP (ref 80–100)
MONOCYTES # BLD AUTO: 1.13 K/UL — HIGH (ref 0–0.9)
MONOCYTES NFR BLD AUTO: 16.7 % — HIGH (ref 2–14)
NEUTROPHILS # BLD AUTO: 3.28 K/UL — SIGNIFICANT CHANGE UP (ref 1.8–7.4)
NEUTROPHILS NFR BLD AUTO: 48.4 % — SIGNIFICANT CHANGE UP (ref 43–77)
NITRITE UR-MCNC: NEGATIVE — SIGNIFICANT CHANGE UP
PH UR: 5 — SIGNIFICANT CHANGE UP (ref 5–8)
PLATELET # BLD AUTO: 199 K/UL — SIGNIFICANT CHANGE UP (ref 150–400)
POTASSIUM SERPL-MCNC: 4.6 MMOL/L — SIGNIFICANT CHANGE UP (ref 3.5–5.3)
POTASSIUM SERPL-SCNC: 4.6 MMOL/L — SIGNIFICANT CHANGE UP (ref 3.5–5.3)
PROT SERPL-MCNC: 7.6 GM/DL — SIGNIFICANT CHANGE UP (ref 6–8.3)
PROT UR-MCNC: NEGATIVE MG/DL — SIGNIFICANT CHANGE UP
PROTHROM AB SERPL-ACNC: 14 SEC — HIGH (ref 10.6–13.6)
RBC # BLD: 4.77 M/UL — SIGNIFICANT CHANGE UP (ref 4.2–5.8)
RBC # FLD: 13.7 % — SIGNIFICANT CHANGE UP (ref 10.3–14.5)
SODIUM SERPL-SCNC: 138 MMOL/L — SIGNIFICANT CHANGE UP (ref 135–145)
SP GR SPEC: 1.02 — SIGNIFICANT CHANGE UP (ref 1.01–1.02)
TROPONIN I SERPL-MCNC: <0.015 NG/ML — SIGNIFICANT CHANGE UP (ref 0.01–0.04)
UROBILINOGEN FLD QL: NEGATIVE MG/DL — SIGNIFICANT CHANGE UP
WBC # BLD: 6.77 K/UL — SIGNIFICANT CHANGE UP (ref 3.8–10.5)
WBC # FLD AUTO: 6.77 K/UL — SIGNIFICANT CHANGE UP (ref 3.8–10.5)

## 2020-10-01 PROCEDURE — 93010 ELECTROCARDIOGRAM REPORT: CPT

## 2020-10-01 RX ORDER — ALPRAZOLAM 0.25 MG
0.5 TABLET ORAL ONCE
Refills: 0 | Status: DISCONTINUED | OUTPATIENT
Start: 2020-10-01 | End: 2020-10-01

## 2020-10-01 RX ADMIN — Medication 0.5 MILLIGRAM(S): at 01:09

## 2020-10-05 ENCOUNTER — RX CHANGE (OUTPATIENT)
Age: 70
End: 2020-10-05

## 2020-10-12 ENCOUNTER — RX RENEWAL (OUTPATIENT)
Age: 70
End: 2020-10-12

## 2020-11-06 ENCOUNTER — RX CHANGE (OUTPATIENT)
Age: 70
End: 2020-11-06

## 2020-11-23 ENCOUNTER — RX RENEWAL (OUTPATIENT)
Age: 70
End: 2020-11-23

## 2020-12-15 ENCOUNTER — RX CHANGE (OUTPATIENT)
Age: 70
End: 2020-12-15

## 2020-12-18 ENCOUNTER — RX RENEWAL (OUTPATIENT)
Age: 70
End: 2020-12-18

## 2020-12-18 RX ORDER — TAMSULOSIN HYDROCHLORIDE 0.4 MG/1
0.4 CAPSULE ORAL
Qty: 30 | Refills: 0 | Status: ACTIVE | COMMUNITY
Start: 2019-08-15 | End: 1900-01-01

## 2020-12-28 ENCOUNTER — RX RENEWAL (OUTPATIENT)
Age: 70
End: 2020-12-28

## 2020-12-28 RX ORDER — BETAMETHASONE DIPROPIONATE 0.5 MG/G
0.05 OINTMENT TOPICAL TWICE DAILY
Qty: 90 | Refills: 3 | Status: ACTIVE | COMMUNITY
Start: 2020-07-10 | End: 1900-01-01

## 2021-01-11 ENCOUNTER — RX CHANGE (OUTPATIENT)
Age: 71
End: 2021-01-11

## 2021-01-12 ENCOUNTER — RX RENEWAL (OUTPATIENT)
Age: 71
End: 2021-01-12

## 2021-03-31 NOTE — ED STATDOCS - CROS ED ROS STATEMENT
Please return to the ER if you have worsening shortness of breath, chest pain, or any other worsening/concerning symptoms.  
all other ROS negative except as per HPI

## 2021-04-20 ENCOUNTER — APPOINTMENT (OUTPATIENT)
Dept: UROLOGY | Facility: CLINIC | Age: 71
End: 2021-04-20

## 2021-04-28 ENCOUNTER — RX RENEWAL (OUTPATIENT)
Age: 71
End: 2021-04-28

## 2021-06-03 ENCOUNTER — APPOINTMENT (OUTPATIENT)
Dept: UROLOGY | Facility: CLINIC | Age: 71
End: 2021-06-03
Payer: COMMERCIAL

## 2021-06-03 VITALS
SYSTOLIC BLOOD PRESSURE: 146 MMHG | DIASTOLIC BLOOD PRESSURE: 83 MMHG | TEMPERATURE: 98 F | RESPIRATION RATE: 17 BRPM | HEART RATE: 59 BPM

## 2021-06-03 DIAGNOSIS — N50.819 TESTICULAR PAIN, UNSPECIFIED: ICD-10-CM

## 2021-06-03 DIAGNOSIS — N52.9 MALE ERECTILE DYSFUNCTION, UNSPECIFIED: ICD-10-CM

## 2021-06-03 PROCEDURE — 99215 OFFICE O/P EST HI 40 MIN: CPT

## 2021-06-03 PROCEDURE — 99072 ADDL SUPL MATRL&STAF TM PHE: CPT

## 2021-06-03 RX ORDER — PAPAVERINE HYDROCHLORIDE 30 MG/ML
30 INJECTION, SOLUTION INTRAVENOUS
Qty: 2 | Refills: 0 | Status: ACTIVE | COMMUNITY
Start: 2021-06-03 | End: 1900-01-01

## 2021-06-03 NOTE — HISTORY OF PRESENT ILLNESS
[FreeTextEntry1] : Patient is a 70-year-old gentleman who presents with the chief complaint of erectile dysfunction  for evaluation. I reviewed the questionnaire he had completed with him in detail. He states his erections are 3/10 in tunescence and rigidity. They are slightly better with oral sex. He is unable to ejaculate  The patient denies fevers, chills, nausea and/or vomiting and no unexplained weight loss. He has no known drug allergies.  His past medical history demonstrates no significant urologic issues.  In his present occupation as a he has no known toxin exposure.  He does smoke and drinks only socially.  He has no known drug allergies.  His review of systems is non-contributory. His family history is not significant.

## 2021-06-03 NOTE — PHYSICAL EXAM
[General Appearance - Well Developed] : well developed [General Appearance - Well Nourished] : well nourished [Normal Appearance] : normal appearance [Well Groomed] : well groomed [General Appearance - In No Acute Distress] : no acute distress [Arterial Pulses Normal] : the pedal pulses were normal [Heart Rate And Rhythm] : Heart rate and rhythm were normal [Edema] : no peripheral edema [Respiration, Rhythm And Depth] : normal respiratory rhythm and effort [Exaggerated Use Of Accessory Muscles For Inspiration] : no accessory muscle use [Auscultation Breath Sounds / Voice Sounds] : lungs were clear to auscultation bilaterally [Chest Palpation] : palpation of the chest revealed no abnormalities [Lungs Percussion] : the lungs were normal to percussion [Bowel Sounds] : normal bowel sounds [Abdomen Soft] : soft [Abdomen Tenderness] : non-tender [Abdomen Mass (___ Cm)] : no abdominal mass palpated [Costovertebral Angle Tenderness] : no ~M costovertebral angle tenderness [Abdomen Hernia] : no hernia was discovered [Urethral Meatus] : meatus normal [Urinary Bladder Findings] : the bladder was normal on palpation [Scrotum] : the scrotum was normal [Epididymis] : the epididymides were normal [Testes Tenderness] : no tenderness of the testes [Testes Mass (___cm)] : there were no testicular masses [Anus Abnormality] : the anus and perineum were normal [Prostate Enlargement] : the prostate was not enlarged [Rectal Exam - Rectum] : digital rectal exam was normal [Prostate Tenderness] : the prostate was not tender [No Prostate Nodules] : no prostate nodules [Normal Station and Gait] : the gait and station were normal for the patient's age [Skin Color & Pigmentation] : normal skin color and pigmentation [Skin Turgor] : supple [] : no rash [Skin Lesions] : no skin lesions [No Focal Deficits] : no focal deficits [Sensation] : the sensory exam was normal to light touch and pinprick [Motor Exam] : the motor exam was normal [Oriented To Time, Place, And Person] : oriented to person, place, and time [Affect] : the affect was normal [Mood] : the mood was normal [Not Anxious] : not anxious [No Palpable Adenopathy] : no palpable adenopathy [Cervical Lymph Nodes Enlarged Posterior Bilaterally] : posterior cervical [Cervical Lymph Nodes Enlarged Anterior Bilaterally] : anterior cervical [Supraclavicular Lymph Nodes Enlarged Bilaterally] : supraclavicular [Axillary Lymph Nodes Enlarged Bilaterally] : axillary [Inguinal Lymph Nodes Enlarged Bilaterally] : inguinal [Femoral Lymph Nodes Enlarged Bilaterally] : femoral [Penis Abnormality] : normal circumcised penis

## 2021-06-03 NOTE — ASSESSMENT
[FreeTextEntry1] : This pleasant gentleman presents with concerns regarding testicular discomfort.  I have requested several baseline blood studies and a penile duplex ultrasound.  Urine analysis was also requested.  I will make more specific recommendations after the results of the requested tests return.\par \par Consultation: 40 minutes:  20 minutes reviewing his history and performing a physical examination.  20 minutes reviewing the ultrasound, writing for prescription medications and blood studies ,discussing treatment options and writing his note. There was also additional time in preparation for today's visit.\par

## 2021-06-04 NOTE — INPATIENT CERTIFICATION FOR MEDICARE PATIENTS - THE STATUS OF COMORBIDITIES.
(1) please add onto my schedule 6/14/2021 in the afternoon and give Adrienne a call.
2. The status of comorbities. (See ED/admit documents)

## 2021-06-06 LAB
25(OH)D3 SERPL-MCNC: 57.9 NG/ML
ALBUMIN SERPL ELPH-MCNC: 4.8 G/DL
ALP BLD-CCNC: 109 U/L
ALT SERPL-CCNC: 13 U/L
ANION GAP SERPL CALC-SCNC: 16 MMOL/L
AST SERPL-CCNC: 17 U/L
BASOPHILS # BLD AUTO: 0.07 K/UL
BASOPHILS NFR BLD AUTO: 0.9 %
BILIRUB SERPL-MCNC: 0.4 MG/DL
BUN SERPL-MCNC: 18 MG/DL
CALCIUM SERPL-MCNC: 10 MG/DL
CHLORIDE SERPL-SCNC: 101 MMOL/L
CHOLEST SERPL-MCNC: 113 MG/DL
CO2 SERPL-SCNC: 24 MMOL/L
COVID-19 NUCLEOCAPSID  GAM ANTIBODY INTERPRETATION: POSITIVE
CREAT SERPL-MCNC: 1.04 MG/DL
EOSINOPHIL # BLD AUTO: 0.23 K/UL
EOSINOPHIL NFR BLD AUTO: 2.9 %
ESTIMATED AVERAGE GLUCOSE: 114 MG/DL
GLUCOSE SERPL-MCNC: 100 MG/DL
HBA1C MFR BLD HPLC: 5.6 %
HCT VFR BLD CALC: 44.9 %
HDLC SERPL-MCNC: 39 MG/DL
HGB BLD-MCNC: 14.1 G/DL
IMM GRANULOCYTES NFR BLD AUTO: 0.1 %
LDLC SERPL CALC-MCNC: 56 MG/DL
LH SERPL-ACNC: 4.5 IU/L
LYMPHOCYTES # BLD AUTO: 1.91 K/UL
LYMPHOCYTES NFR BLD AUTO: 23.8 %
MAN DIFF?: NORMAL
MCHC RBC-ENTMCNC: 29.7 PG
MCHC RBC-ENTMCNC: 31.4 GM/DL
MCV RBC AUTO: 94.5 FL
MONOCYTES # BLD AUTO: 0.92 K/UL
MONOCYTES NFR BLD AUTO: 11.5 %
NEUTROPHILS # BLD AUTO: 4.88 K/UL
NEUTROPHILS NFR BLD AUTO: 60.8 %
NONHDLC SERPL-MCNC: 74 MG/DL
PLATELET # BLD AUTO: 232 K/UL
POTASSIUM SERPL-SCNC: 4.9 MMOL/L
PROLACTIN SERPL-MCNC: 7.4 NG/ML
PROT SERPL-MCNC: 7.8 G/DL
PSA SERPL-MCNC: 0.88 NG/ML
RBC # BLD: 4.75 M/UL
RBC # FLD: 13.8 %
SARS-COV-2 AB SERPL QL IA: 59.8 INDEX
SODIUM SERPL-SCNC: 140 MMOL/L
TRIGL SERPL-MCNC: 92 MG/DL
WBC # FLD AUTO: 8.02 K/UL

## 2021-06-07 LAB
TESTOST BND SERPL-MCNC: 5 PG/ML
TESTOST SERPL-MCNC: 336.8 NG/DL

## 2021-06-24 ENCOUNTER — APPOINTMENT (OUTPATIENT)
Dept: UROLOGY | Facility: CLINIC | Age: 71
End: 2021-06-24

## 2021-11-01 ENCOUNTER — TRANSCRIPTION ENCOUNTER (OUTPATIENT)
Age: 71
End: 2021-11-01

## 2021-11-04 ENCOUNTER — RESULT REVIEW (OUTPATIENT)
Age: 71
End: 2021-11-04

## 2021-11-04 ENCOUNTER — OUTPATIENT (OUTPATIENT)
Dept: OUTPATIENT SERVICES | Facility: HOSPITAL | Age: 71
LOS: 1 days | Discharge: HOME | End: 2021-11-04
Payer: COMMERCIAL

## 2021-11-04 VITALS
WEIGHT: 179.9 LBS | SYSTOLIC BLOOD PRESSURE: 152 MMHG | HEIGHT: 71 IN | DIASTOLIC BLOOD PRESSURE: 79 MMHG | TEMPERATURE: 98 F | OXYGEN SATURATION: 100 % | RESPIRATION RATE: 28 BRPM | HEART RATE: 75 BPM

## 2021-11-04 DIAGNOSIS — Z98.890 OTHER SPECIFIED POSTPROCEDURAL STATES: Chronic | ICD-10-CM

## 2021-11-04 DIAGNOSIS — D50.0 IRON DEFICIENCY ANEMIA SECONDARY TO BLOOD LOSS (CHRONIC): ICD-10-CM

## 2021-11-04 DIAGNOSIS — M54.2 CERVICALGIA: Chronic | ICD-10-CM

## 2021-11-04 PROCEDURE — 88305 TISSUE EXAM BY PATHOLOGIST: CPT

## 2021-11-04 PROCEDURE — 93010 ELECTROCARDIOGRAM REPORT: CPT

## 2021-11-04 PROCEDURE — 93005 ELECTROCARDIOGRAM TRACING: CPT

## 2021-11-04 PROCEDURE — 88305 TISSUE EXAM BY PATHOLOGIST: CPT | Mod: 26

## 2021-11-04 NOTE — ASU PATIENT PROFILE, ADULT - NSICDXPASTMEDICALHX_GEN_ALL_CORE_FT
PAST MEDICAL HISTORY:  Dark stools     H/O bronchitis COVID+ march 2020    HLD (hyperlipidemia)     HTN (hypertension)     Nephrolithiasis

## 2021-11-04 NOTE — ASU PATIENT PROFILE, ADULT - NSICDXPASTSURGICALHX_GEN_ALL_CORE_FT
PAST SURGICAL HISTORY:  H/O cardiac radiofrequency ablation     H/O endoscopy     H/O inguinal hernia repair     Status post cardiac surgery      PAST SURGICAL HISTORY:  Chronic neck pain with history of cervical spinal surgery     H/O cardiac radiofrequency ablation     H/O endoscopy     H/O inguinal hernia repair     Status post cardiac surgery

## 2021-11-11 DIAGNOSIS — Z95.5 PRESENCE OF CORONARY ANGIOPLASTY IMPLANT AND GRAFT: ICD-10-CM

## 2021-11-11 DIAGNOSIS — K63.5 POLYP OF COLON: ICD-10-CM

## 2021-11-11 DIAGNOSIS — Z86.010 PERSONAL HISTORY OF COLONIC POLYPS: ICD-10-CM

## 2021-11-11 DIAGNOSIS — K92.2 GASTROINTESTINAL HEMORRHAGE, UNSPECIFIED: ICD-10-CM

## 2021-11-11 DIAGNOSIS — D64.9 ANEMIA, UNSPECIFIED: ICD-10-CM

## 2021-11-11 DIAGNOSIS — Z79.01 LONG TERM (CURRENT) USE OF ANTICOAGULANTS: ICD-10-CM

## 2021-11-11 DIAGNOSIS — Z87.891 PERSONAL HISTORY OF NICOTINE DEPENDENCE: ICD-10-CM

## 2021-11-11 DIAGNOSIS — Z88.0 ALLERGY STATUS TO PENICILLIN: ICD-10-CM

## 2021-11-11 DIAGNOSIS — I25.10 ATHEROSCLEROTIC HEART DISEASE OF NATIVE CORONARY ARTERY WITHOUT ANGINA PECTORIS: ICD-10-CM

## 2021-11-11 DIAGNOSIS — I10 ESSENTIAL (PRIMARY) HYPERTENSION: ICD-10-CM

## 2021-11-11 DIAGNOSIS — Z79.82 LONG TERM (CURRENT) USE OF ASPIRIN: ICD-10-CM

## 2021-11-25 NOTE — ED ADULT TRIAGE NOTE - AS HEIGHT TYPE
Pt transferred from the PACU to room 701. Pt alert and oriented, does not c/o pain, ordered bed rest.  PT has tran, chronic ostomy, wound vac and JOSE. Pt slid to bed. 4 eyes complete with Margarito WALTER. Pt oriented to call light and IS use.    stated

## 2021-11-27 ENCOUNTER — INPATIENT (INPATIENT)
Facility: HOSPITAL | Age: 71
LOS: 3 days | Discharge: HOME | DRG: 378 | End: 2021-12-01
Attending: HOSPITALIST | Admitting: FAMILY MEDICINE
Payer: COMMERCIAL

## 2021-11-27 VITALS
HEART RATE: 78 BPM | OXYGEN SATURATION: 100 % | TEMPERATURE: 98 F | HEIGHT: 71 IN | RESPIRATION RATE: 17 BRPM | DIASTOLIC BLOOD PRESSURE: 56 MMHG | SYSTOLIC BLOOD PRESSURE: 133 MMHG | WEIGHT: 179.9 LBS

## 2021-11-27 DIAGNOSIS — M54.2 CERVICALGIA: Chronic | ICD-10-CM

## 2021-11-27 DIAGNOSIS — Z98.890 OTHER SPECIFIED POSTPROCEDURAL STATES: Chronic | ICD-10-CM

## 2021-11-27 DIAGNOSIS — Z95.5 PRESENCE OF CORONARY ANGIOPLASTY IMPLANT AND GRAFT: Chronic | ICD-10-CM

## 2021-11-27 DIAGNOSIS — K92.2 GASTROINTESTINAL HEMORRHAGE, UNSPECIFIED: ICD-10-CM

## 2021-11-27 PROBLEM — R19.5 OTHER FECAL ABNORMALITIES: Chronic | Status: ACTIVE | Noted: 2021-11-04

## 2021-11-27 LAB
ALBUMIN SERPL ELPH-MCNC: 2.8 G/DL — LOW (ref 3.3–5)
ALP SERPL-CCNC: 105 U/L — SIGNIFICANT CHANGE UP (ref 40–120)
ALT FLD-CCNC: 17 U/L — SIGNIFICANT CHANGE UP (ref 12–78)
ANION GAP SERPL CALC-SCNC: 6 MMOL/L — SIGNIFICANT CHANGE UP (ref 5–17)
APPEARANCE UR: CLEAR — SIGNIFICANT CHANGE UP
APTT BLD: 44.3 SEC — HIGH (ref 27.5–35.5)
AST SERPL-CCNC: 11 U/L — LOW (ref 15–37)
BASOPHILS # BLD AUTO: 0.05 K/UL — SIGNIFICANT CHANGE UP (ref 0–0.2)
BASOPHILS NFR BLD AUTO: 0.5 % — SIGNIFICANT CHANGE UP (ref 0–2)
BILIRUB SERPL-MCNC: 0.3 MG/DL — SIGNIFICANT CHANGE UP (ref 0.2–1.2)
BILIRUB UR-MCNC: NEGATIVE — SIGNIFICANT CHANGE UP
BLD GP AB SCN SERPL QL: SIGNIFICANT CHANGE UP
BUN SERPL-MCNC: 26 MG/DL — HIGH (ref 7–23)
CALCIUM SERPL-MCNC: 8.5 MG/DL — SIGNIFICANT CHANGE UP (ref 8.5–10.1)
CHLORIDE SERPL-SCNC: 103 MMOL/L — SIGNIFICANT CHANGE UP (ref 96–108)
CO2 SERPL-SCNC: 26 MMOL/L — SIGNIFICANT CHANGE UP (ref 22–31)
COLOR SPEC: SIGNIFICANT CHANGE UP
CREAT SERPL-MCNC: 1.07 MG/DL — SIGNIFICANT CHANGE UP (ref 0.5–1.3)
DIFF PNL FLD: NEGATIVE — SIGNIFICANT CHANGE UP
EOSINOPHIL # BLD AUTO: 0.12 K/UL — SIGNIFICANT CHANGE UP (ref 0–0.5)
EOSINOPHIL NFR BLD AUTO: 1.3 % — SIGNIFICANT CHANGE UP (ref 0–6)
GLUCOSE SERPL-MCNC: 138 MG/DL — HIGH (ref 70–99)
GLUCOSE UR QL: NEGATIVE MG/DL — SIGNIFICANT CHANGE UP
HCT VFR BLD CALC: 19.4 % — CRITICAL LOW (ref 39–50)
HCT VFR BLD CALC: 22.2 % — LOW (ref 39–50)
HGB BLD-MCNC: 6 G/DL — CRITICAL LOW (ref 13–17)
HGB BLD-MCNC: 6.8 G/DL — CRITICAL LOW (ref 13–17)
IMM GRANULOCYTES NFR BLD AUTO: 0.4 % — SIGNIFICANT CHANGE UP (ref 0–1.5)
INR BLD: 1.84 RATIO — HIGH (ref 0.88–1.16)
KETONES UR-MCNC: NEGATIVE — SIGNIFICANT CHANGE UP
LEUKOCYTE ESTERASE UR-ACNC: NEGATIVE — SIGNIFICANT CHANGE UP
LYMPHOCYTES # BLD AUTO: 1.31 K/UL — SIGNIFICANT CHANGE UP (ref 1–3.3)
LYMPHOCYTES # BLD AUTO: 13.7 % — SIGNIFICANT CHANGE UP (ref 13–44)
MCHC RBC-ENTMCNC: 28.7 PG — SIGNIFICANT CHANGE UP (ref 27–34)
MCHC RBC-ENTMCNC: 30.9 GM/DL — LOW (ref 32–36)
MCV RBC AUTO: 92.8 FL — SIGNIFICANT CHANGE UP (ref 80–100)
MONOCYTES # BLD AUTO: 0.92 K/UL — HIGH (ref 0–0.9)
MONOCYTES NFR BLD AUTO: 9.6 % — SIGNIFICANT CHANGE UP (ref 2–14)
NEUTROPHILS # BLD AUTO: 7.1 K/UL — SIGNIFICANT CHANGE UP (ref 1.8–7.4)
NEUTROPHILS NFR BLD AUTO: 74.5 % — SIGNIFICANT CHANGE UP (ref 43–77)
NITRITE UR-MCNC: NEGATIVE — SIGNIFICANT CHANGE UP
NT-PROBNP SERPL-SCNC: 136 PG/ML — HIGH (ref 0–125)
PH UR: 7 — SIGNIFICANT CHANGE UP (ref 5–8)
PLATELET # BLD AUTO: 306 K/UL — SIGNIFICANT CHANGE UP (ref 150–400)
POTASSIUM SERPL-MCNC: 4 MMOL/L — SIGNIFICANT CHANGE UP (ref 3.5–5.3)
POTASSIUM SERPL-SCNC: 4 MMOL/L — SIGNIFICANT CHANGE UP (ref 3.5–5.3)
PROT SERPL-MCNC: 6.4 GM/DL — SIGNIFICANT CHANGE UP (ref 6–8.3)
PROT UR-MCNC: NEGATIVE MG/DL — SIGNIFICANT CHANGE UP
PROTHROM AB SERPL-ACNC: 20.7 SEC — HIGH (ref 10.6–13.6)
RBC # BLD: 2.09 M/UL — LOW (ref 4.2–5.8)
RBC # FLD: 15.7 % — HIGH (ref 10.3–14.5)
SARS-COV-2 RNA SPEC QL NAA+PROBE: SIGNIFICANT CHANGE UP
SODIUM SERPL-SCNC: 135 MMOL/L — SIGNIFICANT CHANGE UP (ref 135–145)
SP GR SPEC: 1.01 — SIGNIFICANT CHANGE UP (ref 1.01–1.02)
TROPONIN I, HIGH SENSITIVITY RESULT: 14.04 NG/L — SIGNIFICANT CHANGE UP
UROBILINOGEN FLD QL: NEGATIVE MG/DL — SIGNIFICANT CHANGE UP
WBC # BLD: 9.54 K/UL — SIGNIFICANT CHANGE UP (ref 3.8–10.5)
WBC # FLD AUTO: 9.54 K/UL — SIGNIFICANT CHANGE UP (ref 3.8–10.5)

## 2021-11-27 PROCEDURE — 86920 COMPATIBILITY TEST SPIN: CPT

## 2021-11-27 PROCEDURE — 36430 TRANSFUSION BLD/BLD COMPNT: CPT

## 2021-11-27 PROCEDURE — 94640 AIRWAY INHALATION TREATMENT: CPT

## 2021-11-27 PROCEDURE — C9113: CPT

## 2021-11-27 PROCEDURE — 88305 TISSUE EXAM BY PATHOLOGIST: CPT

## 2021-11-27 PROCEDURE — 83735 ASSAY OF MAGNESIUM: CPT

## 2021-11-27 PROCEDURE — 71045 X-RAY EXAM CHEST 1 VIEW: CPT | Mod: 26

## 2021-11-27 PROCEDURE — 86769 SARS-COV-2 COVID-19 ANTIBODY: CPT

## 2021-11-27 PROCEDURE — 74177 CT ABD & PELVIS W/CONTRAST: CPT

## 2021-11-27 PROCEDURE — 85018 HEMOGLOBIN: CPT

## 2021-11-27 PROCEDURE — 80076 HEPATIC FUNCTION PANEL: CPT

## 2021-11-27 PROCEDURE — 80048 BASIC METABOLIC PNL TOTAL CA: CPT

## 2021-11-27 PROCEDURE — 84100 ASSAY OF PHOSPHORUS: CPT

## 2021-11-27 PROCEDURE — 87086 URINE CULTURE/COLONY COUNT: CPT

## 2021-11-27 PROCEDURE — P9040: CPT

## 2021-11-27 PROCEDURE — 93010 ELECTROCARDIOGRAM REPORT: CPT | Mod: 76

## 2021-11-27 PROCEDURE — 99291 CRITICAL CARE FIRST HOUR: CPT

## 2021-11-27 PROCEDURE — 85027 COMPLETE CBC AUTOMATED: CPT

## 2021-11-27 PROCEDURE — 85025 COMPLETE CBC W/AUTO DIFF WBC: CPT

## 2021-11-27 PROCEDURE — 36415 COLL VENOUS BLD VENIPUNCTURE: CPT

## 2021-11-27 PROCEDURE — 99223 1ST HOSP IP/OBS HIGH 75: CPT

## 2021-11-27 PROCEDURE — 88312 SPECIAL STAINS GROUP 1: CPT

## 2021-11-27 PROCEDURE — 85014 HEMATOCRIT: CPT

## 2021-11-27 RX ORDER — LANOLIN ALCOHOL/MO/W.PET/CERES
3 CREAM (GRAM) TOPICAL AT BEDTIME
Refills: 0 | Status: DISCONTINUED | OUTPATIENT
Start: 2021-11-27 | End: 2021-12-01

## 2021-11-27 RX ORDER — PANTOPRAZOLE SODIUM 20 MG/1
40 TABLET, DELAYED RELEASE ORAL ONCE
Refills: 0 | Status: COMPLETED | OUTPATIENT
Start: 2021-11-27 | End: 2021-11-27

## 2021-11-27 RX ORDER — ZOLPIDEM TARTRATE 10 MG/1
5 TABLET ORAL AT BEDTIME
Refills: 0 | Status: DISCONTINUED | OUTPATIENT
Start: 2021-11-27 | End: 2021-12-01

## 2021-11-27 RX ORDER — TAMSULOSIN HYDROCHLORIDE 0.4 MG/1
0.4 CAPSULE ORAL AT BEDTIME
Refills: 0 | Status: DISCONTINUED | OUTPATIENT
Start: 2021-11-27 | End: 2021-12-01

## 2021-11-27 RX ORDER — SODIUM CHLORIDE 9 MG/ML
500 INJECTION INTRAMUSCULAR; INTRAVENOUS; SUBCUTANEOUS ONCE
Refills: 0 | Status: COMPLETED | OUTPATIENT
Start: 2021-11-27 | End: 2021-11-27

## 2021-11-27 RX ORDER — ACETAMINOPHEN 500 MG
650 TABLET ORAL EVERY 6 HOURS
Refills: 0 | Status: DISCONTINUED | OUTPATIENT
Start: 2021-11-27 | End: 2021-12-01

## 2021-11-27 RX ORDER — LISINOPRIL 2.5 MG/1
20 TABLET ORAL DAILY
Refills: 0 | Status: DISCONTINUED | OUTPATIENT
Start: 2021-11-27 | End: 2021-11-29

## 2021-11-27 RX ORDER — GEMFIBROZIL 600 MG
600 TABLET ORAL
Refills: 0 | Status: DISCONTINUED | OUTPATIENT
Start: 2021-11-27 | End: 2021-12-01

## 2021-11-27 RX ORDER — DRONEDARONE 400 MG/1
400 TABLET, FILM COATED ORAL
Refills: 0 | Status: DISCONTINUED | OUTPATIENT
Start: 2021-11-27 | End: 2021-12-01

## 2021-11-27 RX ORDER — ATORVASTATIN CALCIUM 80 MG/1
20 TABLET, FILM COATED ORAL AT BEDTIME
Refills: 0 | Status: DISCONTINUED | OUTPATIENT
Start: 2021-11-27 | End: 2021-12-01

## 2021-11-27 RX ORDER — FLUTICASONE PROPIONATE 50 MCG
1 SPRAY, SUSPENSION NASAL
Refills: 0 | Status: DISCONTINUED | OUTPATIENT
Start: 2021-11-27 | End: 2021-12-01

## 2021-11-27 RX ORDER — MONTELUKAST 4 MG/1
10 TABLET, CHEWABLE ORAL AT BEDTIME
Refills: 0 | Status: DISCONTINUED | OUTPATIENT
Start: 2021-11-27 | End: 2021-12-01

## 2021-11-27 RX ORDER — FAMOTIDINE 10 MG/ML
20 INJECTION INTRAVENOUS DAILY
Refills: 0 | Status: DISCONTINUED | OUTPATIENT
Start: 2021-11-27 | End: 2021-12-01

## 2021-11-27 RX ORDER — RIVAROXABAN 15 MG-20MG
20 KIT ORAL
Qty: 0 | Refills: 0 | DISCHARGE

## 2021-11-27 RX ADMIN — ATORVASTATIN CALCIUM 20 MILLIGRAM(S): 80 TABLET, FILM COATED ORAL at 21:21

## 2021-11-27 RX ADMIN — FAMOTIDINE 20 MILLIGRAM(S): 10 INJECTION INTRAVENOUS at 21:21

## 2021-11-27 RX ADMIN — TAMSULOSIN HYDROCHLORIDE 0.4 MILLIGRAM(S): 0.4 CAPSULE ORAL at 21:21

## 2021-11-27 RX ADMIN — MONTELUKAST 10 MILLIGRAM(S): 4 TABLET, CHEWABLE ORAL at 21:21

## 2021-11-27 RX ADMIN — DRONEDARONE 400 MILLIGRAM(S): 400 TABLET, FILM COATED ORAL at 21:21

## 2021-11-27 RX ADMIN — Medication 1 SPRAY(S): at 21:21

## 2021-11-27 RX ADMIN — SODIUM CHLORIDE 500 MILLILITER(S): 9 INJECTION INTRAMUSCULAR; INTRAVENOUS; SUBCUTANEOUS at 13:25

## 2021-11-27 RX ADMIN — ZOLPIDEM TARTRATE 5 MILLIGRAM(S): 10 TABLET ORAL at 22:58

## 2021-11-27 RX ADMIN — SODIUM CHLORIDE 500 MILLILITER(S): 9 INJECTION INTRAMUSCULAR; INTRAVENOUS; SUBCUTANEOUS at 12:24

## 2021-11-27 RX ADMIN — Medication 600 MILLIGRAM(S): at 21:21

## 2021-11-27 RX ADMIN — Medication 1 TABLET(S): at 19:01

## 2021-11-27 RX ADMIN — ZOLPIDEM TARTRATE 5 MILLIGRAM(S): 10 TABLET ORAL at 22:56

## 2021-11-27 RX ADMIN — PANTOPRAZOLE SODIUM 40 MILLIGRAM(S): 20 TABLET, DELAYED RELEASE ORAL at 17:44

## 2021-11-27 NOTE — ED ADULT TRIAGE NOTE - CHIEF COMPLAINT QUOTE
Patient comes in with shortness of breath x1 day and generalized pain and dizziness x3 days. Denies chest pain, fever.

## 2021-11-27 NOTE — ED PROVIDER NOTE - OBJECTIVE STATEMENT
69 y/o M with a PMHx of bronchitis, COVID-19 (03/2021), dark stool s/p endoscopy & colonoscopy which showed bleeding , HTN, inguinal/umbilical hernia s/p repair, nephrolithiasis, s/p cardiac ablation (12/2020), cardiac stent (06/2020) presents to the ED c/o lightheadedness and dizziness x couple of days; worsening today. Pt states he feels like he needs to pass out when he stands up, walks, or breaths deeply. Pt checked his BP this morning and it was low. Also c/o urinary frequency with some burning. Pt also reports intermittent sharp lung pain which is not new; last episode on 11/26/2021.  States he had a stress test 5 months ago which was normal. Cardiologist at [place]. 71 y/o M with a PMHx of bronchitis, COVID-19 (03/2021), dark stool s/p endoscopy & colonoscopy which showed bleeding , HTN, inguinal/umbilical hernia s/p repair, nephrolithiasis, s/p cardiac ablation (12/2020), cardiac stent (06/2020) presents to the ED c/o lightheadedness and dizziness x couple of days; worsening today. Pt states he feels like he is "in a fog" and needs to pass out when he stands up, walks, or breaths deeply. Pt checked his BP this morning and it was low. Also c/o urinary frequency with some burning. Pt also reports intermittent sharp lung pain which is not new; last episode on 11/26/2021.  States he had a stress test 5 months ago which was normal. Denies n/v/f/c/cp/sob. Denies headache, syncope. Denies chest palpitations, abdominal pain. Denies edema. Denies hematuria, BRBPR, tarry stools, diarrhea, constipation. Denies discharge. Cardiologist at [place]. 69 y/o M with a PMHx of bronchitis, COVID-19 (03/2021), dark stool s/p endoscopy & colonoscopy which showed bleeding , HTN, inguinal/umbilical hernia s/p repair, nephrolithiasis, s/p cardiac ablation (12/2020), cardiac stent (06/2020) presents to the ED c/o lightheadedness and dizziness x couple of days; worsening today. Pt states he feels like he is "in a fog" and needs to pass out when he stands up, walks, or breaths deeply. Pt checked his BP this morning and it was low. Also c/o urinary frequency with some burning. Pt also reports intermittent sharp lung pain which is not new; last episode on 11/26/2021.  States he had a stress test 5 months ago which was normal. Denies n/v/f/c/ Denies headache, syncope. Denies chest palpitations, abdominal pain. Denies edema. Denies hematuria, BRBPR,  diarrhea, constipation. Denies discharge. Cardiologist at [place]. Also reports had colonoscopy failry recently that showed "something". is endorsing darker stools.

## 2021-11-27 NOTE — ED PROVIDER NOTE - NSICDXPASTSURGICALHX_GEN_ALL_CORE_FT
PAST SURGICAL HISTORY:  Chronic neck pain with history of cervical spinal surgery     H/O cardiac radiofrequency ablation     H/O endoscopy     H/O heart artery stent     H/O inguinal hernia repair

## 2021-11-27 NOTE — ED PROVIDER NOTE - EMPLOYMENT
This writer received a voice message from ZANDRA duran  [facility] RN Coordinator stating that patient is on the wait list but it will be 3 to 4 weeks before an intake is scheduled.     N/A

## 2021-11-27 NOTE — PATIENT PROFILE ADULT - HARM RISK FACTORS
Problem: Patient Care Overview  Goal: Plan of Care Review  Outcome: Ongoing (interventions implemented as appropriate)  No changes at this time.  Will continue to monitor.            yes

## 2021-11-27 NOTE — ED PROVIDER NOTE - PHYSICAL EXAMINATION
VITALS: Initial triage and subsequent vitals have been reviewed by me.  GEN APPEARANCE: WDWN, alert and cooperative, non-toxic appearing and in NAD  HEAD: Atraumatic, normocephalic   EYES: PERRLa, EOMI, vision grossly intact.   EARS: Gross hearing intact.   NOSE: No nasal discharge, no external evidence of epistaxis.   NECK: Supple  CV: RRR, S1S2, no c/r/m/g. No cyanosis or pallor. Extremities warm, well perfused. Cap refill <2 seconds. No bruits.   LUNGS: CTAB. No wheezing. No rales. No rhonchi. No diminished breath sounds.   ABDOMEN: Soft, NTND. No guarding or rebound. No masses.   MSK/EXT: Spine appears normal, no spine point tenderness. No CVA ttp. Normal muscular development. Pelvis stable. No obvious joint or bony deformity, no peripheral edema.   NEURO: Alert, follows commands. Weight bearing normal. Speech normal. Sensation and motor normal x4 extremities.   SKIN: Normal color for race, warm, dry and intact. No evidence of rash.  PSYCH: Normal mood and affect.

## 2021-11-27 NOTE — H&P ADULT - NSHPOUTPATIENTPROVIDERS_GEN_ALL_CORE
PCP Juan Mtz, Cardiology Dr. Ceron,  and Dr. Cabrales at [facility],  EP Dr. Michaud, GI Dr. Almonte

## 2021-11-27 NOTE — ED PROVIDER NOTE - NSICDXPASTMEDICALHX_GEN_ALL_CORE_FT
PAST MEDICAL HISTORY:  2019 novel coronavirus disease (COVID-19)     Dark stools     H/O bronchitis COVID+ march 2020    HTN (hypertension)     Nephrolithiasis

## 2021-11-27 NOTE — ED PROVIDER NOTE - CLINICAL SUMMARY MEDICAL DECISION MAKING FREE TEXT BOX
Te GOOD: 69 y/o M with a PMHx of bronchitis, COVID-19 (03/2021), dark stool s/p endoscopy & colonoscopy which showed bleeding , HTN, inguinal/umbilical hernia s/p repair, nephrolithiasis, s/p cardiac ablation (12/2020), cardiac stent (06/2020) presents to the ED c/o lightheadedness and dizziness x couple of days; worsening today. Pt states he feels like he is "in a fog" and needs to pass out when he stands up, walks, or breaths deeply. Pt checked his BP this morning and it was low. Also c/o urinary frequency with some burning. Pt also reports intermittent sharp lung pain which is not new; last episode on 11/26/2021.  States he had a stress test 5 months ago which was normal exam vss non  toxic well appearing ddx c/f GIB vs UA vs less c/f acs or pe plan to check labs cxr ua cardiacs meds as needed and reassess.

## 2021-11-27 NOTE — H&P ADULT - ASSESSMENT
69 yo male with Hx. of  CAD, Atrial fib. s/p Ablation, on Xarelto, anemia , developed melena 6 weeks ago, s/p recent EGD, capsule enteroscopy, Colonoscopy on 11/4/21  presented in the ED for dizziness,  for 2 days and worse today. His Bp was low this am. He was planing to have Watchman device placed at [facility] to be able to stop AC for atrial fib. The patient Hg was 6 in the ED and he was started on blood transfusion.  assessment Dx:                       1. GI bleed                        2. Anemia secondary to acute blood loss                       3. Atrial fib. s/p Ablation, on Xarelto                       4. CAD    Plan:    admit to Telemetry                medications: hold AC, and ASA, Transfuse 2 units PRBC, IV Protonix.                VTEP:  Heparin , venodynes               Labs:  cbc,cmp               Radiology: CXray               Cardiac diagnostics:  EKG               Consults: Cardiology and GI.                                      69 yo male with Hx. of  CAD, Atrial fib. s/p Ablation, on Xarelto, anemia , developed melena 6 weeks ago, s/p recent EGD, capsule enteroscopy, Colonoscopy on 11/4/21  presented in the ED for dizziness,  for 2 days and worse today. His Bp was low this am. He was planing to have Watchman device placed at [facility] to be able to stop AC for atrial fib. The patient Hg was 6 in the ED and he was started on blood transfusion.  assessment Dx:                       1. GI bleed                        2. Anemia secondary to acute blood loss                       3. Atrial fib. s/p Ablation, on Xarelto                       4. CAD    Plan:    admit to Telemetry                medications: hold AC, and ASA, Transfuse 2 units PRBC, IV Protonix.                VTEP:  Heparin , venodynes               Labs:  cbc,cmp, monitor H/H,                Radiology: CXray               Cardiac diagnostics:  EKG               Consults: Cardiology and GI.

## 2021-11-27 NOTE — H&P ADULT - HISTORY OF PRESENT ILLNESS
HPI: The patient is a 69 yo male with Hx. of  CAD, Atrial fib. s/p Ablation, on Xarelto, anemia , developed melena 6 weeks ago, s/p recent EGD, capsule enteroscopy, Colonoscopy on 11/4/21  presented in the ED for dizziness,  for 2 days and worse today. His Bp was low this am. He was planing to have Watchman device placed at [facility] to be able to stop AC for atrial fib.    PMHx: CAD, Atrial fib. s/p Ablation, on Xarelto, anemia, HTN, HLD,     PSHx: AV ablasion 12/20,  C5-6 Laminectomh 8/10/21    Family Hx: Father had CAD/ MI, Mother had diabetes.    Social Hx.: not smoking, no alcohol use    ROS:   Eyes: no changes in vision    ENT/Mouth: no changes    Cardiovascular: no chest pain    Respiratory: no SOB    Gastrointestinal: no diarrhea, no nausea, , had dark stool.     Genitourinary: no dysuria    Breast: no pain    Musculoskeletal: no pain    Integumentary: no itching    Neurological: No Headache, no tremor,    Psychiatric: no suicidal ideations    Endocrine: no excessive thirst,     Hematologic/Lymphatic: no swollen glands    Allergic/Immunologic: no itching      Physical Exam: Vital Signs Last 24 Hrs  T(C): 36.5 (27 Nov 2021 18:49), Max: 36.7 (27 Nov 2021 14:32)  T(F): 97.7 (27 Nov 2021 18:49), Max: 98 (27 Nov 2021 14:32)  HR: 69 (27 Nov 2021 18:49) (63 - 78)  BP: 109/87 (27 Nov 2021 18:49) (109/87 - 133/56)  BP(mean): 93 (27 Nov 2021 18:49) (93 - 93)  RR: 18 (27 Nov 2021 18:49) (17 - 18)  SpO2: 100% (27 Nov 2021 18:49) (96% - 100%)        HEENT: PRRL EOMI    MOUTH/TEETH/GUMS: Clear    NECK: no JVD    LUNGS: Clear    HEART: S1,S2 RR    ABDOMEN: soft nontender    EXTREMITIES:  no pedal edema    MUSCULOSKELETAL: no joint swelling     NEURO: no tremor, no focal signs.    SKIN: no rash    : CVA negative,     Lab:                       6.0    9.54  )-----------( 306      ( 27 Nov 2021 12:16 )             19.4       11-27    135  |  103  |  26<H>  ----------------------------<  138<H>  4.0   |  26  |  1.07    Ca    8.5      27 Nov 2021 12:16    TPro  6.4  /  Alb  2.8<L>  /  TBili  0.3  /  DBili  x   /  AST  11<L>  /  ALT  17  /  AlkPhos  105  11-27         HPI: The patient is a 71 yo male with Hx. of  CAD, Atrial fib. s/p Ablation, on Xarelto, anemia , developed melena 6 weeks ago, s/p recent EGD, capsule enteroscopy, Colonoscopy on 11/4/21 no bleeding source found,   presented in the ED for dizziness,  for 2 days and worse today. His Bp was low this am. He was planing to have Watchman device placed at [facility] to be able to stop AC for atrial fib.    PMHx: CAD, Atrial fib. s/p Ablation, on Xarelto, anemia, HTN, HLD,     PSHx: AV ablasion 12/20,  C5-6 Laminectomh 8/10/21    Family Hx: Father had CAD/ MI, Mother had diabetes.    Social Hx.: not smoking, no alcohol use    ROS:   Eyes: no changes in vision    ENT/Mouth: no changes    Cardiovascular: no chest pain    Respiratory: no SOB    Gastrointestinal: no diarrhea, no nausea, , had dark stool.     Genitourinary: no dysuria    Breast: no pain    Musculoskeletal: no pain    Integumentary: no itching    Neurological: No Headache, no tremor,    Psychiatric: no suicidal ideations    Endocrine: no excessive thirst,     Hematologic/Lymphatic: no swollen glands    Allergic/Immunologic: no itching      Physical Exam: Vital Signs Last 24 Hrs  T(C): 36.5 (27 Nov 2021 18:49), Max: 36.7 (27 Nov 2021 14:32)  T(F): 97.7 (27 Nov 2021 18:49), Max: 98 (27 Nov 2021 14:32)  HR: 69 (27 Nov 2021 18:49) (63 - 78)  BP: 109/87 (27 Nov 2021 18:49) (109/87 - 133/56)  BP(mean): 93 (27 Nov 2021 18:49) (93 - 93)  RR: 18 (27 Nov 2021 18:49) (17 - 18)  SpO2: 100% (27 Nov 2021 18:49) (96% - 100%)        HEENT: PRRL EOMI    MOUTH/TEETH/GUMS: Clear    NECK: no JVD    LUNGS: Clear    HEART: S1,S2 RR    ABDOMEN: soft nontender    EXTREMITIES:  no pedal edema    MUSCULOSKELETAL: no joint swelling     NEURO: no tremor, no focal signs.    SKIN: no rash    : CVA negative,     Lab:                       6.0    9.54  )-----------( 306      ( 27 Nov 2021 12:16 )             19.4       11-27    135  |  103  |  26<H>  ----------------------------<  138<H>  4.0   |  26  |  1.07    Ca    8.5      27 Nov 2021 12:16    TPro  6.4  /  Alb  2.8<L>  /  TBili  0.3  /  DBili  x   /  AST  11<L>  /  ALT  17  /  AlkPhos  105  11-27

## 2021-11-27 NOTE — ED ADULT NURSE NOTE - NSIMPLEMENTINTERV_GEN_ALL_ED
Implemented All Universal Safety Interventions:  [place] to call system. Call bell, personal items and telephone within reach. Instruct patient to call for assistance. Room bathroom lighting operational. Non-slip footwear when patient is off stretcher. Physically safe environment: no spills, clutter or unnecessary equipment. Stretcher in lowest position, wheels locked, appropriate side rails in place.
- - -

## 2021-11-27 NOTE — PATIENT PROFILE ADULT - HOW PATIENT ADDRESSED, PROFILE
Referred by: Peggy Dsouza MD; Medical Diagnosis (from order):    Diagnosis Information      Diagnosis    726.2 (ICD-9-CM) - M75.41 (ICD-10-CM) - Impingement syndrome of right shoulder                Occupational Therapy -  Daily Treatment Note    Visit:  2     SUBJECTIVE                                                                                                             \"I've been doing the exercises\"      OBJECTIVE                                                                                                                        TREATMENT                                                                                                                  Therapeutic Exercise:  UBE 2' for/rev  1/2 bolster HA, alt flex X 10  Doorframe stretch x 10      Manual Therapy:  STM to shoulder to decrease pain and soft tissue tightness.        Un-attended Electrical Stimulation (56801/)  Location: R shoulder  Intensity: patient tolerance  In conjunction with: moist hot pack  Duration: 12  Results: decreased pain and improved range of motion  Reaction: no adverse reaction to treatment      ASSESSMENT                                                                                                             Pt was seen for OT session with focus on pain management with IFC/MHP, decreasing soft tissue tightness with STM and therex to increase ROM and strength for improved ADL performance. Pt demo good followthrough with HEP since initial eval.  Pt reports slight improvements in pain since initial eval.  Pt able to perform 1/2 bolter exercise, doorframe stretch and UBE without sig increase in pain. Pt would benefit from cont OT to improve deficit areas and max I.      Patient Education:   Results of above outlined education: Verbalizes understanding and Demonstrates understanding      PLAN                                                                                                                            Suggestions for next session as indicated: Progress per plan of care  tband RTC         Procedures and total treatment time documented Time Entry flowsheet.   Juan Daniel

## 2021-11-28 LAB
ANION GAP SERPL CALC-SCNC: 5 MMOL/L — SIGNIFICANT CHANGE UP (ref 5–17)
BASOPHILS # BLD AUTO: 0.07 K/UL — SIGNIFICANT CHANGE UP (ref 0–0.2)
BASOPHILS NFR BLD AUTO: 1 % — SIGNIFICANT CHANGE UP (ref 0–2)
BUN SERPL-MCNC: 19 MG/DL — SIGNIFICANT CHANGE UP (ref 7–23)
CALCIUM SERPL-MCNC: 8.7 MG/DL — SIGNIFICANT CHANGE UP (ref 8.5–10.1)
CHLORIDE SERPL-SCNC: 106 MMOL/L — SIGNIFICANT CHANGE UP (ref 96–108)
CO2 SERPL-SCNC: 27 MMOL/L — SIGNIFICANT CHANGE UP (ref 22–31)
COVID-19 NUCLEOCAPSID GAM AB INTERP: POSITIVE
COVID-19 NUCLEOCAPSID TOTAL GAM ANTIBODY RESULT: 39.8 INDEX — HIGH
COVID-19 SPIKE DOMAIN AB INTERP: POSITIVE
COVID-19 SPIKE DOMAIN ANTIBODY RESULT: >250 U/ML — HIGH
CREAT SERPL-MCNC: 0.96 MG/DL — SIGNIFICANT CHANGE UP (ref 0.5–1.3)
CULTURE RESULTS: NO GROWTH — SIGNIFICANT CHANGE UP
EOSINOPHIL # BLD AUTO: 0.28 K/UL — SIGNIFICANT CHANGE UP (ref 0–0.5)
EOSINOPHIL NFR BLD AUTO: 4.2 % — SIGNIFICANT CHANGE UP (ref 0–6)
GLUCOSE SERPL-MCNC: 107 MG/DL — HIGH (ref 70–99)
HCT VFR BLD CALC: 25.2 % — LOW (ref 39–50)
HCT VFR BLD CALC: 27.3 % — LOW (ref 39–50)
HGB BLD-MCNC: 7.9 G/DL — LOW (ref 13–17)
HGB BLD-MCNC: 8.7 G/DL — LOW (ref 13–17)
IMM GRANULOCYTES NFR BLD AUTO: 0.4 % — SIGNIFICANT CHANGE UP (ref 0–1.5)
LYMPHOCYTES # BLD AUTO: 1.85 K/UL — SIGNIFICANT CHANGE UP (ref 1–3.3)
LYMPHOCYTES # BLD AUTO: 27.5 % — SIGNIFICANT CHANGE UP (ref 13–44)
MCHC RBC-ENTMCNC: 28.1 PG — SIGNIFICANT CHANGE UP (ref 27–34)
MCHC RBC-ENTMCNC: 28.5 PG — SIGNIFICANT CHANGE UP (ref 27–34)
MCHC RBC-ENTMCNC: 31.3 GM/DL — LOW (ref 32–36)
MCHC RBC-ENTMCNC: 31.9 GM/DL — LOW (ref 32–36)
MCV RBC AUTO: 89.5 FL — SIGNIFICANT CHANGE UP (ref 80–100)
MCV RBC AUTO: 89.7 FL — SIGNIFICANT CHANGE UP (ref 80–100)
MONOCYTES # BLD AUTO: 0.9 K/UL — SIGNIFICANT CHANGE UP (ref 0–0.9)
MONOCYTES NFR BLD AUTO: 13.4 % — SIGNIFICANT CHANGE UP (ref 2–14)
NEUTROPHILS # BLD AUTO: 3.59 K/UL — SIGNIFICANT CHANGE UP (ref 1.8–7.4)
NEUTROPHILS NFR BLD AUTO: 53.5 % — SIGNIFICANT CHANGE UP (ref 43–77)
PLATELET # BLD AUTO: 306 K/UL — SIGNIFICANT CHANGE UP (ref 150–400)
PLATELET # BLD AUTO: 307 K/UL — SIGNIFICANT CHANGE UP (ref 150–400)
POTASSIUM SERPL-MCNC: 4.4 MMOL/L — SIGNIFICANT CHANGE UP (ref 3.5–5.3)
POTASSIUM SERPL-SCNC: 4.4 MMOL/L — SIGNIFICANT CHANGE UP (ref 3.5–5.3)
RBC # BLD: 2.81 M/UL — LOW (ref 4.2–5.8)
RBC # BLD: 3.05 M/UL — LOW (ref 4.2–5.8)
RBC # FLD: 15.7 % — HIGH (ref 10.3–14.5)
RBC # FLD: 15.9 % — HIGH (ref 10.3–14.5)
SARS-COV-2 IGG+IGM SERPL QL IA: 39.8 INDEX — HIGH
SARS-COV-2 IGG+IGM SERPL QL IA: >250 U/ML — HIGH
SARS-COV-2 IGG+IGM SERPL QL IA: POSITIVE
SARS-COV-2 IGG+IGM SERPL QL IA: POSITIVE
SODIUM SERPL-SCNC: 138 MMOL/L — SIGNIFICANT CHANGE UP (ref 135–145)
SPECIMEN SOURCE: SIGNIFICANT CHANGE UP
WBC # BLD: 6.72 K/UL — SIGNIFICANT CHANGE UP (ref 3.8–10.5)
WBC # BLD: 7.35 K/UL — SIGNIFICANT CHANGE UP (ref 3.8–10.5)
WBC # FLD AUTO: 6.72 K/UL — SIGNIFICANT CHANGE UP (ref 3.8–10.5)
WBC # FLD AUTO: 7.35 K/UL — SIGNIFICANT CHANGE UP (ref 3.8–10.5)

## 2021-11-28 PROCEDURE — 99232 SBSQ HOSP IP/OBS MODERATE 35: CPT

## 2021-11-28 RX ORDER — ALPRAZOLAM 0.25 MG
0.25 TABLET ORAL ONCE
Refills: 0 | Status: DISCONTINUED | OUTPATIENT
Start: 2021-11-28 | End: 2021-11-28

## 2021-11-28 RX ORDER — PANTOPRAZOLE SODIUM 20 MG/1
40 TABLET, DELAYED RELEASE ORAL
Refills: 0 | Status: DISCONTINUED | OUTPATIENT
Start: 2021-11-28 | End: 2021-12-01

## 2021-11-28 RX ADMIN — FAMOTIDINE 20 MILLIGRAM(S): 10 INJECTION INTRAVENOUS at 21:51

## 2021-11-28 RX ADMIN — TAMSULOSIN HYDROCHLORIDE 0.4 MILLIGRAM(S): 0.4 CAPSULE ORAL at 21:51

## 2021-11-28 RX ADMIN — Medication 600 MILLIGRAM(S): at 10:26

## 2021-11-28 RX ADMIN — MONTELUKAST 10 MILLIGRAM(S): 4 TABLET, CHEWABLE ORAL at 21:51

## 2021-11-28 RX ADMIN — Medication 650 MILLIGRAM(S): at 04:13

## 2021-11-28 RX ADMIN — Medication 1 TABLET(S): at 10:26

## 2021-11-28 RX ADMIN — Medication 0.25 MILLIGRAM(S): at 21:51

## 2021-11-28 RX ADMIN — LISINOPRIL 20 MILLIGRAM(S): 2.5 TABLET ORAL at 10:26

## 2021-11-28 RX ADMIN — ATORVASTATIN CALCIUM 20 MILLIGRAM(S): 80 TABLET, FILM COATED ORAL at 21:51

## 2021-11-28 RX ADMIN — Medication 650 MILLIGRAM(S): at 23:21

## 2021-11-28 RX ADMIN — ZOLPIDEM TARTRATE 5 MILLIGRAM(S): 10 TABLET ORAL at 23:19

## 2021-11-28 RX ADMIN — Medication 600 MILLIGRAM(S): at 21:52

## 2021-11-28 RX ADMIN — DRONEDARONE 400 MILLIGRAM(S): 400 TABLET, FILM COATED ORAL at 10:26

## 2021-11-28 RX ADMIN — DRONEDARONE 400 MILLIGRAM(S): 400 TABLET, FILM COATED ORAL at 21:52

## 2021-11-28 RX ADMIN — Medication 1 SPRAY(S): at 21:52

## 2021-11-28 NOTE — PROGRESS NOTE ADULT - REASON FOR ADMISSION
GI bleed, anemia sec. to acute blood loss Dizziness, dark stool, acute anemia concerning for GI bleed

## 2021-11-28 NOTE — PROGRESS NOTE ADULT - ASSESSMENT
patient is a 71 yo male with Hx. of  CAD, Atrial fib. s/p Ablation, on Xarelto, anemia , developed melena 6 weeks ago, s/p recent EGD, capsule enteroscopy, Colonoscopy on 11/4/21 no bleeding source found,   presented in the ED for dizziness,  for 2 days and worse today. His Bp was low this am. He was planing to have Watchman device placed at [facility] to be able to stop AC for atrial fib.    Called to see patient in coverage for Dr. Mejia.  Patient with on and off dark stools with past several weeks, he underwent an evaluation at [place] with upper endoscopy and capsule endoscopy recently that was reportedly negative, then had a colonoscopy last week with Dr. Mejia normal for a small hyperplastic polyp that was resected.  The patient continues to have dark stools on and off and takes Xarelto.  He was amended with a hemoglobin of 6 and given blood transfusion.  He denies any hematochezia or hematemesis, no nausea no vomiting or abdominal pain.    Recently had upper endoscopy, capsule endoscopy, colonoscopy without a source for bleeding found.  History still favors an upper GI bleed or small bowel bleed.  Currently off and coagulation due to bleeding.    Recommendations:  -Trend CBC  -PPI  -Recommend upper endoscopy with push enteroscopy tomorrow with Dr. Mejia as next step  -NPO p MN  -Risks benefits and alternatives discussed with patient who is in agreement  -Continue to hold Xarelto for now  -Dr. Mejia to resume care in AM 70 year old man with HTN, HLD, CAD, afib s/p ablation, on Xarelto, developed melena 6 weeks ago, s/p recent EGD, capsule enteroscopy, colonoscopy in early November with no bleeding source found, now presented 11/27 with dizziness for two days, dark stools, possibly melanotic. In the ED, Hgb was 6.0. Hemodynamics were WNL. He was ordered for 2u PRBc and admitted to Medicine with GI consult.     Acute anemia, presumably due to recurrent GI bleeding  Patient with on and off dark stools with past several weeks, he underwent an evaluation at [place] with upper endoscopy and capsule endoscopy recently that was reportedly negative, then had a colonoscopy last week with Dr. Mejia normal aside from a small hyperplastic polyp that was resected.  The patient continues to have dark stools on and off while taking Xarelto for afib. Hgb 6 in ED. Now s/p 2u PRBC with repeat Hgb 8.7. Xarelto held since admission. No further melena today. No further dizziness. Appreciate input from GI. Plan is for EGD with push enteroscopy tomorrow with Dr. Mejia.  - Serial CBC  - NPO except meds after midnight   or hematemesis, no nausea no vomiting or abdominal pain.  Upper endoscopy with push enteroscopy tomorrow with Dr. Mejia as next step  - PPI daily  -Continue to hold Xarelto for now    Afib  NSR, rate WNL, occ PACs. On dronedarone. Xarelto held. Aprpeciate input from Cardiology Dr. Rudolph.  - Continue dronedarone  - Holding Xarelto    HTN, HLD, CAD  BP stable. No angina or CHF sx. Trop neg. No ischemia on EKG.  - Continue lisinopril  - Continue statin, gemfibrozil

## 2021-11-28 NOTE — PROGRESS NOTE ADULT - SUBJECTIVE AND OBJECTIVE BOX
Chief complaint: Dizziness, dark stool    Interval History: No acute events overnight. No further melena. Patient reports stool is now dark brown. No abdominal pain. No fever or chills. No further dizziness or malaise since receiving 2u PRBC since admission. Hgb now improved, 6.0 --> 8.7. Appreciate input from Cardiology. Holding Xarelto. Appreciate input from GI. Plan for EGD with push enteroscopy tomorrow with Dr Mejia.     ROS: Multisystem review is comprehensively negative x 10 systems except as above    Physical Exam:  Vital Signs Last 24 Hrs  T(F): 98.1 (28 Nov 2021 20:12), Max: 98.1 (28 Nov 2021 20:12)  HR: 68 (28 Nov 2021 20:12) (65 - 78)  BP: 99/42 (28 Nov 2021 20:12) (99/42 - 123/57)  RR: 18 (28 Nov 2021 07:47) (18 - 18)  SpO2: 99% (28 Nov 2021 20:12) (98% - 99%)    Comfortable appearing  NCAT PERRL EOMI  Neck supple no JVD  Chest CTA B/L  CVS S1 S2 normal RRR  Abd +BS soft NT NT  Ext: No edema or calf tenderness  Skin Intact  Neuro A+Ox3, no deficits  Mood: Calm, pleasant    Labs:    CBC 11/28                         8.7    7.35  )---------( 307                    27.3     BMP 11/28    138  |  106  |  19  --------------------< 107  4.4   |   27   |  0.96    Ca 8.7    LFTs 11/27    TPro  6.4  /  Alb  2.8  /  TBili  0.3  /  DBili  x   /  AST  11  /  ALT  17  /  AlkPhos  105      Coags 11/27    PT: 20.7 sec;   INR: 1.84 ratio;  PTT:44.3 sec Chief complaint: Dizziness, dark stool    Interval History: No acute events overnight. No further melena. Patient reports stool is now dark brown. No abdominal pain. No fever or chills. No further dizziness or malaise since receiving 2u PRBC since admission. Hgb now improved, 6.0 --> 8.7. Appreciate input from Cardiology. Holding Xarelto. Appreciate input from GI. Plan for EGD with push enteroscopy tomorrow with Dr Mejia.     ROS: Multisystem review is comprehensively negative x 10 systems except as above    Physical Exam:  Vital Signs Last 24 Hrs  T(F): 98.1 (28 Nov 2021 20:12), Max: 98.1 (28 Nov 2021 20:12)  HR: 68 (28 Nov 2021 20:12) (65 - 78)  BP: 99/42 (28 Nov 2021 20:12) (99/42 - 123/57)  RR: 18 (28 Nov 2021 07:47) (18 - 18)  SpO2: 99% (28 Nov 2021 20:12) (98% - 99%)    Comfortable appearing  NCAT PERRL EOMI  Neck supple no JVD  Chest CTA B/L  CVS S1 S2 normal RRR  Abd +BS soft NT NT  Ext: No edema or calf tenderness  Skin Intact  Neuro A+Ox3, no deficits  Mood: Calm, pleasant    Labs:    CBC 11/28                         8.7    7.35  )---------( 307                    27.3     BMP 11/28    138  |  106  |  19  --------------------< 107  4.4   |   27   |  0.96    Ca 8.7    LFTs 11/27    TPro  6.4  /  Alb  2.8  /  TBili  0.3  /  DBili  x   /  AST  11  /  ALT  17  /  AlkPhos  105      Coags 11/27    PT: 20.7 sec;   INR: 1.84 ratio;  PTT: 44.3 sec    Trop (-)      Micro:  COVID19 PCR 11/27: Neg    Imaging:  CXR 11/27: no evidence of infiltrate, pleural effusion or vascular congestion. No atelectasis is seen. The cardiac silhouette is normal in size. Osseous structures are intact.    Cardiac Testing:  Tele 11/8: Mostly NSR, occ PACs    EKG 11/27: NSR, normal rate, no ischemic changes    Meds:  MEDICATIONS  (STANDING):  atorvastatin 20 milliGRAM(s) Oral at bedtime  dronedarone 400 milliGRAM(s) Oral two times a day  famotidine    Tablet 20 milliGRAM(s) Oral daily  fluticasone propionate 50 MICROgram(s)/spray Nasal Spray 1 Spray(s) Both Nostrils two times a day  gemfibrozil 600 milliGRAM(s) Oral two times a day  lisinopril 20 milliGRAM(s) Oral daily  montelukast 10 milliGRAM(s) Oral at bedtime  multivitamin 1 Tablet(s) Oral daily  pantoprazole    Tablet 40 milliGRAM(s) Oral before breakfast  tamsulosin 0.4 milliGRAM(s) Oral at bedtime    MEDICATIONS  (PRN):  acetaminophen     Tablet .. 650 milliGRAM(s) Oral every 6 hours PRN Temp greater or equal to 38C (100.4F), Mild Pain (1 - 3)  aluminum hydroxide/magnesium hydroxide/simethicone Suspension 30 milliLiter(s) Oral every 4 hours PRN Dyspepsia  melatonin 3 milliGRAM(s) Oral at bedtime PRN Insomnia  zolpidem 5 milliGRAM(s) Oral at bedtime PRN Insomnia  zolpidem 5 milliGRAM(s) Oral at bedtime PRN Insomnia

## 2021-11-29 ENCOUNTER — TRANSCRIPTION ENCOUNTER (OUTPATIENT)
Age: 71
End: 2021-11-29

## 2021-11-29 ENCOUNTER — RESULT REVIEW (OUTPATIENT)
Age: 71
End: 2021-11-29

## 2021-11-29 LAB
ALBUMIN SERPL ELPH-MCNC: 2.9 G/DL — LOW (ref 3.3–5)
ALP SERPL-CCNC: 99 U/L — SIGNIFICANT CHANGE UP (ref 40–120)
ALT FLD-CCNC: 17 U/L — SIGNIFICANT CHANGE UP (ref 12–78)
ANION GAP SERPL CALC-SCNC: 5 MMOL/L — SIGNIFICANT CHANGE UP (ref 5–17)
AST SERPL-CCNC: 11 U/L — LOW (ref 15–37)
BASOPHILS # BLD AUTO: 0.05 K/UL — SIGNIFICANT CHANGE UP (ref 0–0.2)
BASOPHILS NFR BLD AUTO: 0.5 % — SIGNIFICANT CHANGE UP (ref 0–2)
BILIRUB DIRECT SERPL-MCNC: 0.1 MG/DL — SIGNIFICANT CHANGE UP (ref 0–0.3)
BILIRUB INDIRECT FLD-MCNC: 0.2 MG/DL — SIGNIFICANT CHANGE UP (ref 0.2–1)
BILIRUB SERPL-MCNC: 0.3 MG/DL — SIGNIFICANT CHANGE UP (ref 0.2–1.2)
BUN SERPL-MCNC: 21 MG/DL — SIGNIFICANT CHANGE UP (ref 7–23)
CALCIUM SERPL-MCNC: 8.3 MG/DL — LOW (ref 8.5–10.1)
CHLORIDE SERPL-SCNC: 107 MMOL/L — SIGNIFICANT CHANGE UP (ref 96–108)
CO2 SERPL-SCNC: 25 MMOL/L — SIGNIFICANT CHANGE UP (ref 22–31)
CREAT SERPL-MCNC: 0.99 MG/DL — SIGNIFICANT CHANGE UP (ref 0.5–1.3)
EOSINOPHIL # BLD AUTO: 0.27 K/UL — SIGNIFICANT CHANGE UP (ref 0–0.5)
EOSINOPHIL NFR BLD AUTO: 2.9 % — SIGNIFICANT CHANGE UP (ref 0–6)
GLUCOSE SERPL-MCNC: 105 MG/DL — HIGH (ref 70–99)
HCT VFR BLD CALC: 25.2 % — LOW (ref 39–50)
HCT VFR BLD CALC: 27.6 % — LOW (ref 39–50)
HGB BLD-MCNC: 8.2 G/DL — LOW (ref 13–17)
HGB BLD-MCNC: 8.6 G/DL — LOW (ref 13–17)
IMM GRANULOCYTES NFR BLD AUTO: 0.4 % — SIGNIFICANT CHANGE UP (ref 0–1.5)
LYMPHOCYTES # BLD AUTO: 1.24 K/UL — SIGNIFICANT CHANGE UP (ref 1–3.3)
LYMPHOCYTES # BLD AUTO: 13.5 % — SIGNIFICANT CHANGE UP (ref 13–44)
MAGNESIUM SERPL-MCNC: 2.2 MG/DL — SIGNIFICANT CHANGE UP (ref 1.6–2.6)
MCHC RBC-ENTMCNC: 29.4 PG — SIGNIFICANT CHANGE UP (ref 27–34)
MCHC RBC-ENTMCNC: 32.5 GM/DL — SIGNIFICANT CHANGE UP (ref 32–36)
MCV RBC AUTO: 90.3 FL — SIGNIFICANT CHANGE UP (ref 80–100)
MONOCYTES # BLD AUTO: 1.01 K/UL — HIGH (ref 0–0.9)
MONOCYTES NFR BLD AUTO: 11 % — SIGNIFICANT CHANGE UP (ref 2–14)
NEUTROPHILS # BLD AUTO: 6.55 K/UL — SIGNIFICANT CHANGE UP (ref 1.8–7.4)
NEUTROPHILS NFR BLD AUTO: 71.7 % — SIGNIFICANT CHANGE UP (ref 43–77)
PHOSPHATE SERPL-MCNC: 3 MG/DL — SIGNIFICANT CHANGE UP (ref 2.5–4.5)
PLATELET # BLD AUTO: 302 K/UL — SIGNIFICANT CHANGE UP (ref 150–400)
POTASSIUM SERPL-MCNC: 4.3 MMOL/L — SIGNIFICANT CHANGE UP (ref 3.5–5.3)
POTASSIUM SERPL-SCNC: 4.3 MMOL/L — SIGNIFICANT CHANGE UP (ref 3.5–5.3)
PROT SERPL-MCNC: 6.5 GM/DL — SIGNIFICANT CHANGE UP (ref 6–8.3)
RBC # BLD: 2.79 M/UL — LOW (ref 4.2–5.8)
RBC # FLD: 16.1 % — HIGH (ref 10.3–14.5)
SODIUM SERPL-SCNC: 137 MMOL/L — SIGNIFICANT CHANGE UP (ref 135–145)
WBC # BLD: 9.16 K/UL — SIGNIFICANT CHANGE UP (ref 3.8–10.5)
WBC # FLD AUTO: 9.16 K/UL — SIGNIFICANT CHANGE UP (ref 3.8–10.5)

## 2021-11-29 PROCEDURE — 88305 TISSUE EXAM BY PATHOLOGIST: CPT | Mod: 26

## 2021-11-29 PROCEDURE — 88312 SPECIAL STAINS GROUP 1: CPT | Mod: 26

## 2021-11-29 PROCEDURE — 99232 SBSQ HOSP IP/OBS MODERATE 35: CPT

## 2021-11-29 PROCEDURE — 74177 CT ABD & PELVIS W/CONTRAST: CPT | Mod: 26

## 2021-11-29 RX ORDER — LISINOPRIL 2.5 MG/1
10 TABLET ORAL DAILY
Refills: 0 | Status: DISCONTINUED | OUTPATIENT
Start: 2021-11-29 | End: 2021-12-01

## 2021-11-29 RX ORDER — CX-024414 0.2 MG/ML
0.5 INJECTION, SUSPENSION INTRAMUSCULAR
Qty: 0 | Refills: 0 | DISCHARGE

## 2021-11-29 RX ORDER — ACETAMINOPHEN 500 MG
2 TABLET ORAL
Qty: 0 | Refills: 0 | DISCHARGE
Start: 2021-11-29

## 2021-11-29 RX ORDER — FAMOTIDINE 10 MG/ML
1 INJECTION INTRAVENOUS
Qty: 0 | Refills: 0 | DISCHARGE
Start: 2021-11-29

## 2021-11-29 RX ORDER — GEMFIBROZIL 600 MG
1 TABLET ORAL
Qty: 0 | Refills: 0 | DISCHARGE
Start: 2021-11-29

## 2021-11-29 RX ORDER — INFLUENZA VIRUS VACCINE 15; 15; 15; 15 UG/.5ML; UG/.5ML; UG/.5ML; UG/.5ML
0.5 SUSPENSION INTRAMUSCULAR
Qty: 0 | Refills: 0 | DISCHARGE

## 2021-11-29 RX ORDER — RIVAROXABAN 15 MG-20MG
20 KIT ORAL
Refills: 0 | Status: DISCONTINUED | OUTPATIENT
Start: 2021-11-29 | End: 2021-11-30

## 2021-11-29 RX ORDER — LISINOPRIL 2.5 MG/1
1 TABLET ORAL
Qty: 0 | Refills: 0 | DISCHARGE

## 2021-11-29 RX ORDER — DRONEDARONE 400 MG/1
1 TABLET, FILM COATED ORAL
Qty: 0 | Refills: 0 | DISCHARGE

## 2021-11-29 RX ORDER — FAMOTIDINE 10 MG/ML
1 INJECTION INTRAVENOUS
Qty: 0 | Refills: 0 | DISCHARGE

## 2021-11-29 RX ORDER — ZOLPIDEM TARTRATE 10 MG/1
1 TABLET ORAL
Qty: 0 | Refills: 0 | DISCHARGE
Start: 2021-11-29

## 2021-11-29 RX ORDER — GEMFIBROZIL 600 MG
1 TABLET ORAL
Qty: 0 | Refills: 0 | DISCHARGE

## 2021-11-29 RX ORDER — LISINOPRIL 2.5 MG/1
1 TABLET ORAL
Qty: 0 | Refills: 0 | DISCHARGE
Start: 2021-11-29

## 2021-11-29 RX ORDER — DRONEDARONE 400 MG/1
1 TABLET, FILM COATED ORAL
Qty: 0 | Refills: 0 | DISCHARGE
Start: 2021-11-29

## 2021-11-29 RX ADMIN — Medication 650 MILLIGRAM(S): at 23:09

## 2021-11-29 RX ADMIN — DRONEDARONE 400 MILLIGRAM(S): 400 TABLET, FILM COATED ORAL at 21:04

## 2021-11-29 RX ADMIN — DRONEDARONE 400 MILLIGRAM(S): 400 TABLET, FILM COATED ORAL at 13:06

## 2021-11-29 RX ADMIN — FAMOTIDINE 20 MILLIGRAM(S): 10 INJECTION INTRAVENOUS at 21:05

## 2021-11-29 RX ADMIN — TAMSULOSIN HYDROCHLORIDE 0.4 MILLIGRAM(S): 0.4 CAPSULE ORAL at 21:05

## 2021-11-29 RX ADMIN — ATORVASTATIN CALCIUM 20 MILLIGRAM(S): 80 TABLET, FILM COATED ORAL at 21:05

## 2021-11-29 RX ADMIN — RIVAROXABAN 20 MILLIGRAM(S): KIT at 21:04

## 2021-11-29 RX ADMIN — MONTELUKAST 10 MILLIGRAM(S): 4 TABLET, CHEWABLE ORAL at 21:04

## 2021-11-29 RX ADMIN — Medication 1 SPRAY(S): at 13:06

## 2021-11-29 RX ADMIN — Medication 1 SPRAY(S): at 21:05

## 2021-11-29 RX ADMIN — LISINOPRIL 10 MILLIGRAM(S): 2.5 TABLET ORAL at 14:17

## 2021-11-29 RX ADMIN — Medication 600 MILLIGRAM(S): at 21:04

## 2021-11-29 RX ADMIN — ZOLPIDEM TARTRATE 5 MILLIGRAM(S): 10 TABLET ORAL at 23:09

## 2021-11-29 RX ADMIN — Medication 600 MILLIGRAM(S): at 13:06

## 2021-11-29 RX ADMIN — Medication 1 TABLET(S): at 13:08

## 2021-11-29 NOTE — CONSULT NOTE ADULT - SUBJECTIVE AND OBJECTIVE BOX
Patient is a 70y old  Male who presents with a chief complaint of GI bleed, anemia sec. to acute blood loss (2021 19:33)      HPI:  HPI: The patient is a 71 yo male with Hx. of  CAD, Atrial fib. s/p Ablation, on Xarelto, anemia , developed melena 6 weeks ago, s/p recent EGD, capsule enteroscopy, Colonoscopy on 21 no bleeding source found,   presented in the ED for dizziness,  for 2 days and worse today. His Bp was low this am. He was planing to have Watchman device placed at [facility] to be able to stop AC for atrial fib.   - still has noted dark stools this AM . denies lightheadness, no CP or SOB     PMHx: CAD, Atrial fib. s/p Ablation, on Xarelto, anemia, HTN, HLD,     PSHx: AV ablasion ,  C5-6 Laminectomh 8/10/21    Family Hx: Father had CAD/ MI, Mother had diabetes.    Social Hx.: not smoking, no alcohol use    ROS:   Eyes: no changes in vision    ENT/Mouth: no changes    Cardiovascular: no chest pain    Respiratory: no SOB    Gastrointestinal: no diarrhea, no nausea, , had dark stool.     Genitourinary: no dysuria    Breast: no pain    Musculoskeletal: no pain    Integumentary: no itching    Neurological: No Headache, no tremor,    Psychiatric: no suicidal ideations    Endocrine: no excessive thirst,     Hematologic/Lymphatic: no swollen glands    Allergic/Immunologic: no itching      Physical Exam: Vital Signs Last 24 Hrs  T(C): 36.5 (2021 18:49), Max: 36.7 (2021 14:32)  T(F): 97.7 (2021 18:49), Max: 98 (2021 14:32)  HR: 69 (2021 18:49) (63 - 78)  BP: 109/87 (2021 18:49) (109/87 - 133/56)  BP(mean): 93 (2021 18:49) (93 - 93)  RR: 18 (2021 18:49) (17 - 18)  SpO2: 100% (2021 18:49) (96% - 100%)        HEENT: PRRL EOMI    MOUTH/TEETH/GUMS: Clear    NECK: no JVD    LUNGS: Clear    HEART: S1,S2 RR    ABDOMEN: soft nontender    EXTREMITIES:  no pedal edema    MUSCULOSKELETAL: no joint swelling     NEURO: no tremor, no focal signs.    SKIN: no rash    : CVA negative,     Lab:                       6.0    9.54  )-----------( 306      ( 2021 12:16 )             19.4           135  |  103  |  26<H>  ----------------------------<  138<H>  4.0   |  26  |  1.07    Ca    8.5      2021 12:16    TPro  6.4  /  Alb  2.8<L>  /  TBili  0.3  /  DBili  x   /  AST  11<L>  /  ALT  17  /  AlkPhos  105           (2021 19:33)      PAST MEDICAL & SURGICAL HISTORY:  HTN (hypertension)    Nephrolithiasis    H/O bronchitis  COVID+ 2020    Dark stools    2019 novel coronavirus disease (COVID-19)    H/O inguinal hernia repair    H/O cardiac radiofrequency ablation    H/O endoscopy    Chronic neck pain with history of cervical spinal surgery    H/O heart artery stent                                      MEDICATIONS  (STANDING):  atorvastatin 20 milliGRAM(s) Oral at bedtime  dronedarone 400 milliGRAM(s) Oral two times a day  famotidine    Tablet 20 milliGRAM(s) Oral daily  fluticasone propionate 50 MICROgram(s)/spray Nasal Spray 1 Spray(s) Both Nostrils two times a day  gemfibrozil 600 milliGRAM(s) Oral two times a day  lisinopril 20 milliGRAM(s) Oral daily  montelukast 10 milliGRAM(s) Oral at bedtime  multivitamin 1 Tablet(s) Oral daily  tamsulosin 0.4 milliGRAM(s) Oral at bedtime    MEDICATIONS  (PRN):  acetaminophen     Tablet .. 650 milliGRAM(s) Oral every 6 hours PRN Temp greater or equal to 38C (100.4F), Mild Pain (1 - 3)  aluminum hydroxide/magnesium hydroxide/simethicone Suspension 30 milliLiter(s) Oral every 4 hours PRN Dyspepsia  melatonin 3 milliGRAM(s) Oral at bedtime PRN Insomnia  zolpidem 5 milliGRAM(s) Oral at bedtime PRN Insomnia  zolpidem 5 milliGRAM(s) Oral at bedtime PRN Insomnia      FAMILY HISTORY:  Patient&#x27;s mother is   and so is father, both from heart disease, father with MI in early 40&#x27;s        SOCIAL HISTORY:    CIGARETTES:        Vital Signs Last 24 Hrs  T(C): 36.4 (2021 07:47), Max: 36.7 (2021 14:32)  T(F): 97.6 (2021 07:47), Max: 98 (2021 14:32)  HR: 78 (2021 07:47) (63 - 78)  BP: 118/65 (2021 07:47) (104/44 - 133/56)  BP(mean): 93 (2021 18:49) (93 - 93)  RR: 18 (2021 07:47) (17 - 18)  SpO2: 98% (2021 07:47) (96% - 100%)            INTERPRETATION OF TELEMETRY:    ECG:    I&O's Detail    2021 07:01  -  2021 07:00  --------------------------------------------------------  IN:    PRBCs (Packed Red Blood Cells): 317 mL  Total IN: 317 mL    OUT:    Voided (mL): 875 mL  Total OUT: 875 mL    Total NET: -558 mL          LABS:                        8.7    7.35  )-----------( 307      ( 2021 07:47 )             27.3     11-28    138  |  106  |  19  ----------------------------<  107<H>  4.4   |  27  |  0.96    Ca    8.7      2021 07:47    TPro  6.4  /  Alb  2.8<L>  /  TBili  0.3  /  DBili  x   /  AST  11<L>  /  ALT  17  /  AlkPhos  105  11-27        PT/INR - ( 2021 12:16 )   PT: 20.7 sec;   INR: 1.84 ratio         PTT - ( 2021 12:16 )  PTT:44.3 sec  Urinalysis Basic - ( 2021 17:10 )    Color: Pale Yellow / Appearance: Clear / S.010 / pH: x  Gluc: x / Ketone: Negative  / Bili: Negative / Urobili: Negative mg/dL   Blood: x / Protein: Negative mg/dL / Nitrite: Negative   Leuk Esterase: Negative / RBC: x / WBC x   Sq Epi: x / Non Sq Epi: x / Bacteria: x      I&O's Summary    2021 07:01  -  2021 07:00  --------------------------------------------------------  IN: 317 mL / OUT: 875 mL / NET: -558 mL      BNPSerum Pro-Brain Natriuretic Peptide: 136 pg/mL ( @ 12:16)    RADIOLOGY & ADDITIONAL STUDIES:
Patient is a 70y old  Male who presents with a chief complaint of GI bleed, anemia sec. to acute blood loss.      HPI:  HPI: The patient is a 71 yo male with Hx. of  CAD, Atrial fib. s/p Ablation, on Xarelto, anemia , developed melena 6 weeks ago, s/p recent EGD, capsule enteroscopy, Colonoscopy on 21 no bleeding source found,   presented in the ED for dizziness,  for 2 days and worse today. His Bp was low this am. He was planing to have Watchman device placed at [facility] to be able to stop AC for atrial fib.    Pt seen this am.  S/p EGD.  Pt denies any CP or SOB.  c/o anxiety and palpitations.     PMHx: CAD, Atrial fib. s/p Ablation, on Xarelto, anemia, HTN, HLD,     PSHx: AV ablasion ,  C5-6 Laminectomh 8/10/21    Family Hx: Father had CAD/ MI, Mother had diabetes.    Social Hx.: not smoking, no alcohol use      PAST MEDICAL & SURGICAL HISTORY:  HTN (hypertension)    Nephrolithiasis    H/O bronchitis  COVID+ 2020    Dark stools    2019 novel coronavirus disease (COVID-19)    H/O inguinal hernia repair    H/O cardiac radiofrequency ablation    H/O endoscopy    Chronic neck pain with history of cervical spinal surgery    H/O heart artery stent        MEDICATIONS  (STANDING):  atorvastatin 20 milliGRAM(s) Oral at bedtime  dronedarone 400 milliGRAM(s) Oral two times a day  famotidine    Tablet 20 milliGRAM(s) Oral daily  fluticasone propionate 50 MICROgram(s)/spray Nasal Spray 1 Spray(s) Both Nostrils two times a day  gemfibrozil 600 milliGRAM(s) Oral two times a day  lisinopril 20 milliGRAM(s) Oral daily  montelukast 10 milliGRAM(s) Oral at bedtime  multivitamin 1 Tablet(s) Oral daily  pantoprazole    Tablet 40 milliGRAM(s) Oral before breakfast  tamsulosin 0.4 milliGRAM(s) Oral at bedtime    MEDICATIONS  (PRN):  acetaminophen     Tablet .. 650 milliGRAM(s) Oral every 6 hours PRN Temp greater or equal to 38C (100.4F), Mild Pain (1 - 3)  aluminum hydroxide/magnesium hydroxide/simethicone Suspension 30 milliLiter(s) Oral every 4 hours PRN Dyspepsia  melatonin 3 milliGRAM(s) Oral at bedtime PRN Insomnia  zolpidem 5 milliGRAM(s) Oral at bedtime PRN Insomnia  zolpidem 5 milliGRAM(s) Oral at bedtime PRN Insomnia      FAMILY HISTORY:  Patient&#x27;s mother is   and so is father, both from heart disease, father with MI in early 40&#x27;s        SOCIAL HISTORY:    REVIEW OF SYSTEMS:  CONSTITUTIONAL:    No fatigue, malaise, lethargy.  No fever or chills.  RESPIRATORY:  No cough.  No wheeze.  No hemoptysis.  No shortness of breath.  CARDIOVASCULAR:  No chest pains.  No palpitations. No shortness of breath, No orthopnea or PND.  GASTROINTESTINAL:  No abdominal pain.  No nausea or vomiting.   c/o melena   GENITOURINARY:    No hematuria.    MUSCULOSKELETAL:  No musculoskeletal pain.  No joint swelling.  No arthritis.  NEUROLOGICAL:  No tingling or numbness or weakness.  PSYCHIATRIC:  No confusion  SKIN:  No rashes.            Vital Signs Last 24 Hrs  T(C): 36.8 (2021 06:26), Max: 36.8 (2021 06:26)  T(F): 98.3 (2021 06:26), Max: 98.3 (2021 06:26)  HR: 63 (2021 06:26) (63 - 68)  BP: 107/41 (2021 06:26) (99/42 - 107/41)  BP(mean): --  RR: 18 (2021 06:26) (18 - 18)  SpO2: 98% (2021 06:26) (98% - 99%)    PHYSICAL EXAM-    Constitutional: The patient appears to be normal, well developed, well nourished and alert and oriented to time, place and person. The patient does not appear acutely ill.     Head: Head is normocephalic and atraumatic.      Neck: No jugular venous distention. No audible carotid bruits. There are strong carotid pulses bilaterally. No JVD.     Cardiovascular: Regular rate and rhythm without S3, S4. No murmurs or rubs are appreciated.      Respiratory: Breathsounds are normal. No rales. No wheezing.    Abdomen: Soft, nontender, nondistended with positive bowel sounds.      Extremity: No tenderness. No  pitting edema     Neurologic: The patient is alert and oriented.      Skin: No rash, no obvious lesions noted.      Psychiatric: The patient appears to be emotionally stable.      INTERPRETATION OF TELEMETRY: SR, ST     ECG: Sinus rythm ST depression that is <1/2 mm in lateral leads.     I&O's Detail      LABS:                        8.2    9.16  )-----------( 302      ( 2021 09:52 )             25.2     11-28    138  |  106  |  19  ----------------------------<  107<H>  4.4   |  27  |  0.96    Ca    8.7      2021 07:47    TPro  6.4  /  Alb  2.8<L>  /  TBili  0.3  /  DBili  x   /  AST  11<L>  /  ALT  17  /  AlkPhos  105  11-27        PT/INR - ( 2021 12:16 )   PT: 20.7 sec;   INR: 1.84 ratio         PTT - ( 2021 12:16 )  PTT:44.3 sec  Urinalysis Basic - ( 2021 17:10 )    Color: Pale Yellow / Appearance: Clear / S.010 / pH: x  Gluc: x / Ketone: Negative  / Bili: Negative / Urobili: Negative mg/dL   Blood: x / Protein: Negative mg/dL / Nitrite: Negative   Leuk Esterase: Negative / RBC: x / WBC x   Sq Epi: x / Non Sq Epi: x / Bacteria: x      I&O's Summary    BNP  RADIOLOGY & ADDITIONAL STUDIES:  
GI consult  Patient of Dr. Mejia    HPI:  HPI: The patient is a 69 yo male with Hx. of  CAD, Atrial fib. s/p Ablation, on Xarelto, anemia , developed melena 6 weeks ago, s/p recent EGD, capsule enteroscopy, Colonoscopy on 11/4/21 no bleeding source found,   presented in the ED for dizziness,  for 2 days and worse today. His Bp was low this am. He was planing to have Watchman device placed at [facility] to be able to stop AC for atrial fib.    Called to see patient in coverage for Dr. Mejia.  Patient with on and off dark stools with past several weeks, he underwent an evaluation at [facility] with upper endoscopy and capsule endoscopy recently that was reportedly negative, then had a colonoscopy last week with Dr. Mejia normal for a small hyperplastic polyp that was resected.  The patient continues to have dark stools on and off and takes Xarelto.  He was amended with a hemoglobin of 6 and given blood transfusion.  He denies any hematochezia or hematemesis, no nausea no vomiting or abdominal pain.    PMHx: CAD, Atrial fib. s/p Ablation, on Xarelto, anemia, HTN, HLD,     PSHx: AV ablasion 12/20,  C5-6 Laminectomy 8/10/21    Family Hx: Father had CAD/ MI, Mother had diabetes.    Social Hx.: not smoking, no alcohol use    ROS:   Eyes: no changes in vision    ENT/Mouth: no changes    Cardiovascular: no chest pain    Respiratory: no SOB    Gastrointestinal: no diarrhea, no nausea, , had dark stool.     Genitourinary: no dysuria    Breast: no pain    Musculoskeletal: no pain    Integumentary: no itching    Neurological: No Headache, no tremor,    Psychiatric: no suicidal ideations    Endocrine: no excessive thirst,     Hematologic/Lymphatic: no swollen glands    Allergic/Immunologic: no itching          Home Medications:  aspirin 81 mg oral tablet: 1 tab(s) orally once a day (27 Nov 2021 16:09)  famotidine 20 mg oral tablet: 1 tab(s) orally once a day (27 Nov 2021 16:09)  Flonase 50 mcg/inh nasal spray: 1 spray(s) nasal once a day (27 Nov 2021 16:09)  Fluad Quadrivalent PF [id number] intramuscular suspension: 0.5 milliliter(s) intramuscular once (27 Nov 2021 16:09)  gemfibrozil 600 mg oral tablet: 1 tab(s) orally 2 times a day (27 Nov 2021 16:09)  lisinopril 20 mg oral tablet: 1 tab(s) orally once a day (27 Nov 2021 16:09)  Moderna COVID-19 Vaccine  mcg/0.5 mL intramuscular suspension: 0.5 milliliter(s) intramuscular once  *** booster 11/12/21*** (27 Nov 2021 16:09)  montelukast 10 mg oral tablet: 1 tab(s) orally once a day (27 Nov 2021 16:09)  Multaq 400 mg oral tablet: 1 tab(s) orally 2 times a day (27 Nov 2021 16:09)  Multiple Vitamins oral tablet: 1 tab(s) orally once a day (27 Nov 2021 16:09)  rosuvastatin 20 mg oral tablet: 1 tab(s) orally once a day (27 Nov 2021 16:09)  tamsulosin 0.4 mg oral capsule: 1 cap(s) orally once a day (27 Nov 2021 16:09)  Xarelto 20 mg oral tablet: 1 tab(s) orally once a day (27 Nov 2021 16:09)  zolpidem 12.5 mg oral tablet, extended release: 1 tab(s) orally once a day (at bedtime) (27 Nov 2021 16:09)      MEDICATIONS  (STANDING):  atorvastatin 20 milliGRAM(s) Oral at bedtime  dronedarone 400 milliGRAM(s) Oral two times a day  famotidine    Tablet 20 milliGRAM(s) Oral daily  fluticasone propionate 50 MICROgram(s)/spray Nasal Spray 1 Spray(s) Both Nostrils two times a day  gemfibrozil 600 milliGRAM(s) Oral two times a day  lisinopril 20 milliGRAM(s) Oral daily  montelukast 10 milliGRAM(s) Oral at bedtime  multivitamin 1 Tablet(s) Oral daily  tamsulosin 0.4 milliGRAM(s) Oral at bedtime    MEDICATIONS  (PRN):  acetaminophen     Tablet .. 650 milliGRAM(s) Oral every 6 hours PRN Temp greater or equal to 38C (100.4F), Mild Pain (1 - 3)  aluminum hydroxide/magnesium hydroxide/simethicone Suspension 30 milliLiter(s) Oral every 4 hours PRN Dyspepsia  melatonin 3 milliGRAM(s) Oral at bedtime PRN Insomnia  zolpidem 5 milliGRAM(s) Oral at bedtime PRN Insomnia  zolpidem 5 milliGRAM(s) Oral at bedtime PRN Insomnia      Allergies    penicillin (Unknown)    Intolerances    PE:  Vital Signs Last 24 Hrs  T(C): 36.4 (28 Nov 2021 07:47), Max: 36.6 (27 Nov 2021 23:11)  T(F): 97.6 (28 Nov 2021 07:47), Max: 97.9 (27 Nov 2021 23:11)  HR: 78 (28 Nov 2021 07:47) (63 - 78)  BP: 118/65 (28 Nov 2021 07:47) (104/44 - 123/59)  BP(mean): 93 (27 Nov 2021 18:49) (93 - 93)  RR: 18 (28 Nov 2021 07:47) (17 - 18)  SpO2: 98% (28 Nov 2021 07:47) (98% - 100%)    Constitutional: NAD, well-developed, A+Ox3  Anicteric   Respiratory: CTABL, breathing comfortably  Cardiovascular: S1 and S2, RRR  Gastrointestinal: +BS, soft, non tender, non distended, no mass  Extremities: warm, well perfused, no edema  Psychiatric: Normal mood, normal affect  Neuro: moves all extremities, grossly intact  Skin: No rashes or lesions    LABS:                        8.7    7.35  )-----------( 307      ( 28 Nov 2021 07:47 )             27.3     11-28    138  |  106  |  19  ----------------------------<  107<H>  4.4   |  27  |  0.96    Ca    8.7      28 Nov 2021 07:47    TPro  6.4  /  Alb  2.8<L>  /  TBili  0.3  /  DBili  x   /  AST  11<L>  /  ALT  17  /  AlkPhos  105  11-27    PT/INR - ( 27 Nov 2021 12:16 )   PT: 20.7 sec;   INR: 1.84 ratio         PTT - ( 27 Nov 2021 12:16 )  PTT:44.3 sec  LIVER FUNCTIONS - ( 27 Nov 2021 12:16 )  Alb: 2.8 g/dL / Pro: 6.4 gm/dL / ALK PHOS: 105 U/L / ALT: 17 U/L / AST: 11 U/L / GGT: x             RADIOLOGY & ADDITIONAL STUDIES:

## 2021-11-29 NOTE — PROGRESS NOTE ADULT - SUBJECTIVE AND OBJECTIVE BOX
Push enteroscopy to the jejunum:  Retained food in stomach  otherwise normal.    Rec:  CT enterography

## 2021-11-29 NOTE — DISCHARGE NOTE PROVIDER - NSDCMRMEDTOKEN_GEN_ALL_CORE_FT
acetaminophen 325 mg oral tablet: 2 tab(s) orally every 6 hours, As needed, Temp greater or equal to 38C (100.4F), Mild Pain (1 - 3)  aspirin 81 mg oral tablet: 1 tab(s) orally once a day  dronedarone 400 mg oral tablet: 1 tab(s) orally 2 times a day  famotidine 20 mg oral tablet: 1 tab(s) orally once a day  Flonase 50 mcg/inh nasal spray: 1 spray(s) nasal once a day  gemfibrozil 600 mg oral tablet: 1 tab(s) orally 2 times a day  lisinopril 10 mg oral tablet: 1 tab(s) orally once a day  montelukast 10 mg oral tablet: 1 tab(s) orally once a day  Multiple Vitamins oral tablet: 1 tab(s) orally once a day  rosuvastatin 20 mg oral tablet: 1 tab(s) orally once a day  tamsulosin 0.4 mg oral capsule: 1 cap(s) orally once a day  Xarelto 20 mg oral tablet: 1 tab(s) orally once a day  zolpidem 12.5 mg oral tablet, extended release: 1 tab(s) orally once a day (at bedtime)  zolpidem 5 mg oral tablet: 1 tab(s) orally once a day (at bedtime), As needed, Insomnia   acetaminophen 325 mg oral tablet: 2 tab(s) orally every 6 hours, As needed, Temp greater or equal to 38C (100.4F), Mild Pain (1 - 3)  apixaban 5 mg oral tablet: 1 tab(s) orally every 12 hours  dronedarone 400 mg oral tablet: 1 tab(s) orally 2 times a day  famotidine 20 mg oral tablet: 1 tab(s) orally once a day  Flonase 50 mcg/inh nasal spray: 1 spray(s) nasal once a day  gemfibrozil 600 mg oral tablet: 1 tab(s) orally 2 times a day  lisinopril 10 mg oral tablet: 1 tab(s) orally once a day  montelukast 10 mg oral tablet: 1 tab(s) orally once a day  Multiple Vitamins oral tablet: 1 tab(s) orally once a day  pantoprazole 40 mg oral delayed release tablet: 1 tab(s) orally once a day (before a meal)  rosuvastatin 20 mg oral tablet: 1 tab(s) orally once a day  tamsulosin 0.4 mg oral capsule: 1 cap(s) orally once a day  zolpidem 5 mg oral tablet: 1 tab(s) orally once a day (at bedtime), As needed, Insomnia

## 2021-11-29 NOTE — DISCHARGE NOTE PROVIDER - HOSPITAL COURSE
70 year old man with HTN, HLD, CAD, afib s/p ablation, on Xarelto, developed melena 6 weeks ago, s/p recent EGD, capsule enteroscopy, colonoscopy in early November with no bleeding source found, now presented 11/27 with dizziness for two days, dark stools, possibly melanotic. In the ED, Hgb was 6.0. Hemodynamics were WNL. He was ordered for 2u PRBc and admitted to Medicine with GI consult.     #Acute anemia, presumably due to recurrent GI bleeding  Patient with on and off dark stools with past several weeks, he underwent an evaluation at [place] with upper endoscopy and capsule endoscopy recently that was reportedly negative, then had a colonoscopy last week with Dr. Mejia normal aside from a small hyperplastic polyp that was resected.  The patient continues to have dark stools on and off while taking Xarelto for afib. Hgb 6 in ED. Now s/p 2u PRBC with repeat Hgb 8.7. Xarelto held since admission. No further melena today. No further dizziness.   Serial CBC  s/p Upper endoscopy with push enteroscopy wnl  F/u CT enterography   PPI daily  Continue to hold Xarelto for now    Afib  NSR, rate WNL, occ PACs. On dronedarone. Xarelto held. Appreciate input from Cardiology Dr. Rudolph.  Continue dronedarone  Continue to hold Xarelto for now    HTN, HLD, CAD  BP stable. No angina or CHF sx. Trop neg. No ischemia on EKG.  Continue lisinopril  Continue statin, gemfibrozil    DVT ppx  Continue to hold Xarelto for now    Dispo: pending CT, repeat H&H.     Dispo: discharge to *HOME* in stable condition    Final diagnosis, treatment plan, and follow-up recommendations were discussed and explained to the patient. The patient was given an opportunity to ask questions concerning the diagnosis and treatment plan. The patient acknowledged understanding of the diagnosis, treatment, and follow-up recommendations. The patient was advised to seek urgent care upon discharge if worsening symptoms develop prior to scheduled follow-up. Time spent on discharge included time with the patient, and also coordinating discharge care as outlined below.    Total time spent: 50 min   SEE FULL PROGRESS NOTE FROM 11/29:    ASSESSMENT AND PLAN:    70 year old man with HTN, HLD, CAD, afib s/p ablation, on Xarelto, developed melena 6 weeks ago, s/p recent EGD, capsule enteroscopy, colonoscopy in early November with no bleeding source found, now presented 11/27 with dizziness for two days, dark stools, possibly melanotic. In the ED, Hgb was 6.0. Hemodynamics were WNL. He was ordered for 2u PRBc and admitted to Medicine with GI consult.     #Acute anemia, presumably due to recurrent GI bleeding  Patient with on and off dark stools with past several weeks, he underwent an evaluation at [place] with upper endoscopy and capsule endoscopy recently that was reportedly negative, then had a colonoscopy last week with Dr. Jackie cintron aside from a small hyperplastic polyp that was resected.  The patient continues to have dark stools on and off while taking Xarelto for afib. Hgb 6 in ED. Now s/p 2u PRBC with repeat Hgb 8.7. Xarelto held since admission. No further melena today. No further dizziness.   Serial CBC  s/p Upper endoscopy with push enteroscopy wnl  F/u CT enterography   PPI daily  Continue to hold Xarelto for now    Afib  NSR, rate WNL, occ PACs. On dronedarone. Xarelto held. Appreciate input from Cardiology Dr. Rudolph.  Continue dronedarone  Continue to hold Xarelto for now    HTN, HLD, CAD  BP stable. No angina or CHF sx. Trop neg. No ischemia on EKG.  Continue lisinopril  Continue statin, gemfibrozil    DVT ppx  Continue to hold Xarelto for now    Dispo: pending CT, repeat H&H.     Dispo: discharge to *HOME* in stable condition    Final diagnosis, treatment plan, and follow-up recommendations were discussed and explained to the patient. The patient was given an opportunity to ask questions concerning the diagnosis and treatment plan. The patient acknowledged understanding of the diagnosis, treatment, and follow-up recommendations. The patient was advised to seek urgent care upon discharge if worsening symptoms develop prior to scheduled follow-up. Time spent on discharge included time with the patient, and also coordinating discharge care as outlined below.    Total time spent: 50 min   SEE FULL PROGRESS NOTE FROM 12/1/21:    SUMMARY: 70 year old man with HTN, HLD, CAD, afib s/p ablation, on Xarelto, developed melena 6 weeks ago, s/p recent EGD, capsule enteroscopy, colonoscopy in early November with no bleeding source found, now presented 11/27 with dizziness for two days, dark stools, possibly melanotic. In the ED, Hgb was 6.0. Hemodynamics were WNL. He was ordered for 2u PRBc and admitted to Medicine with GI consult. Patient had continual workup up here at  - underwent a Upper endoscopy with push enteroscopy and CT enterography that were wnl. Xarelto switched to Eliquis due to lower bleeding risk. H&H remains stable. Patient to [facility] f/u with Dr. Cabrales for outpatient management and Watchman's evaluation.     ASSESSMENT AND PLAN:  #Acute anemia, presumably due to recurrent GI bleeding  Patient with on and off dark stools with past several weeks, he underwent an evaluation at [place] with upper endoscopy and capsule endoscopy recently that was reportedly negative, then had a colonoscopy last week with Dr. Mejia normal aside from a small hyperplastic polyp that was resected.  The patient continues to have dark stools on and off while taking Xarelto for afib. Hgb 6 in ED. Now s/p 2u PRBC with repeat Hgb 8.7. Xarelto held since admission. No further melena today. No further dizziness.   Serial CBC  s/p Upper endoscopy with push enteroscopy wnl  CT enterography wnl.  PPI daily (new)  Switch Xarelto to Eliquis - Studies suggest Eliquis w/ lower bleeding risk. will switch and monitor H&H     Afib s/p ablation  currently NSR.  Continue dronedarone  Eliquis for stroke ppx    HTN, HLD, CAD  BP stable. No angina or CHF sx. Trop neg. No ischemia on EKG.  Continue lisinopril - dose reduced to 10mg  Continue statin, gemfibrozil    DVT ppx  Eliquis    Dispo: discharge to *HOME* in stable condition. Patient to f/u with H/H checks outpatient which to be arranged by GI    Final diagnosis, treatment plan, and follow-up recommendations were discussed and explained to the patient. The patient was given an opportunity to ask questions concerning the diagnosis and treatment plan. The patient acknowledged understanding of the diagnosis, treatment, and follow-up recommendations. The patient was advised to seek urgent care upon discharge if worsening symptoms develop prior to scheduled follow-up. Time spent on discharge included time with the patient, and also coordinating discharge care as outlined below.    Total time spent: 50 min   SEE FULL PROGRESS NOTE FROM 12/1/21:    SUMMARY: 70 year old man with HTN, HLD, CAD, afib s/p ablation, on Xarelto, developed melena 6 weeks ago, s/p recent EGD, capsule enteroscopy, colonoscopy in early November with no bleeding source found, now presented 11/27 with dizziness for two days, dark stools, possibly melanotic. In the ED, Hgb was 6.0. Hemodynamics were WNL. He was ordered for 2u PRBc and admitted to Medicine with GI consult. Patient had continual workup up here at  - underwent a Upper endoscopy with push enteroscopy and CT enterography that were wnl. Xarelto switched to Eliquis due to lower bleeding risk. H&H remains stable. [facility]u with Dr. Cabrales for outpatient management and Watchman's evaluation.     ASSESSMENT AND PLAN:  #Acute anemia, presumably due to recurrent GI bleeding  Patient with on and off dark stools with past several weeks, he underwent an evaluation at [place] with upper endoscopy and capsule endoscopy recently that was reportedly negative, then had a colonoscopy last week with Dr. Mejia normal aside from a small hyperplastic polyp that was resected.  The patient continues to have dark stools on and off while taking Xarelto for afib. Hgb 6 in ED. Now s/p 2u PRBC with repeat Hgb 8.7. Xarelto held since admission. No further melena today. No further dizziness.   Serial CBC  s/p Upper endoscopy with push enteroscopy wnl  CT enterography wnl.  PPI daily (new)  Switch Xarelto to Eliquis - Studies suggest Eliquis w/ lower bleeding risk. will switch and monitor H&H     Afib s/p ablation  currently NSR.  Continue dronedarone  Eliquis for stroke ppx    HTN, HLD, CAD  BP stable. No angina or CHF sx. Trop neg. No ischemia on EKG.  Continue lisinopril - dose reduced to 10mg  Continue statin, gemfibrozil    DVT ppx  Eliquis    *Patient Received flu vaccine prior to admission.    Dispo: discharge to *HOME* in stable condition. Patient to f/u with H/H checks outpatient which to be arranged by GI - patient will call to arrange labs prior to d/c.     Final diagnosis, treatment plan, and follow-up recommendations were discussed and explained to the patient. The patient was given an opportunity to ask questions concerning the diagnosis and treatment plan. The patient acknowledged understanding of the diagnosis, treatment, and follow-up recommendations. The patient was advised to seek urgent care upon discharge if worsening symptoms develop prior to scheduled follow-up. Time spent on discharge included time with the patient, and also coordinating discharge care as outlined below.    Total time spent: 50 min   SEE FULL PROGRESS NOTE FROM 12/1/21:    SUMMARY: 70 year old man with HTN, HLD, CAD, afib s/p ablation, on Xarelto, developed melena 6 weeks ago, s/p recent EGD, capsule enteroscopy, colonoscopy in early November with no bleeding source found, now presented 11/27 with dizziness for two days, dark stools, possibly melanotic. In the ED, Hgb was 6.0. Hemodynamics were WNL. He was ordered for 2u PRBc and admitted to Medicine with GI consult. Patient had continual workup up here at  - underwent a Upper endoscopy with push enteroscopy and CT enterography that were wnl. Xarelto switched to Eliquis due to lower bleeding risk. H&H remains stable. Patient to [facility] f/u with Dr. Cabrales for outpatient management and Watchman's evaluation.     ASSESSMENT AND PLAN:  #Acute anemia, presumably due to recurrent GI bleeding  Patient with on and off dark stools with past several weeks, he underwent an evaluation at [place] with upper endoscopy and capsule endoscopy recently that was reportedly negative, then had a colonoscopy last week with Dr. Mejia normal aside from a small hyperplastic polyp that was resected.  The patient continues to have dark stools on and off while taking Xarelto for afib. Hgb 6 in ED. Now s/p 2u PRBC with repeat Hgb 8.7. Xarelto held since admission. No further melena today. No further dizziness.   Serial CBC  s/p Upper endoscopy with push enteroscopy wnl  CT enterography wnl.  PPI daily (new)  Switch Xarelto to Eliquis - Studies suggest Eliquis w/ lower bleeding risk. will switch and monitor H&H     Afib s/p ablation  currently NSR.  Continue dronedarone  Eliquis for stroke ppx    HTN, HLD, CAD  BP stable. No angina or CHF sx. Trop neg. No ischemia on EKG.  Continue lisinopril - dose reduced to 10mg  Continue statin, gemfibrozil    DVT ppx  Eliquis    *Patient Received flu vaccine prior to admission*  *Updated Dr. Franks office spoke w/ Octavia. Woarm hand off given*    Dispo: discharge to *HOME* in stable condition. Patient to f/u with H/H checks outpatient which to be arranged by GI - patient will call to arrange labs prior to d/c.     Final diagnosis, treatment plan, and follow-up recommendations were discussed and explained to the patient. The patient was given an opportunity to ask questions concerning the diagnosis and treatment plan. The patient acknowledged understanding of the diagnosis, treatment, and follow-up recommendations. The patient was advised to seek urgent care upon discharge if worsening symptoms develop prior to scheduled follow-up. Time spent on discharge included time with the patient, and also coordinating discharge care as outlined below.    Total time spent: 50 min   SEE FULL PROGRESS NOTE FROM 12/1/21:    SUMMARY: 70 year old man with HTN, HLD, CAD, afib s/p ablation, on Xarelto, developed melena 6 weeks ago, s/p recent EGD, capsule enteroscopy, colonoscopy in early November with no bleeding source found, now presented 11/27 with dizziness for two days, dark stools, possibly melanotic. In the ED, Hgb was 6.0. Hemodynamics were WNL. He was ordered for 2u PRBc and admitted to Medicine with GI consult. Patient had continual workup up here at  - underwent a Upper endoscopy with push enteroscopy and CT enterography that were wnl. Xarelto switched to Eliquis due to lower bleeding risk. H&H remains stable. Patient to [facility] f/u with Dr. Cabrales for outpatient management and Watchman's evaluation.     ASSESSMENT AND PLAN:  #Acute anemia, presumably due to recurrent GI bleeding  Patient with on and off dark stools with past several weeks, he underwent an evaluation at [place] with upper endoscopy and capsule endoscopy recently that was reportedly negative, then had a colonoscopy last week with Dr. Mejia normal aside from a small hyperplastic polyp that was resected.  The patient continues to have dark stools on and off while taking Xarelto for afib. Hgb 6 in ED. Now s/p 2u PRBC with repeat Hgb 8.7. Xarelto held since admission. No further melena today. No further dizziness.   Serial CBC  s/p Upper endoscopy with push enteroscopy wnl  CT enterography wnl.  PPI daily (new)  Switch Xarelto to Eliquis - Studies suggest Eliquis w/ lower bleeding risk. will switch and monitor H&H     Afib s/p ablation  currently NSR.  Continue dronedarone  Eliquis for stroke ppx    HTN, HLD, CAD  BP stable. No angina or CHF sx. Trop neg. No ischemia on EKG.  Continue lisinopril - dose reduced to 10mg  Continue statin, gemfibrozil    DVT ppx  Eliquis    *Patient Received flu vaccine prior to admission*  *Updated Dr. Franks office spoke w/ Octavia. Warm hand off given*    Dispo: discharge to *HOME* in stable condition. Patient to f/u with H/H checks outpatient which to be arranged by GI - patient will call to arrange labs prior to d/c.     Final diagnosis, treatment plan, and follow-up recommendations were discussed and explained to the patient. The patient was given an opportunity to ask questions concerning the diagnosis and treatment plan. The patient acknowledged understanding of the diagnosis, treatment, and follow-up recommendations. The patient was advised to seek urgent care upon discharge if worsening symptoms develop prior to scheduled follow-up. Time spent on discharge included time with the patient, and also coordinating discharge care as outlined below.    Total time spent: 50 min   70 year old man with HTN, HLD, CAD, afib s/p ablation, on Xarelto, developed melena 6 weeks ago, s/p recent EGD, capsule enteroscopy, colonoscopy in early November with no bleeding source found, now presented 11/27 with dizziness for two days, dark stools, possibly melanotic. In the ED, Hgb was 6.0. Hemodynamics were WNL. He was ordered for 2u PRBc and admitted to Medicine with GI consult. Patient had continual workup up here at  - underwent a Upper endoscopy with push enteroscopy and CT enterography that were wnl. Xarelto switched to Eliquis due to lower bleeding risk. H&H remains stable. Patient to [facility] f/u with Dr. Cabrales for outpatient management and Watchman's evaluation.     Acute anemia, presumably due to recurrent GI bleeding  Patient with on and off dark stools with past several weeks, he underwent an evaluation at [place] with upper endoscopy and capsule endoscopy recently that was reportedly negative, then had a colonoscopy last week with Dr. Mejia normal aside from a small hyperplastic polyp that was resected.  The patient continues to have dark stools on and off while taking Xarelto for afib. Hgb 6 in ED. Now s/p 2u PRBC with repeat Hgb 8.7. Xarelto held.   s/p Upper endoscopy with push enteroscopy wnl. CT enterography wnl. Switch Xarelto to Eliquis - Studies suggest Eliquis w/ lower bleeding risk. will switch and monitor H&H. PPI daily (new)    Afib s/p ablation  Currently NSR. Continue dronedarone. Eliquis for stroke ppx.    HTN, HLD, CAD  BP stable. No angina or CHF sx. Trop neg. No ischemia on EKG. Continue lisinopril - dose reduced to 10mg. Continue statin, gemfibrozil      *Patient Received flu vaccine prior to admission*  *Updated Dr. Franks office spoke w/ Octavia. Warm hand off given*    Dispo: discharge to *HOME* in stable condition. Patient to f/u with H/H checks outpatient which to be arranged by GI - patient will call to arrange labs prior to d/c.     Final diagnosis, treatment plan, and follow-up recommendations were discussed and explained to the patient. The patient was given an opportunity to ask questions concerning the diagnosis and treatment plan. The patient acknowledged understanding of the diagnosis, treatment, and follow-up recommendations. The patient was advised to seek urgent care upon discharge if worsening symptoms develop prior to scheduled follow-up. Time spent on discharge included time with the patient, and also coordinating discharge care as outlined below.    Total time spent: 50 min    Patient seen and examined, agree with above assessment and plan. Stable for DC home. On Eliquis. Close outpatient follow up with [facility] cardiac team to perform Watchman in most timely manner possible.

## 2021-11-29 NOTE — CONSULT NOTE ADULT - ASSESSMENT
70-year-old male with A. fib on anticoagulation presents with recurrent GI bleeding, dark stools, anemia.  Recently had upper endoscopy, capsule endoscopy, colonoscopy without a source for bleeding found.  History still favors an upper GI bleed or small bowel bleed.  Currently off and coagulation due to bleeding.    Recommendations:  -Trend CBC  -PPI  -Recommend upper endoscopy with push enteroscopy tomorrow with Dr. Mejia as next step  -NPO p MN  -Risks benefits and alternatives discussed with patient who is in agreement  -Continue to hold Xarelto for now  -Dr. Mejia to resume care in AM  
Melena- GI bleed- recurrent- s/p EGD with no evident source.  FOr CT enteroscopy.  in the past no etiology found.  He seems to be at recurrent risk on Xarelto.  will d/w Dr Mejia - placed call.  Based on CT results if no source found he may be on PPI and proceed with Watchman device  He will need AC for a month after Watchman implant.  Pt follows with Dr Hudson for EP at [facility].    Anxiety- prn xanax.    Afib- no recurrence s/p ablation.  xarelto on hold now after severe GI bleed.  continue multaq.  Last ischemic workup was recent and neg.    Other medical issues- Management per primary team.   Thank you for allowing me to participate in the care of this patient. Please feel free to contact me with any questions. 
The patient is a 71 yo male with Hx. of  CAD, Atrial fib. s/p Ablation, on Xarelto, anemia , developed melena 6 weeks ago, s/p recent EGD, capsule enteroscopy, Colonoscopy on 11/4/21 no bleeding source found,   presented in the ED for dizziness,  for 2 days and worse today. His Bp was low this am. He was planing to have Watchman device placed at [facility] to be able to stop AC for atrial fib.   11/28- still has noted dark stools this AM . denies lightheadness, no CP or SOB     CHADs VASC is only 2 , at this point risk of AC outweighs benifit   1) GI f/u to look for source   2) hold DOAc for now   3) cont multaq seems to be mainly in NSR on tele with [facility] PACs , brief atach , can conisder perhaps tikosyn of sotalol to try to maintain NSR better while here.   4) transfuse as needed

## 2021-11-29 NOTE — PROGRESS NOTE ADULT - ASSESSMENT
70 year old man with HTN, HLD, CAD, afib s/p ablation, on Xarelto, developed melena 6 weeks ago, s/p recent EGD, capsule enteroscopy, colonoscopy in early November with no bleeding source found, now presented 11/27 with dizziness for two days, dark stools, possibly melanotic. In the ED, Hgb was 6.0. Hemodynamics were WNL. He was ordered for 2u PRBc and admitted to Medicine with GI consult.     #Acute anemia, presumably due to recurrent GI bleeding  Patient with on and off dark stools with past several weeks, he underwent an evaluation at [facility] with upper endoscopy and capsule endoscopy recently that was reportedly negative, then had a colonoscopy last week with Dr. Mejia normal aside from a small hyperplastic polyp that was resected.  The patient continues to have dark stools on and off while taking Xarelto for afib. Hgb 6 in ED. Now s/p 2u PRBC with repeat Hgb 8.7. Xarelto held since admission. No further melena today. No further dizziness.   Serial CBC  s/p Upper endoscopy with push enteroscopy wnl  f/u Ct enteroscopy  PPI daily  Continue to hold Xarelto for now    Afib  NSR, rate WNL, occ PACs. On dronedarone. Xarelto held. Appreciate input from Cardiology Dr. Rudolph.  Continue dronedarone  Continue to hold Xarelto for now    HTN, HLD, CAD  BP stable. No angina or CHF sx. Trop neg. No ischemia on EKG.  Continue lisinopril  Continue statin, gemfibrozil    DVT ppx  Continue to hold Xarelto for now   70 year old man with HTN, HLD, CAD, afib s/p ablation, on Xarelto, developed melena 6 weeks ago, s/p recent EGD, capsule enteroscopy, colonoscopy in early November with no bleeding source found, now presented 11/27 with dizziness for two days, dark stools, possibly melanotic. In the ED, Hgb was 6.0. Hemodynamics were WNL. He was ordered for 2u PRBc and admitted to Medicine with GI consult.     #Acute anemia, presumably due to recurrent GI bleeding  Patient with on and off dark stools with past several weeks, he underwent an evaluation at [place] with upper endoscopy and capsule endoscopy recently that was reportedly negative, then had a colonoscopy last week with Dr. Mejia normal aside from a small hyperplastic polyp that was resected.  The patient continues to have dark stools on and off while taking Xarelto for afib. Hgb 6 in ED. Now s/p 2u PRBC with repeat Hgb 8.7. Xarelto held since admission. No further melena today. No further dizziness.   Serial CBC  s/p Upper endoscopy with push enteroscopy wnl  F/u CT enterography   PPI daily  Continue to hold Xarelto for now    Afib  NSR, rate WNL, occ PACs. On dronedarone. Xarelto held. Appreciate input from Cardiology Dr. Rudolph.  Continue dronedarone  Continue to hold Xarelto for now    HTN, HLD, CAD  BP stable. No angina or CHF sx. Trop neg. No ischemia on EKG.  Continue lisinopril  Continue statin, gemfibrozil    DVT ppx  Continue to hold Xarelto for now    Dispo: pending CT, repeat H&H.    70 year old man with HTN, HLD, CAD, afib s/p ablation, on Xarelto, developed melena 6 weeks ago, s/p recent EGD, capsule enteroscopy, colonoscopy in early November with no bleeding source found, now presented 11/27 with dizziness for two days, dark stools, possibly melanotic. In the ED, Hgb was 6.0. Hemodynamics were WNL. He was ordered for 2u PRBc and admitted to Medicine with GI consult.     #Acute anemia, presumably due to recurrent GI bleeding  Patient with on and off dark stools with past several weeks, he underwent an evaluation at [place] with upper endoscopy and capsule endoscopy recently that was reportedly negative, then had a colonoscopy last week with Dr. Mejia normal aside from a small hyperplastic polyp that was resected.  The patient continues to have dark stools on and off while taking Xarelto for afib. Hgb 6 in ED. Now s/p 2u PRBC with repeat Hgb 8.7. Xarelto held since admission. No further melena today. No further dizziness.   Serial CBC  s/p Upper endoscopy with push enteroscopy wnl  CT enterography wnl.  PPI daily  Resume Xarelto d/w cardio. f/u H&H in AM    Afib s/p ablation  currently NSR.  Continue dronedarone  Xarelto resumed for stroke ppx    HTN, HLD, CAD  BP stable. No angina or CHF sx. Trop neg. No ischemia on EKG.  Continue lisinopril - dose reduced to 10mg  Continue statin, gemfibrozil    DVT ppx  Xarelto.    D/c planning in AM. Xarelto resumed tonight. Case d/w Cardio. repeat labs in AM.

## 2021-11-29 NOTE — DISCHARGE NOTE PROVIDER - PROVIDER TOKENS
PROVIDER:[TOKEN:[[id number]]],PROVIDER:[TOKEN:[[id number]]] PROVIDER:[TOKEN:[[id number]]],PROVIDER:[TOKEN:[[id number]]],PROVIDER:[TOKEN:[[id number]]]

## 2021-11-29 NOTE — PROGRESS NOTE ADULT - SUBJECTIVE AND OBJECTIVE BOX
CHIEF COMPLAINT/DIAGNOSIS: dizziness     HPI: 71 yo male with Hx. of  CAD, Atrial fib. s/p Ablation, on Xarelto, anemia , developed melena 6 weeks ago, s/p recent EGD, capsule enteroscopy, Colonoscopy on 11/4/21 no bleeding source found,   presented in the ED for dizziness,  for 2 days and worse today. His Bp was low this am. He was planing to have Watchman device placed at [place] to be able to stop AC for atrial fib.    11/29/21:  All other review of systems is negative unless indicated above    PHYSICAL EXAM:  Constitutional: NAD, awake and alert  HEENT: PERR, EOMI, Normal Hearing, MMM  Neck: Soft and supple, No LAD, No JVD  Respiratory: Breath sounds are clear bilaterally, No wheezing, rales or rhonchi  Cardiovascular: S1 and S2, regular rate and rhythm, no Murmurs, gallops or rubs  Gastrointestinal: Bowel Sounds present, soft, nontender, nondistended, no guarding, no rebound  Extremities: No peripheral edema  Vascular: 2+ peripheral pulses  Neurological: A/O x 3, no focal deficits  Musculoskeletal: 5/5 strength b/l upper and lower extremities  Skin: No rashes    Vital Signs Last 24 Hrs  T(C): 36.8 (29 Nov 2021 06:26), Max: 36.8 (29 Nov 2021 06:26)  T(F): 98.3 (29 Nov 2021 06:26), Max: 98.3 (29 Nov 2021 06:26)  HR: 63 (29 Nov 2021 06:26) (63 - 68)  BP: 107/41 (29 Nov 2021 06:26) (99/42 - 107/41)  BP(mean): --  RR: 18 (29 Nov 2021 06:26) (18 - 18)  SpO2: 98% (29 Nov 2021 06:26) (98% - 99%) ROOM AIR    LABS: All Labs Reviewed:                        8.2    9.16  )-----------( 302      ( 29 Nov 2021 09:52 )             25.2     11-29    137  |  107  |  21  ----------------------------<  105<H>  4.3   |  25  |  0.99    Ca    8.3<L>      29 Nov 2021 09:52  Mg     2.2     11-29    TPro  6.5  /  Alb  2.9<L>  /  TBili  0.3  /  DBili  0.1  /  AST  11<L>  /  ALT  17  /  AlkPhos  99  11-29    PT/INR - ( 27 Nov 2021 12:16 )   PT: 20.7 sec;   INR: 1.84 ratio      PTT - ( 27 Nov 2021 12:16 )  PTT:44.3 sec    UA/Urine Culture: neg    RADIOLOGY:  11/27/21: Xray Chest 1 View: AP radiograph of the chest demonstrates no evidence of infiltrate, pleural effusion or vascular congestion. No atelectasis is seen. The cardiac silhouette is normal in size. Osseous structures are intact. Impression: No active pulmonary disease.    11/29/21: ENDOSCOPY: Normal esophagus. Retained food in the stomach. Normal examined duodenum and jejunum. RECOMMENDATION: Await pathology results. Continue present medications. Patient has a contact number available for emergencies. The signs and symptoms of potential delayed.    MEDICATIONS  (STANDING):  atorvastatin 20 milliGRAM(s) Oral at bedtime  dronedarone 400 milliGRAM(s) Oral two times a day  famotidine    Tablet 20 milliGRAM(s) Oral daily  fluticasone propionate 50 MICROgram(s)/spray Nasal Spray 1 Spray(s) Both Nostrils two times a day  gemfibrozil 600 milliGRAM(s) Oral two times a day  lisinopril 20 milliGRAM(s) Oral daily  montelukast 10 milliGRAM(s) Oral at bedtime  multivitamin 1 Tablet(s) Oral daily  pantoprazole    Tablet 40 milliGRAM(s) Oral before breakfast  tamsulosin 0.4 milliGRAM(s) Oral at bedtime    MEDICATIONS  (PRN):  acetaminophen     Tablet .. 650 milliGRAM(s) Oral every 6 hours PRN Temp greater or equal to 38C (100.4F), Mild Pain (1 - 3)  aluminum hydroxide/magnesium hydroxide/simethicone Suspension 30 milliLiter(s) Oral every 4 hours PRN Dyspepsia  melatonin 3 milliGRAM(s) Oral at bedtime PRN Insomnia  zolpidem 5 milliGRAM(s) Oral at bedtime PRN Insomnia  zolpidem 5 milliGRAM(s) Oral at bedtime PRN Insomnia    Home Medications:  aspirin 81 mg oral tablet: 1 tab(s) orally once a day (27 Nov 2021 16:09)  famotidine 20 mg oral tablet: 1 tab(s) orally once a day (27 Nov 2021 16:09)  Flonase 50 mcg/inh nasal spray: 1 spray(s) nasal once a day (27 Nov 2021 16:09)  Fluad Quadrivalent PF [id number] intramuscular suspension: 0.5 milliliter(s) intramuscular once (27 Nov 2021 16:09)  gemfibrozil 600 mg oral tablet: 1 tab(s) orally 2 times a day (27 Nov 2021 16:09)  lisinopril 20 mg oral tablet: 1 tab(s) orally once a day (27 Nov 2021 16:09)  montelukast 10 mg oral tablet: 1 tab(s) orally once a day (27 Nov 2021 16:09)  Multaq 400 mg oral tablet: 1 tab(s) orally 2 times a day (27 Nov 2021 16:09)  Multiple Vitamins oral tablet: 1 tab(s) orally once a day (27 Nov 2021 16:09)  rosuvastatin 20 mg oral tablet: 1 tab(s) orally once a day (27 Nov 2021 16:09)  tamsulosin 0.4 mg oral capsule: 1 cap(s) orally once a day (27 Nov 2021 16:09)  Xarelto 20 mg oral tablet: 1 tab(s) orally once a day (27 Nov 2021 16:09)  zolpidem 12.5 mg oral tablet, extended release: 1 tab(s) orally once a day (at bedtime) (27 Nov 2021 16:09)    TELEMETRY REVIEW:  11/29/21: CHIEF COMPLAINT/DIAGNOSIS: dizziness     HPI: 69 yo male with Hx. of  CAD, Atrial fib. s/p Ablation, on Xarelto, anemia , developed melena 6 weeks ago, s/p recent EGD, capsule enteroscopy, Colonoscopy on 11/4/21 no bleeding source found,   presented in the ED for dizziness,  for 2 days and worse today. His Bp was low this am. He was planing to have Watchman device placed at [place] to be able to stop AC for atrial fib.    11/29/21: feeling well. no c/o. tolerating PO diet. no further bleeding.  All other review of systems is negative unless indicated above    PHYSICAL EXAM:  Constitutional: NAD, awake and alert  HEENT: PERR, EOMI, Normal Hearing, MMM  Neck: Soft and supple, No LAD, No JVD  Respiratory: Breath sounds are clear bilaterally, No wheezing, rales or rhonchi  Cardiovascular: S1 and S2, regular rate and rhythm, no Murmurs, gallops or rubs  Gastrointestinal: Bowel Sounds present, soft, nontender, nondistended, no guarding, no rebound  Extremities: No peripheral edema  Vascular: 2+ peripheral pulses  Neurological: A/O x 3, no focal deficits  Musculoskeletal: 5/5 strength b/l upper and lower extremities  Skin: No rashes    Vital Signs Last 24 Hrs  T(C): 36.8 (29 Nov 2021 06:26), Max: 36.8 (29 Nov 2021 06:26)  T(F): 98.3 (29 Nov 2021 06:26), Max: 98.3 (29 Nov 2021 06:26)  HR: 63 (29 Nov 2021 06:26) (63 - 68)  BP: 107/41 (29 Nov 2021 06:26) (99/42 - 107/41)  BP(mean): --  RR: 18 (29 Nov 2021 06:26) (18 - 18)  SpO2: 98% (29 Nov 2021 06:26) (98% - 99%) ROOM AIR    LABS: All Labs Reviewed:                        8.2    9.16  )-----------( 302      ( 29 Nov 2021 09:52 )             25.2     11-29    137  |  107  |  21  ----------------------------<  105<H>  4.3   |  25  |  0.99    Ca    8.3<L>      29 Nov 2021 09:52  Mg     2.2     11-29    TPro  6.5  /  Alb  2.9<L>  /  TBili  0.3  /  DBili  0.1  /  AST  11<L>  /  ALT  17  /  AlkPhos  99  11-29    PT/INR - ( 27 Nov 2021 12:16 )   PT: 20.7 sec;   INR: 1.84 ratio      PTT - ( 27 Nov 2021 12:16 )  PTT:44.3 sec    UA/Urine Culture: neg    RADIOLOGY:  11/27/21: Xray Chest 1 View: AP radiograph of the chest demonstrates no evidence of infiltrate, pleural effusion or vascular congestion. No atelectasis is seen. The cardiac silhouette is normal in size. Osseous structures are intact. Impression: No active pulmonary disease.    11/29/21: ENDOSCOPY: Normal esophagus. Retained food in the stomach. Normal examined duodenum and jejunum. RECOMMENDATION: Await pathology results. Continue present medications. Patient has a contact number available for emergencies. The signs and symptoms of potential delayed.    MEDICATIONS  (STANDING):  atorvastatin 20 milliGRAM(s) Oral at bedtime  dronedarone 400 milliGRAM(s) Oral two times a day  famotidine    Tablet 20 milliGRAM(s) Oral daily  fluticasone propionate 50 MICROgram(s)/spray Nasal Spray 1 Spray(s) Both Nostrils two times a day  gemfibrozil 600 milliGRAM(s) Oral two times a day  lisinopril 20 milliGRAM(s) Oral daily  montelukast 10 milliGRAM(s) Oral at bedtime  multivitamin 1 Tablet(s) Oral daily  pantoprazole    Tablet 40 milliGRAM(s) Oral before breakfast  tamsulosin 0.4 milliGRAM(s) Oral at bedtime    MEDICATIONS  (PRN):  acetaminophen     Tablet .. 650 milliGRAM(s) Oral every 6 hours PRN Temp greater or equal to 38C (100.4F), Mild Pain (1 - 3)  aluminum hydroxide/magnesium hydroxide/simethicone Suspension 30 milliLiter(s) Oral every 4 hours PRN Dyspepsia  melatonin 3 milliGRAM(s) Oral at bedtime PRN Insomnia  zolpidem 5 milliGRAM(s) Oral at bedtime PRN Insomnia  zolpidem 5 milliGRAM(s) Oral at bedtime PRN Insomnia    Home Medications:  aspirin 81 mg oral tablet: 1 tab(s) orally once a day (27 Nov 2021 16:09)  famotidine 20 mg oral tablet: 1 tab(s) orally once a day (27 Nov 2021 16:09)  Flonase 50 mcg/inh nasal spray: 1 spray(s) nasal once a day (27 Nov 2021 16:09)  Fluad Quadrivalent PF [id number] intramuscular suspension: 0.5 milliliter(s) intramuscular once (27 Nov 2021 16:09)  gemfibrozil 600 mg oral tablet: 1 tab(s) orally 2 times a day (27 Nov 2021 16:09)  lisinopril 20 mg oral tablet: 1 tab(s) orally once a day (27 Nov 2021 16:09)  montelukast 10 mg oral tablet: 1 tab(s) orally once a day (27 Nov 2021 16:09)  Multaq 400 mg oral tablet: 1 tab(s) orally 2 times a day (27 Nov 2021 16:09)  Multiple Vitamins oral tablet: 1 tab(s) orally once a day (27 Nov 2021 16:09)  rosuvastatin 20 mg oral tablet: 1 tab(s) orally once a day (27 Nov 2021 16:09)  tamsulosin 0.4 mg oral capsule: 1 cap(s) orally once a day (27 Nov 2021 16:09)  Xarelto 20 mg oral tablet: 1 tab(s) orally once a day (27 Nov 2021 16:09)  zolpidem 12.5 mg oral tablet, extended release: 1 tab(s) orally once a day (at bedtime) (27 Nov 2021 16:09)    TELEMETRY REVIEW:  11/29/21: CHIEF COMPLAINT/DIAGNOSIS: dizziness     HPI: 71 yo male with Hx. of  CAD, Atrial fib. s/p Ablation, on Xarelto, anemia , developed melena 6 weeks ago, s/p recent EGD, capsule enteroscopy, Colonoscopy on 11/4/21 no bleeding source found,   presented in the ED for dizziness,  for 2 days and worse today. His Bp was low this am. He was planing to have Watchman device placed at [place] to be able to stop AC for atrial fib.    11/29/21: feeling well. no c/o. tolerating PO diet. no further bleeding.  All other review of systems is negative unless indicated above    PHYSICAL EXAM:  Constitutional: NAD, awake and alert  HEENT: PERR, EOMI, Normal Hearing, MMM  Neck: Soft and supple, No LAD, No JVD  Respiratory: Breath sounds are clear bilaterally, No wheezing, rales or rhonchi  Cardiovascular: S1 and S2, regular rate and rhythm, no Murmurs, gallops or rubs  Gastrointestinal: Bowel Sounds present, soft, nontender, nondistended, no guarding, no rebound  Extremities: No peripheral edema  Vascular: 2+ peripheral pulses  Neurological: A/O x 3, no focal deficits  Musculoskeletal: 5/5 strength b/l upper and lower extremities  Skin: No rashes    Vital Signs Last 24 Hrs  T(C): 36.8 (29 Nov 2021 06:26), Max: 36.8 (29 Nov 2021 06:26)  T(F): 98.3 (29 Nov 2021 06:26), Max: 98.3 (29 Nov 2021 06:26)  HR: 63 (29 Nov 2021 06:26) (63 - 68)  BP: 107/41 (29 Nov 2021 06:26) (99/42 - 107/41)  BP(mean): --  RR: 18 (29 Nov 2021 06:26) (18 - 18)  SpO2: 98% (29 Nov 2021 06:26) (98% - 99%) ROOM AIR    LABS: All Labs Reviewed:                        8.2    9.16  )-----------( 302      ( 29 Nov 2021 09:52 )             25.2     11-29    137  |  107  |  21  ----------------------------<  105<H>  4.3   |  25  |  0.99    Ca    8.3<L>      29 Nov 2021 09:52  Mg     2.2     11-29    TPro  6.5  /  Alb  2.9<L>  /  TBili  0.3  /  DBili  0.1  /  AST  11<L>  /  ALT  17  /  AlkPhos  99  11-29    PT/INR - ( 27 Nov 2021 12:16 )   PT: 20.7 sec;   INR: 1.84 ratio      PTT - ( 27 Nov 2021 12:16 )  PTT:44.3 sec    UA/Urine Culture: neg    RADIOLOGY:  11/27/21: Xray Chest 1 View: AP radiograph of the chest demonstrates no evidence of infiltrate, pleural effusion or vascular congestion. No atelectasis is seen. The cardiac silhouette is normal in size. Osseous structures are intact. Impression: No active pulmonary disease.    11/29/21: ENDOSCOPY: Normal esophagus. Retained food in the stomach. Normal examined duodenum and jejunum. RECOMMENDATION: Await pathology results. Continue present medications. Patient has a contact number available for emergencies. The signs and symptoms of potential delayed.    MEDICATIONS  (STANDING):  atorvastatin 20 milliGRAM(s) Oral at bedtime  dronedarone 400 milliGRAM(s) Oral two times a day  famotidine    Tablet 20 milliGRAM(s) Oral daily  fluticasone propionate 50 MICROgram(s)/spray Nasal Spray 1 Spray(s) Both Nostrils two times a day  gemfibrozil 600 milliGRAM(s) Oral two times a day  lisinopril 20 milliGRAM(s) Oral daily  montelukast 10 milliGRAM(s) Oral at bedtime  multivitamin 1 Tablet(s) Oral daily  pantoprazole    Tablet 40 milliGRAM(s) Oral before breakfast  tamsulosin 0.4 milliGRAM(s) Oral at bedtime    MEDICATIONS  (PRN):  acetaminophen     Tablet .. 650 milliGRAM(s) Oral every 6 hours PRN Temp greater or equal to 38C (100.4F), Mild Pain (1 - 3)  aluminum hydroxide/magnesium hydroxide/simethicone Suspension 30 milliLiter(s) Oral every 4 hours PRN Dyspepsia  melatonin 3 milliGRAM(s) Oral at bedtime PRN Insomnia  zolpidem 5 milliGRAM(s) Oral at bedtime PRN Insomnia  zolpidem 5 milliGRAM(s) Oral at bedtime PRN Insomnia    Home Medications:  aspirin 81 mg oral tablet: 1 tab(s) orally once a day (27 Nov 2021 16:09)  famotidine 20 mg oral tablet: 1 tab(s) orally once a day (27 Nov 2021 16:09)  Flonase 50 mcg/inh nasal spray: 1 spray(s) nasal once a day (27 Nov 2021 16:09)  Fluad Quadrivalent PF [id number] intramuscular suspension: 0.5 milliliter(s) intramuscular once (27 Nov 2021 16:09)  gemfibrozil 600 mg oral tablet: 1 tab(s) orally 2 times a day (27 Nov 2021 16:09)  lisinopril 20 mg oral tablet: 1 tab(s) orally once a day (27 Nov 2021 16:09)  montelukast 10 mg oral tablet: 1 tab(s) orally once a day (27 Nov 2021 16:09)  Multaq 400 mg oral tablet: 1 tab(s) orally 2 times a day (27 Nov 2021 16:09)  Multiple Vitamins oral tablet: 1 tab(s) orally once a day (27 Nov 2021 16:09)  rosuvastatin 20 mg oral tablet: 1 tab(s) orally once a day (27 Nov 2021 16:09)  tamsulosin 0.4 mg oral capsule: 1 cap(s) orally once a day (27 Nov 2021 16:09)  Xarelto 20 mg oral tablet: 1 tab(s) orally once a day (27 Nov 2021 16:09)  zolpidem 12.5 mg oral tablet, extended release: 1 tab(s) orally once a day (at bedtime) (27 Nov 2021 16:09)   CHIEF COMPLAINT/DIAGNOSIS: dizziness     HPI: 71 yo male with Hx. of  CAD, Atrial fib. s/p Ablation, on Xarelto, anemia , developed melena 6 weeks ago, s/p recent EGD, capsule enteroscopy, Colonoscopy on 11/4/21 no bleeding source found,   presented in the ED for dizziness,  for 2 days and worse today. His Bp was low this am. He was planing to have Watchman device placed at [place] to be able to stop AC for atrial fib.    11/29/21: feeling well. no c/o. tolerating PO diet. still w/ black stools  All other review of systems is negative unless indicated above    PHYSICAL EXAM:  Constitutional: NAD, awake and alert  HEENT: PERR, EOMI, Normal Hearing, MMM  Neck: Soft and supple, No LAD, No JVD  Respiratory: Breath sounds are clear bilaterally, No wheezing, rales or rhonchi  Cardiovascular: S1 and S2, regular rate and rhythm, no Murmurs, gallops or rubs  Gastrointestinal: Bowel Sounds present, soft, nontender, nondistended, no guarding, no rebound  Extremities: No peripheral edema  Vascular: 2+ peripheral pulses  Neurological: A/O x 3, no focal deficits  Musculoskeletal: 5/5 strength b/l upper and lower extremities  Skin: No rashes    Vital Signs Last 24 Hrs  T(C): 36.8 (29 Nov 2021 06:26), Max: 36.8 (29 Nov 2021 06:26)  T(F): 98.3 (29 Nov 2021 06:26), Max: 98.3 (29 Nov 2021 06:26)  HR: 63 (29 Nov 2021 06:26) (63 - 68)  BP: 107/41 (29 Nov 2021 06:26) (99/42 - 107/41)  BP(mean): --  RR: 18 (29 Nov 2021 06:26) (18 - 18)  SpO2: 98% (29 Nov 2021 06:26) (98% - 99%) ROOM AIR    LABS: All Labs Reviewed:                        8.2    9.16  )-----------( 302      ( 29 Nov 2021 09:52 )             25.2     11-29    137  |  107  |  21  ----------------------------<  105<H>  4.3   |  25  |  0.99    Ca    8.3<L>      29 Nov 2021 09:52  Mg     2.2     11-29    TPro  6.5  /  Alb  2.9<L>  /  TBili  0.3  /  DBili  0.1  /  AST  11<L>  /  ALT  17  /  AlkPhos  99  11-29    PT/INR - ( 27 Nov 2021 12:16 )   PT: 20.7 sec;   INR: 1.84 ratio      PTT - ( 27 Nov 2021 12:16 )  PTT:44.3 sec    UA/Urine Culture: neg    RADIOLOGY:  11/27/21: Xray Chest 1 View: AP radiograph of the chest demonstrates no evidence of infiltrate, pleural effusion or vascular congestion. No atelectasis is seen. The cardiac silhouette is normal in size. Osseous structures are intact. Impression: No active pulmonary disease.    11/29/21: ENDOSCOPY: Normal esophagus. Retained food in the stomach. Normal examined duodenum and jejunum. RECOMMENDATION: Await pathology results. Continue present medications. Patient has a contact number available for emergencies. The signs and symptoms of potential delayed.    MEDICATIONS  (STANDING):  atorvastatin 20 milliGRAM(s) Oral at bedtime  dronedarone 400 milliGRAM(s) Oral two times a day  famotidine    Tablet 20 milliGRAM(s) Oral daily  fluticasone propionate 50 MICROgram(s)/spray Nasal Spray 1 Spray(s) Both Nostrils two times a day  gemfibrozil 600 milliGRAM(s) Oral two times a day  lisinopril 20 milliGRAM(s) Oral daily  montelukast 10 milliGRAM(s) Oral at bedtime  multivitamin 1 Tablet(s) Oral daily  pantoprazole    Tablet 40 milliGRAM(s) Oral before breakfast  tamsulosin 0.4 milliGRAM(s) Oral at bedtime    MEDICATIONS  (PRN):  acetaminophen     Tablet .. 650 milliGRAM(s) Oral every 6 hours PRN Temp greater or equal to 38C (100.4F), Mild Pain (1 - 3)  aluminum hydroxide/magnesium hydroxide/simethicone Suspension 30 milliLiter(s) Oral every 4 hours PRN Dyspepsia  melatonin 3 milliGRAM(s) Oral at bedtime PRN Insomnia  zolpidem 5 milliGRAM(s) Oral at bedtime PRN Insomnia  zolpidem 5 milliGRAM(s) Oral at bedtime PRN Insomnia    Home Medications:  aspirin 81 mg oral tablet: 1 tab(s) orally once a day (27 Nov 2021 16:09)  famotidine 20 mg oral tablet: 1 tab(s) orally once a day (27 Nov 2021 16:09)  Flonase 50 mcg/inh nasal spray: 1 spray(s) nasal once a day (27 Nov 2021 16:09)  Fluad Quadrivalent PF [id number] intramuscular suspension: 0.5 milliliter(s) intramuscular once (27 Nov 2021 16:09)  gemfibrozil 600 mg oral tablet: 1 tab(s) orally 2 times a day (27 Nov 2021 16:09)  lisinopril 20 mg oral tablet: 1 tab(s) orally once a day (27 Nov 2021 16:09)  montelukast 10 mg oral tablet: 1 tab(s) orally once a day (27 Nov 2021 16:09)  Multaq 400 mg oral tablet: 1 tab(s) orally 2 times a day (27 Nov 2021 16:09)  Multiple Vitamins oral tablet: 1 tab(s) orally once a day (27 Nov 2021 16:09)  rosuvastatin 20 mg oral tablet: 1 tab(s) orally once a day (27 Nov 2021 16:09)  tamsulosin 0.4 mg oral capsule: 1 cap(s) orally once a day (27 Nov 2021 16:09)  Xarelto 20 mg oral tablet: 1 tab(s) orally once a day (27 Nov 2021 16:09)  zolpidem 12.5 mg oral tablet, extended release: 1 tab(s) orally once a day (at bedtime) (27 Nov 2021 16:09)

## 2021-11-29 NOTE — CONSULT NOTE ADULT - REASON FOR ADMISSION
GI bleed, anemia sec. to acute blood loss

## 2021-11-29 NOTE — DISCHARGE NOTE PROVIDER - NSDCCAREPROVSEEN_GEN_ALL_CORE_FT
Mackenzie Carpenter (NP Hospitalist)  Ai Luna (Cardiologist)  Lukasz Mejia (Gastroenterologist)   Jaquelin Miranda (Hospitalist)

## 2021-11-29 NOTE — DISCHARGE NOTE PROVIDER - NSDCCPCAREPLAN_GEN_ALL_CORE_FT
PRINCIPAL DISCHARGE DIAGNOSIS  Diagnosis: GIB (gastrointestinal bleeding)  Assessment and Plan of Treatment: You were admited due to GI (gastrointestinal) bleeding. You had an endoscopy done on 11/29 which was normal.          PRINCIPAL DISCHARGE DIAGNOSIS  Diagnosis: GIB (gastrointestinal bleeding)  Assessment and Plan of Treatment: You were admited due to reccurent GI (gastrointestinal) bleeding. You had an endoscopy with push enteroscopy done on 11/29 which was normal. You had a CT enterography which was normal.   Continue to take Protonix 40mg once a day  Continue Eliquis 5mg twice a day and STOP Xarelto.  STOP Aspirin  Follow up closely with Dr. Cabrales for Watchman device at [facility].  Follow up with Dr. Mejia outpatient for monitoing of your hemoglobin levles - call office to arrange, please obtain labs in 3-5 days after discharge.      SECONDARY DISCHARGE DIAGNOSES  Diagnosis: Atrial fibrillation  Assessment and Plan of Treatment: You had an ablation and currently remain in normal rhythm.  Continue Dronedarone  Continue Eliquis twice a day - STOP Xarelto  Follow up closely with Dr. Cabrales for Watchman device at [facility].    Diagnosis: HTN (hypertension)  Assessment and Plan of Treatment: You have a history of high blood pressure. You blood pressure was low during your hospitalization - Continue Lisinopril 10mg once a day (dose reduced from home dose)  Monitor your blood pressure 1 to 2 times a day and keep a log for your cardiologist.

## 2021-11-29 NOTE — DISCHARGE NOTE PROVIDER - CARE PROVIDER_API CALL
Lukasz Mejia (MD)  Gastroenterology; Internal Medicine  [address] [facility], [address]  [place] [address]  Phone: [phone]  Fax: [phone]  Follow Up Time:     SERGO CAST  Cardiac Electrophysiology  [address] [place] [address]  Phone: [phone]  Fax: [phone]  Follow Up Time:    Lukasz Mejia (MD)  Gastroenterology; Internal Medicine  [address] [facility], [address]  [place] [address]  Phone: [phone]  Fax: [phone]  Follow Up Time:     SERGO CAST  Cardiac Electrophysiology  [address] [place] [address]  Phone: [phone]  Fax: [phone]  Follow Up Time:     Maico Mckeon  Cardiology  [address] [facility] [address]  [place] [address]  Phone: [phone]  Fax: [phone]  Follow Up Time:

## 2021-11-30 LAB
ANION GAP SERPL CALC-SCNC: 4 MMOL/L — LOW (ref 5–17)
BUN SERPL-MCNC: 21 MG/DL — SIGNIFICANT CHANGE UP (ref 7–23)
CALCIUM SERPL-MCNC: 8.5 MG/DL — SIGNIFICANT CHANGE UP (ref 8.5–10.1)
CHLORIDE SERPL-SCNC: 107 MMOL/L — SIGNIFICANT CHANGE UP (ref 96–108)
CO2 SERPL-SCNC: 26 MMOL/L — SIGNIFICANT CHANGE UP (ref 22–31)
CREAT SERPL-MCNC: 0.98 MG/DL — SIGNIFICANT CHANGE UP (ref 0.5–1.3)
GLUCOSE SERPL-MCNC: 98 MG/DL — SIGNIFICANT CHANGE UP (ref 70–99)
HCT VFR BLD CALC: 25.7 % — LOW (ref 39–50)
HGB BLD-MCNC: 8.1 G/DL — LOW (ref 13–17)
MCHC RBC-ENTMCNC: 28.6 PG — SIGNIFICANT CHANGE UP (ref 27–34)
MCHC RBC-ENTMCNC: 31.5 GM/DL — LOW (ref 32–36)
MCV RBC AUTO: 90.8 FL — SIGNIFICANT CHANGE UP (ref 80–100)
PLATELET # BLD AUTO: 313 K/UL — SIGNIFICANT CHANGE UP (ref 150–400)
POTASSIUM SERPL-MCNC: 4.4 MMOL/L — SIGNIFICANT CHANGE UP (ref 3.5–5.3)
POTASSIUM SERPL-SCNC: 4.4 MMOL/L — SIGNIFICANT CHANGE UP (ref 3.5–5.3)
RBC # BLD: 2.83 M/UL — LOW (ref 4.2–5.8)
RBC # FLD: 15.8 % — HIGH (ref 10.3–14.5)
SODIUM SERPL-SCNC: 137 MMOL/L — SIGNIFICANT CHANGE UP (ref 135–145)
WBC # BLD: 7.12 K/UL — SIGNIFICANT CHANGE UP (ref 3.8–10.5)
WBC # FLD AUTO: 7.12 K/UL — SIGNIFICANT CHANGE UP (ref 3.8–10.5)

## 2021-11-30 PROCEDURE — 99232 SBSQ HOSP IP/OBS MODERATE 35: CPT

## 2021-11-30 RX ORDER — ALPRAZOLAM 0.25 MG
0.25 TABLET ORAL ONCE
Refills: 0 | Status: DISCONTINUED | OUTPATIENT
Start: 2021-11-30 | End: 2021-11-30

## 2021-11-30 RX ORDER — APIXABAN 2.5 MG/1
5 TABLET, FILM COATED ORAL EVERY 12 HOURS
Refills: 0 | Status: DISCONTINUED | OUTPATIENT
Start: 2021-11-30 | End: 2021-12-01

## 2021-11-30 RX ORDER — APIXABAN 2.5 MG/1
1 TABLET, FILM COATED ORAL
Qty: 60 | Refills: 0
Start: 2021-11-30 | End: 2021-12-29

## 2021-11-30 RX ADMIN — DRONEDARONE 400 MILLIGRAM(S): 400 TABLET, FILM COATED ORAL at 08:21

## 2021-11-30 RX ADMIN — DRONEDARONE 400 MILLIGRAM(S): 400 TABLET, FILM COATED ORAL at 21:52

## 2021-11-30 RX ADMIN — FAMOTIDINE 20 MILLIGRAM(S): 10 INJECTION INTRAVENOUS at 21:52

## 2021-11-30 RX ADMIN — TAMSULOSIN HYDROCHLORIDE 0.4 MILLIGRAM(S): 0.4 CAPSULE ORAL at 21:52

## 2021-11-30 RX ADMIN — Medication 0.25 MILLIGRAM(S): at 21:51

## 2021-11-30 RX ADMIN — ZOLPIDEM TARTRATE 5 MILLIGRAM(S): 10 TABLET ORAL at 23:08

## 2021-11-30 RX ADMIN — Medication 30 MILLILITER(S): at 19:42

## 2021-11-30 RX ADMIN — Medication 1 SPRAY(S): at 21:52

## 2021-11-30 RX ADMIN — MONTELUKAST 10 MILLIGRAM(S): 4 TABLET, CHEWABLE ORAL at 21:52

## 2021-11-30 RX ADMIN — Medication 1 TABLET(S): at 08:22

## 2021-11-30 RX ADMIN — Medication 600 MILLIGRAM(S): at 21:52

## 2021-11-30 RX ADMIN — LISINOPRIL 10 MILLIGRAM(S): 2.5 TABLET ORAL at 08:23

## 2021-11-30 RX ADMIN — APIXABAN 5 MILLIGRAM(S): 2.5 TABLET, FILM COATED ORAL at 21:52

## 2021-11-30 RX ADMIN — Medication 600 MILLIGRAM(S): at 08:22

## 2021-11-30 RX ADMIN — Medication 1 SPRAY(S): at 08:22

## 2021-11-30 RX ADMIN — ATORVASTATIN CALCIUM 20 MILLIGRAM(S): 80 TABLET, FILM COATED ORAL at 21:52

## 2021-11-30 RX ADMIN — PANTOPRAZOLE SODIUM 40 MILLIGRAM(S): 20 TABLET, DELAYED RELEASE ORAL at 06:56

## 2021-11-30 NOTE — PROGRESS NOTE ADULT - ASSESSMENT
Melena- GI bleed- recurrent- s/p EGD and CT enteroscopy with no evident source.  Pt c/o melena with last BM last night.   Management per gi team.       Anxiety- prn xanax.    Afib- no recurrence s/p ablation.  xarelto resumed, this am Hgb stable.  continue multaq.  Last ischemic workup was recent and neg.  pt is planned for Watchman device with [facility].    Other medical issues- Management per primary team.   Thank you for allowing me to participate in the care of this patient. Please feel free to contact me with any questions.

## 2021-11-30 NOTE — PROGRESS NOTE ADULT - SUBJECTIVE AND OBJECTIVE BOX
Patient is a 70y old  Male who presents with a chief complaint of GI bleed, anemia sec. to acute blood loss (30 Nov 2021 08:43)      Subective:  Had a few black BMs last night around 7 PM but nothing since  No pain or N/V.    PAST MEDICAL & SURGICAL HISTORY:  HTN (hypertension)    Nephrolithiasis    H/O bronchitis  COVID+ march 2020    Dark stools    2019 novel coronavirus disease (COVID-19)    H/O inguinal hernia repair    H/O cardiac radiofrequency ablation    H/O endoscopy    Chronic neck pain with history of cervical spinal surgery    H/O heart artery stent        MEDICATIONS  (STANDING):  apixaban 5 milliGRAM(s) Oral every 12 hours  atorvastatin 20 milliGRAM(s) Oral at bedtime  dronedarone 400 milliGRAM(s) Oral two times a day  famotidine    Tablet 20 milliGRAM(s) Oral daily  fluticasone propionate 50 MICROgram(s)/spray Nasal Spray 1 Spray(s) Both Nostrils two times a day  gemfibrozil 600 milliGRAM(s) Oral two times a day  lisinopril 10 milliGRAM(s) Oral daily  montelukast 10 milliGRAM(s) Oral at bedtime  multivitamin 1 Tablet(s) Oral daily  pantoprazole    Tablet 40 milliGRAM(s) Oral before breakfast  tamsulosin 0.4 milliGRAM(s) Oral at bedtime    MEDICATIONS  (PRN):  acetaminophen     Tablet .. 650 milliGRAM(s) Oral every 6 hours PRN Temp greater or equal to 38C (100.4F), Mild Pain (1 - 3)  aluminum hydroxide/magnesium hydroxide/simethicone Suspension 30 milliLiter(s) Oral every 4 hours PRN Dyspepsia  melatonin 3 milliGRAM(s) Oral at bedtime PRN Insomnia  zolpidem 5 milliGRAM(s) Oral at bedtime PRN Insomnia  zolpidem 5 milliGRAM(s) Oral at bedtime PRN Insomnia      REVIEW OF SYSTEMS:    RESPIRATORY: No shortness of breath  CARDIOVASCULAR: No chest pain  All other review of systems is negative unless indicated above.    Vital Signs Last 24 Hrs  T(C): 36.4 (30 Nov 2021 07:16), Max: 36.7 (29 Nov 2021 13:26)  T(F): 97.6 (30 Nov 2021 07:16), Max: 98 (29 Nov 2021 13:26)  HR: 91 (30 Nov 2021 07:16) (70 - 91)  BP: 112/69 (30 Nov 2021 07:16) (112/69 - 123/60)  BP(mean): --  RR: 18 (30 Nov 2021 07:16) (14 - 18)  SpO2: 98% (30 Nov 2021 07:16) (98% - 99%)    PHYSICAL EXAM:    Constitutional: NAD, well-developed  Respiratory: CTAB  Cardiovascular: S1 and S2  Gastrointestinal: BS+, soft, NT/ND  Extremities: No peripheral edema  Psychiatric: Normal mood, normal affect    LABS:                        8.1    7.12  )-----------( 313      ( 30 Nov 2021 07:35 )             25.7     11-30    137  |  107  |  21  ----------------------------<  98  4.4   |  26  |  0.98    Ca    8.5      30 Nov 2021 07:35  Phos  3.0     11-29  Mg     2.2     11-29    TPro  6.5  /  Alb  2.9<L>  /  TBili  0.3  /  DBili  0.1  /  AST  11<L>  /  ALT  17  /  AlkPhos  99  11-29      LIVER FUNCTIONS - ( 29 Nov 2021 09:52 )  Alb: 2.9 g/dL / Pro: 6.5 gm/dL / ALK PHOS: 99 U/L / ALT: 17 U/L / AST: 11 U/L / GGT: x             RADIOLOGY & ADDITIONAL STUDIES:

## 2021-11-30 NOTE — PROGRESS NOTE ADULT - ASSESSMENT
70 year old man with HTN, HLD, CAD, afib s/p ablation, on Xarelto, developed melena 6 weeks ago, s/p recent EGD, capsule enteroscopy, colonoscopy in early November with no bleeding source found, now presented 11/27 with dizziness for two days, dark stools, possibly melanotic. In the ED, Hgb was 6.0. Hemodynamics were WNL. He was ordered for 2u PRBc and admitted to Medicine with GI consult.     #Acute anemia, presumably due to recurrent GI bleeding  Patient with on and off dark stools with past several weeks, he underwent an evaluation at [facility] with upper endoscopy and capsule endoscopy recently that was reportedly negative, then had a colonoscopy last week with Dr. Mejia normal aside from a small hyperplastic polyp that was resected.  The patient continues to have dark stools on and off while taking Xarelto for afib. Hgb 6 in ED. Now s/p 2u PRBC with repeat Hgb 8.7. Xarelto held since admission. No further melena today. No further dizziness.   Serial CBC  s/p Upper endoscopy with push enteroscopy wnl  CT enterography wnl.  PPI daily  Resume Xarelto d/w cardio. f/u H&H in AM    Afib s/p ablation  currently NSR.  Continue dronedarone  Xarelto resumed for stroke ppx    HTN, HLD, CAD  BP stable. No angina or CHF sx. Trop neg. No ischemia on EKG.  Continue lisinopril - dose reduced to 10mg  Continue statin, gemfibrozil    DVT ppx  Xarelto.    D/c planning in AM. Xarelto resumed tonight. Case d/w Cardio. repeat labs in AM.   70 year old man with HTN, HLD, CAD, afib s/p ablation, on Xarelto, developed melena 6 weeks ago, s/p recent EGD, capsule enteroscopy, colonoscopy in early November with no bleeding source found, now presented 11/27 with dizziness for two days, dark stools, possibly melanotic. In the ED, Hgb was 6.0. Hemodynamics were WNL. He was ordered for 2u PRBc and admitted to Medicine with GI consult.     #Acute anemia, presumably due to recurrent GI bleeding  Patient with on and off dark stools with past several weeks, he underwent an evaluation at [facility] with upper endoscopy and capsule endoscopy recently that was reportedly negative, then had a colonoscopy last week with Dr. Mejia normal aside from a small hyperplastic polyp that was resected.  The patient continues to have dark stools on and off while taking Xarelto for afib. Hgb 6 in ED. Now s/p 2u PRBC with repeat Hgb 8.7. Xarelto held since admission. No further melena today. No further dizziness.   Serial CBC  s/p Upper endoscopy with push enteroscopy wnl  CT enterography wnl.  PPI daily  Switch Xarelto to Eliquis - Studies suggest Eliquis w/ lower bleeding risk. will switch and monitor H&H     Afib s/p ablation  currently NSR.  Continue dronedarone  Eliquis for stroke ppx    HTN, HLD, CAD  BP stable. No angina or CHF sx. Trop neg. No ischemia on EKG.  Continue lisinopril - dose reduced to 10mg  Continue statin, gemfibrozil    DVT ppx  Eliquis    Case d/w Cardio. repeat labs in AM.  LM for Dr. Mckeon w/ updates and call back number.

## 2021-11-30 NOTE — PROGRESS NOTE ADULT - SUBJECTIVE AND OBJECTIVE BOX
CHIEF COMPLAINT/DIAGNOSIS: dizziness     HPI: 71 yo male with Hx. of  CAD, Atrial fib. s/p Ablation, on Xarelto, anemia , developed melena 6 weeks ago, s/p recent EGD, capsule enteroscopy, Colonoscopy on 11/4/21 no bleeding source found,   presented in the ED for dizziness,  for 2 days and worse today. His Bp was low this am. He was planing to have Watchman device placed at [place] to be able to stop AC for atrial fib.    11/29/21: feeling well. no c/o. tolerating PO diet. still w/ black stools  11/30/21:  All other review of systems is negative unless indicated above    PHYSICAL EXAM:  Constitutional: NAD, awake and alert  HEENT: PERR, EOMI, Normal Hearing, MMM  Neck: Soft and supple, No LAD, No JVD  Respiratory: Breath sounds are clear bilaterally, No wheezing, rales or rhonchi  Cardiovascular: S1 and S2, regular rate and rhythm, no Murmurs, gallops or rubs  Gastrointestinal: Bowel Sounds present, soft, nontender, nondistended, no guarding, no rebound  Extremities: No peripheral edema  Vascular: 2+ peripheral pulses  Neurological: A/O x 3, no focal deficits  Musculoskeletal: 5/5 strength b/l upper and lower extremities  Skin: No rashes    Vital Signs Last 24 Hrs  T(C): 36.4 (30 Nov 2021 07:16), Max: 36.7 (29 Nov 2021 13:26)  T(F): 97.6 (30 Nov 2021 07:16), Max: 98 (29 Nov 2021 13:26)  HR: 91 (30 Nov 2021 07:16) (70 - 91)  BP: 112/69 (30 Nov 2021 07:16) (112/69 - 123/60)  BP(mean): --  RR: 18 (30 Nov 2021 07:16) (14 - 18)  SpO2: 98% (30 Nov 2021 07:16) (98% - 99%) ROOM AIR    LABS: All Labs Reviewed:                        8.2    9.16  )-----------( 302      ( 29 Nov 2021 09:52 )             25.2     11-29    137  |  107  |  21  ----------------------------<  105<H>  4.3   |  25  |  0.99    Ca    8.3<L>      29 Nov 2021 09:52  Mg     2.2     11-29    TPro  6.5  /  Alb  2.9<L>  /  TBili  0.3  /  DBili  0.1  /  AST  11<L>  /  ALT  17  /  AlkPhos  99  11-29    PT/INR - ( 27 Nov 2021 12:16 )   PT: 20.7 sec;   INR: 1.84 ratio      PTT - ( 27 Nov 2021 12:16 )  PTT:44.3 sec    UA/Urine Culture: neg    RADIOLOGY:  11/27/21: Xray Chest 1 View: AP radiograph of the chest demonstrates no evidence of infiltrate, pleural effusion or vascular congestion. No atelectasis is seen. The cardiac silhouette is normal in size. Osseous structures are intact. Impression: No active pulmonary disease.    11/29/21: ENDOSCOPY: Normal esophagus. Retained food in the stomach. Normal examined duodenum and jejunum. RECOMMENDATION: Await pathology results. Continue present medications. Patient has a contact number available for emergencies. The signs and symptoms of potential delayed.    MEDICATIONS  (STANDING):  atorvastatin 20 milliGRAM(s) Oral at bedtime  dronedarone 400 milliGRAM(s) Oral two times a day  famotidine    Tablet 20 milliGRAM(s) Oral daily  fluticasone propionate 50 MICROgram(s)/spray Nasal Spray 1 Spray(s) Both Nostrils two times a day  gemfibrozil 600 milliGRAM(s) Oral two times a day  lisinopril 20 milliGRAM(s) Oral daily  montelukast 10 milliGRAM(s) Oral at bedtime  multivitamin 1 Tablet(s) Oral daily  pantoprazole    Tablet 40 milliGRAM(s) Oral before breakfast  tamsulosin 0.4 milliGRAM(s) Oral at bedtime    MEDICATIONS  (PRN):  acetaminophen     Tablet .. 650 milliGRAM(s) Oral every 6 hours PRN Temp greater or equal to 38C (100.4F), Mild Pain (1 - 3)  aluminum hydroxide/magnesium hydroxide/simethicone Suspension 30 milliLiter(s) Oral every 4 hours PRN Dyspepsia  melatonin 3 milliGRAM(s) Oral at bedtime PRN Insomnia  zolpidem 5 milliGRAM(s) Oral at bedtime PRN Insomnia  zolpidem 5 milliGRAM(s) Oral at bedtime PRN Insomnia    Home Medications:  aspirin 81 mg oral tablet: 1 tab(s) orally once a day (27 Nov 2021 16:09)  famotidine 20 mg oral tablet: 1 tab(s) orally once a day (27 Nov 2021 16:09)  Flonase 50 mcg/inh nasal spray: 1 spray(s) nasal once a day (27 Nov 2021 16:09)  Fluad Quadrivalent PF [id number] intramuscular suspension: 0.5 milliliter(s) intramuscular once (27 Nov 2021 16:09)  gemfibrozil 600 mg oral tablet: 1 tab(s) orally 2 times a day (27 Nov 2021 16:09)  lisinopril 20 mg oral tablet: 1 tab(s) orally once a day (27 Nov 2021 16:09)  montelukast 10 mg oral tablet: 1 tab(s) orally once a day (27 Nov 2021 16:09)  Multaq 400 mg oral tablet: 1 tab(s) orally 2 times a day (27 Nov 2021 16:09)  Multiple Vitamins oral tablet: 1 tab(s) orally once a day (27 Nov 2021 16:09)  rosuvastatin 20 mg oral tablet: 1 tab(s) orally once a day (27 Nov 2021 16:09)  tamsulosin 0.4 mg oral capsule: 1 cap(s) orally once a day (27 Nov 2021 16:09)  Xarelto 20 mg oral tablet: 1 tab(s) orally once a day (27 Nov 2021 16:09)  zolpidem 12.5 mg oral tablet, extended release: 1 tab(s) orally once a day (at bedtime) (27 Nov 2021 16:09)   CHIEF COMPLAINT/DIAGNOSIS: dizziness     HPI: 71 yo male with Hx. of  CAD, Atrial fib. s/p Ablation, on Xarelto, anemia , developed melena 6 weeks ago, s/p recent EGD, capsule enteroscopy, Colonoscopy on 11/4/21 no bleeding source found,   presented in the ED for dizziness,  for 2 days and worse today. His Bp was low this am. He was planing to have Watchman device placed at [place] to be able to stop AC for atrial fib.    11/29/21: feeling well. no c/o. tolerating PO diet. still w/ black stools  11/30/21: still w/ black stools overnight - patient says appeared darker. advised him to stay overnight. switch to Eliquis and trend H&H. LM for dr. White office w/ updates.  All other review of systems is negative unless indicated above    PHYSICAL EXAM:  Constitutional: NAD, awake and alert  HEENT: PERR, EOMI, Normal Hearing, MMM  Neck: Soft and supple, No LAD, No JVD  Respiratory: Breath sounds are clear bilaterally, No wheezing, rales or rhonchi  Cardiovascular: S1 and S2, regular rate and rhythm, no Murmurs, gallops or rubs  Gastrointestinal: Bowel Sounds present, soft, nontender, nondistended, no guarding, no rebound  Extremities: No peripheral edema  Vascular: 2+ peripheral pulses  Neurological: A/O x 3, no focal deficits  Musculoskeletal: 5/5 strength b/l upper and lower extremities  Skin: No rashes    Vital Signs Last 24 Hrs  T(C): 36.4 (30 Nov 2021 07:16), Max: 36.7 (29 Nov 2021 13:26)  T(F): 97.6 (30 Nov 2021 07:16), Max: 98 (29 Nov 2021 13:26)  HR: 91 (30 Nov 2021 07:16) (70 - 91)  BP: 112/69 (30 Nov 2021 07:16) (112/69 - 123/60)  BP(mean): --  RR: 18 (30 Nov 2021 07:16) (14 - 18)  SpO2: 98% (30 Nov 2021 07:16) (98% - 99%) ROOM AIR    LABS: All Labs Reviewed:                        8.1    7.12  )-----------( 313      ( 30 Nov 2021 07:35 )             25.7     11-30    137  |  107  |  21  ----------------------------<  98  4.4   |  26  |  0.98    Ca    8.5      30 Nov 2021 07:35  Phos  3.0     11-29  Mg     2.2     11-29    TPro  6.5  /  Alb  2.9<L>  /  TBili  0.3  /  DBili  0.1  /  AST  11<L>  /  ALT  17  /  AlkPhos  99  11-29    UA/Urine Culture: neg    RADIOLOGY:  11/27/21: Xray Chest 1 View: AP radiograph of the chest demonstrates no evidence of infiltrate, pleural effusion or vascular congestion. No atelectasis is seen. The cardiac silhouette is normal in size. Osseous structures are intact. Impression: No active pulmonary disease.    11/29/21: ENDOSCOPY: Normal esophagus. Retained food in the stomach. Normal examined duodenum and jejunum. RECOMMENDATION: Await pathology results. Continue present medications. Patient has a contact number available for emergencies. The signs and symptoms of potential delayed.    MEDICATIONS  (STANDING):  apixaban 5 milliGRAM(s) Oral every 12 hours  atorvastatin 20 milliGRAM(s) Oral at bedtime  dronedarone 400 milliGRAM(s) Oral two times a day  famotidine    Tablet 20 milliGRAM(s) Oral daily  fluticasone propionate 50 MICROgram(s)/spray Nasal Spray 1 Spray(s) Both Nostrils two times a day  gemfibrozil 600 milliGRAM(s) Oral two times a day  lisinopril 10 milliGRAM(s) Oral daily  montelukast 10 milliGRAM(s) Oral at bedtime  multivitamin 1 Tablet(s) Oral daily  pantoprazole    Tablet 40 milliGRAM(s) Oral before breakfast  tamsulosin 0.4 milliGRAM(s) Oral at bedtime    MEDICATIONS  (PRN):  acetaminophen     Tablet .. 650 milliGRAM(s) Oral every 6 hours PRN Temp greater or equal to 38C (100.4F), Mild Pain (1 - 3)  aluminum hydroxide/magnesium hydroxide/simethicone Suspension 30 milliLiter(s) Oral every 4 hours PRN Dyspepsia  melatonin 3 milliGRAM(s) Oral at bedtime PRN Insomnia  zolpidem 5 milliGRAM(s) Oral at bedtime PRN Insomnia  zolpidem 5 milliGRAM(s) Oral at bedtime PRN Insomnia    Home Medications:  aspirin 81 mg oral tablet: 1 tab(s) orally once a day (27 Nov 2021 16:09)  famotidine 20 mg oral tablet: 1 tab(s) orally once a day (27 Nov 2021 16:09)  Flonase 50 mcg/inh nasal spray: 1 spray(s) nasal once a day (27 Nov 2021 16:09)  Fluad Quadrivalent PF [id number] intramuscular suspension: 0.5 milliliter(s) intramuscular once (27 Nov 2021 16:09)  gemfibrozil 600 mg oral tablet: 1 tab(s) orally 2 times a day (27 Nov 2021 16:09)  lisinopril 20 mg oral tablet: 1 tab(s) orally once a day (27 Nov 2021 16:09)  montelukast 10 mg oral tablet: 1 tab(s) orally once a day (27 Nov 2021 16:09)  Multaq 400 mg oral tablet: 1 tab(s) orally 2 times a day (27 Nov 2021 16:09)  Multiple Vitamins oral tablet: 1 tab(s) orally once a day (27 Nov 2021 16:09)  rosuvastatin 20 mg oral tablet: 1 tab(s) orally once a day (27 Nov 2021 16:09)  tamsulosin 0.4 mg oral capsule: 1 cap(s) orally once a day (27 Nov 2021 16:09)  Xarelto 20 mg oral tablet: 1 tab(s) orally once a day (27 Nov 2021 16:09)  zolpidem 12.5 mg oral tablet, extended release: 1 tab(s) orally once a day (at bedtime) (27 Nov 2021 16:09)

## 2021-11-30 NOTE — PROGRESS NOTE ADULT - SUBJECTIVE AND OBJECTIVE BOX
Patient is a 70y old  Male who presents with a chief complaint of GI bleed, anemia sec. to acute blood loss.      HPI:  HPI: The patient is a 71 yo male with Hx. of  CAD, Atrial fib. s/p Ablation, on Xarelto, anemia , developed melena 6 weeks ago, s/p recent EGD, capsule enteroscopy, Colonoscopy on 21 no bleeding source found,   presented in the ED for dizziness,  for 2 days and worse today. His Bp was low this am. He was planing to have Watchman device placed at [facility] to be able to stop AC for atrial fib.  -   Pt seen this am.  S/p EGD.  Pt denies any CP or SOB.  c/o anxiety and palpitations.   - pt seen this am.  Pt c/o dark stool which is more darker last night.  this am no BM yet.     PMHx: CAD, Atrial fib. s/p Ablation, on Xarelto, anemia, HTN, HLD,     PSHx: AV ablasion ,  C5-6 Laminectomh 8/10/21    Family Hx: Father had CAD/ MI, Mother had diabetes.    Social Hx.: not smoking, no alcohol use      PAST MEDICAL & SURGICAL HISTORY:  HTN (hypertension)    Nephrolithiasis    H/O bronchitis  COVID+ 2020    Dark stools     novel coronavirus disease (COVID-19)    H/O inguinal hernia repair    H/O cardiac radiofrequency ablation    H/O endoscopy    Chronic neck pain with history of cervical spinal surgery    H/O heart artery stent        MEDICATIONS  (STANDING):  atorvastatin 20 milliGRAM(s) Oral at bedtime  dronedarone 400 milliGRAM(s) Oral two times a day  famotidine    Tablet 20 milliGRAM(s) Oral daily  fluticasone propionate 50 MICROgram(s)/spray Nasal Spray 1 Spray(s) Both Nostrils two times a day  gemfibrozil 600 milliGRAM(s) Oral two times a day  lisinopril 20 milliGRAM(s) Oral daily  montelukast 10 milliGRAM(s) Oral at bedtime  multivitamin 1 Tablet(s) Oral daily  pantoprazole    Tablet 40 milliGRAM(s) Oral before breakfast  tamsulosin 0.4 milliGRAM(s) Oral at bedtime    MEDICATIONS  (PRN):  acetaminophen     Tablet .. 650 milliGRAM(s) Oral every 6 hours PRN Temp greater or equal to 38C (100.4F), Mild Pain (1 - 3)  aluminum hydroxide/magnesium hydroxide/simethicone Suspension 30 milliLiter(s) Oral every 4 hours PRN Dyspepsia  melatonin 3 milliGRAM(s) Oral at bedtime PRN Insomnia  zolpidem 5 milliGRAM(s) Oral at bedtime PRN Insomnia  zolpidem 5 milliGRAM(s) Oral at bedtime PRN Insomnia      FAMILY HISTORY:  Patient&#x27;s mother is   and so is father, both from heart disease, father with MI in early 40&#x27;s        SOCIAL HISTORY: no recent smoking     REVIEW OF SYSTEMS:  CONSTITUTIONAL:    No fatigue, malaise, lethargy.  No fever or chills.  RESPIRATORY:  No cough.  No wheeze.  No hemoptysis.  No shortness of breath.  CARDIOVASCULAR:  No chest pains.  No palpitations. No shortness of breath, No orthopnea or PND.  GASTROINTESTINAL:  No abdominal pain.  No nausea or vomiting.   c/o melena   GENITOURINARY:    No hematuria.    MUSCULOSKELETAL:  No musculoskeletal pain.  No joint swelling.  No arthritis.  NEUROLOGICAL:  No tingling or numbness or weakness.  PSYCHIATRIC:  No confusion  SKIN:  No rashes.            Vital Signs Last 24 Hrs  T(C): 36.8 (2021 06:26), Max: 36.8 (2021 06:26)  T(F): 98.3 (2021 06:26), Max: 98.3 (2021 06:26)  HR: 63 (2021 06:26) (63 - 68)  BP: 107/41 (2021 06:26) (99/42 - 107/41)  BP(mean): --  RR: 18 (2021 06:26) (18 - 18)  SpO2: 98% (2021 06:26) (98% - 99%)    PHYSICAL EXAM-    Constitutional:  no acute distress , pale looking     Head: Head is normocephalic and atraumatic.      Neck: No JVD.     Cardiovascular: Regular rate and rhythm without S3, S4. No murmurs or rubs are appreciated.      Respiratory: Breath sounds are normal. No rales. No wheezing.    Abdomen: Soft, nontender, nondistended with positive bowel sounds.      Extremity: No tenderness. No  pitting edema     Neurologic: The patient is alert and oriented.      Skin: No rash, no obvious lesions noted.      Psychiatric: The patient appears to be emotionally stable.      INTERPRETATION OF TELEMETRY: SR, ST     ECG: Sinus rythm ST depression that is <1/2 mm in lateral leads.     I&O's Detail      LABS:                                   8.1    7.12  )-----------( 313      ( 2021 07:35 )             25.7         137  |  107  |  21  ----------------------------<  98  4.4   |  26  |  0.98    Ca    8.5      2021 07:35  Phos  3.0       Mg     2.2         TPro  6.5  /  Alb  2.9<L>  /  TBili  0.3  /  DBili  0.1  /  AST  11<L>  /  ALT  17  /  AlkPhos  99          LIVER FUNCTIONS - ( 2021 09:52 )  Alb: 2.9 g/dL / Pro: 6.5 gm/dL / ALK PHOS: 99 U/L / ALT: 17 U/L / AST: 11 U/L / GGT: x                    PTT - ( 2021 12:16 )  PTT:44.3 sec  Urinalysis Basic - ( 2021 17:10 )    Color: Pale Yellow / Appearance: Clear / S.010 / pH: x  Gluc: x / Ketone: Negative  / Bili: Negative / Urobili: Negative mg/dL   Blood: x / Protein: Negative mg/dL / Nitrite: Negative   Leuk Esterase: Negative / RBC: x / WBC x   Sq Epi: x / Non Sq Epi: x / Bacteria: x      I&O's Summary    BNP  RADIOLOGY & ADDITIONAL STUDIES:  chest< from: CT Enterography w/ Oral Cont and w/ IV Cont (21 @ 11:53) >    EXAM:  CT ENTEROGRAPHY OC IC                            PROCEDURE DATE:  2021          INTERPRETATION:  CLINICAL INFORMATION: Iron deficiency anemia. Guaiac positive.    COMPARISON: Noncontrast CT of the abdomen and pelvis dated 2019.    CONTRAST/COMPLICATIONS:  IV Contrast: Omnipaque 350  90 cc administered   10 cc discarded  Oral Contrast: VoLumen  Complications: None reported at time of study completion    PROCEDURE:  CT of the Abdomen and Pelvis (CT Enterography) was performed with intravenous contrast in the late arterial phase.  Sagittal and coronal reformats were performed.    FINDINGS:  LOWER CHEST: Within normal limits.    LIVER: Within normal limits.  BILE DUCTS: Normal caliber.  GALLBLADDER: Small layering gallstones.  SPLEEN: Within normal limits.  PANCREAS: Within normal limits.  ADRENALS: Within normal limits.  KIDNEYS/URETERS: Bilateral renal cysts. 2 mm nonobstructing stone in the lateral midpole of the right kidney. Otherwise, within normal limits.    BLADDER: Within normal limits.  REPRODUCTIVE ORGANS: The prostate is not enlarged.    BOWEL: The stomach is unremarkable. There is adequate distention of the small bowel with contrast. Small bowel loops are unremarkable with no evidence of obstruction, bowel wall thickening, abnormal fold enhancement, discrete enhancing mucosal lesion, or stranding of the adjacent mesenteric fat. There is no evidence of mural stratification, fistula, or abscess. There is no fibrofatty proliferation of the mesentery. The appendix is unremarkable. There is stool throughout the colon. There is no mesenteric adenopathy. The mesenteric vessels opacify unremarkably.  PERITONEUM: No ascites.  VESSELS: Moderate atherosclerotic disease of the nonaneurysmal abdominal aortaand common iliac arteries.  RETROPERITONEUM/LYMPH NODES: No lymphadenopathy.  ABDOMINAL WALL: Small fat-containing right inguinal hernia.  BONES: Within normal limits.    IMPRESSION: Unremarkable CT enterography.    --- End of Report ---            CAITLYN ESTRADA MD; Attending Radiologist  This document has been electronically signed. 2021  4:26PM    < end of copied text >  < from: Upper Endoscopy (21 @ 07:35) >  IMPRESS Impression:          - Normal esophagus.    IMPRESS       - Retained food in the stomach    IMPRESS                      - Normal examined duodenum and jujunum.    ENDORECOMMENDATION Recommendation:      - Await pathology results.    ENDORECOMMENDATION                      - Continue present medications.    ENDORECOMMENDATION                      - Patient has a contact number available for     ENDORECOMMENDATION                      emergencies. The signs and symptoms of potential delayed     ENDORECOMMENDATION                      complicationswere discussed with the patient. Return to     ENDORECOMMENDATION                      normal activities tomorrow. Written discharge     ENDORECOMMENDATION                      instructions were provided to the patient.    ENDORECOMMENDATION            - Resume previous diet.    SIGNATURENAME Lukasz Mejia MD    SIGNATURENAME Lukasz Mejia MD    SIGNATUREDATE 2021 7:56:23 AM    NUMADDENDA Number of Addenda: 0    INITIATEDON Note Initiated On: 2021 7:35 AM    < end of copied text >

## 2021-11-30 NOTE — PROGRESS NOTE ADULT - ASSESSMENT
Imp:  Recurrent GI bleeding, presumably small bowel  H/H slightly down over last 48 hours although fluctuating    Rec:  Given black stool last night, slight drop in H/H and resumption of eliquis, I think it's reasonable to observe 1 more day  Will ask staff to try to get [facility] records

## 2021-12-01 ENCOUNTER — TRANSCRIPTION ENCOUNTER (OUTPATIENT)
Age: 71
End: 2021-12-01

## 2021-12-01 VITALS
OXYGEN SATURATION: 97 % | RESPIRATION RATE: 18 BRPM | HEART RATE: 62 BPM | TEMPERATURE: 98 F | SYSTOLIC BLOOD PRESSURE: 114 MMHG | DIASTOLIC BLOOD PRESSURE: 63 MMHG

## 2021-12-01 LAB
HCT VFR BLD CALC: 27.5 % — LOW (ref 39–50)
HGB BLD-MCNC: 8.4 G/DL — LOW (ref 13–17)
MCHC RBC-ENTMCNC: 28.2 PG — SIGNIFICANT CHANGE UP (ref 27–34)
MCHC RBC-ENTMCNC: 30.5 GM/DL — LOW (ref 32–36)
MCV RBC AUTO: 92.3 FL — SIGNIFICANT CHANGE UP (ref 80–100)
PLATELET # BLD AUTO: 331 K/UL — SIGNIFICANT CHANGE UP (ref 150–400)
RBC # BLD: 2.98 M/UL — LOW (ref 4.2–5.8)
RBC # FLD: 15.5 % — HIGH (ref 10.3–14.5)
WBC # BLD: 7.73 K/UL — SIGNIFICANT CHANGE UP (ref 3.8–10.5)
WBC # FLD AUTO: 7.73 K/UL — SIGNIFICANT CHANGE UP (ref 3.8–10.5)

## 2021-12-01 PROCEDURE — 99239 HOSP IP/OBS DSCHRG MGMT >30: CPT

## 2021-12-01 RX ORDER — PANTOPRAZOLE SODIUM 20 MG/1
1 TABLET, DELAYED RELEASE ORAL
Qty: 30 | Refills: 0
Start: 2021-12-01 | End: 2021-12-30

## 2021-12-01 RX ORDER — RIVAROXABAN 15 MG-20MG
1 KIT ORAL
Qty: 0 | Refills: 0 | DISCHARGE

## 2021-12-01 RX ORDER — ASPIRIN/CALCIUM CARB/MAGNESIUM 324 MG
1 TABLET ORAL
Qty: 0 | Refills: 0 | DISCHARGE

## 2021-12-01 RX ORDER — ZOLPIDEM TARTRATE 10 MG/1
1 TABLET ORAL
Qty: 0 | Refills: 0 | DISCHARGE

## 2021-12-01 RX ADMIN — DRONEDARONE 400 MILLIGRAM(S): 400 TABLET, FILM COATED ORAL at 09:15

## 2021-12-01 RX ADMIN — LISINOPRIL 10 MILLIGRAM(S): 2.5 TABLET ORAL at 09:21

## 2021-12-01 RX ADMIN — APIXABAN 5 MILLIGRAM(S): 2.5 TABLET, FILM COATED ORAL at 09:21

## 2021-12-01 RX ADMIN — Medication 1 TABLET(S): at 09:20

## 2021-12-01 RX ADMIN — Medication 600 MILLIGRAM(S): at 09:15

## 2021-12-01 RX ADMIN — Medication 1 SPRAY(S): at 09:21

## 2021-12-01 RX ADMIN — PANTOPRAZOLE SODIUM 40 MILLIGRAM(S): 20 TABLET, DELAYED RELEASE ORAL at 06:54

## 2021-12-01 NOTE — PROGRESS NOTE ADULT - SUBJECTIVE AND OBJECTIVE BOX
Patient is a 70y old  Male who presents with a chief complaint of GI bleed, anemia sec. to acute blood loss (01 Dec 2021 07:46)  12/1- no clinical evidence of bleeding     MEDICATIONS  (STANDING):  apixaban 5 milliGRAM(s) Oral every 12 hours  atorvastatin 20 milliGRAM(s) Oral at bedtime  dronedarone 400 milliGRAM(s) Oral two times a day  famotidine    Tablet 20 milliGRAM(s) Oral daily  fluticasone propionate 50 MICROgram(s)/spray Nasal Spray 1 Spray(s) Both Nostrils two times a day  gemfibrozil 600 milliGRAM(s) Oral two times a day  lisinopril 10 milliGRAM(s) Oral daily  montelukast 10 milliGRAM(s) Oral at bedtime  multivitamin 1 Tablet(s) Oral daily  pantoprazole    Tablet 40 milliGRAM(s) Oral before breakfast  tamsulosin 0.4 milliGRAM(s) Oral at bedtime    MEDICATIONS  (PRN):  acetaminophen     Tablet .. 650 milliGRAM(s) Oral every 6 hours PRN Temp greater or equal to 38C (100.4F), Mild Pain (1 - 3)  aluminum hydroxide/magnesium hydroxide/simethicone Suspension 30 milliLiter(s) Oral every 4 hours PRN Dyspepsia  melatonin 3 milliGRAM(s) Oral at bedtime PRN Insomnia  zolpidem 5 milliGRAM(s) Oral at bedtime PRN Insomnia  zolpidem 5 milliGRAM(s) Oral at bedtime PRN Insomnia            Vital Signs Last 24 Hrs  T(C): 36.4 (01 Dec 2021 07:53), Max: 36.4 (30 Nov 2021 20:11)  T(F): 97.6 (01 Dec 2021 07:53), Max: 97.6 (30 Nov 2021 20:11)  HR: 62 (01 Dec 2021 07:53) (62 - 73)  BP: 114/63 (01 Dec 2021 07:53) (114/63 - 152/59)  BP(mean): --  RR: 18 (01 Dec 2021 07:53) (18 - 18)  SpO2: 97% (01 Dec 2021 07:53) (97% - 100%)            INTERPRETATION OF TELEMETRY:    ECG:        LABS:                        8.4    7.73  )-----------( 331      ( 01 Dec 2021 06:54 )             27.5     11-30    137  |  107  |  21  ----------------------------<  98  4.4   |  26  |  0.98    Ca    8.5      30 Nov 2021 07:35  Phos  3.0     11-29  Mg     2.2     11-29    TPro  6.5  /  Alb  2.9<L>  /  TBili  0.3  /  DBili  0.1  /  AST  11<L>  /  ALT  17  /  AlkPhos  99  11-29            I&O's Summary    BNP  RADIOLOGY & ADDITIONAL STUDIES:

## 2021-12-01 NOTE — PROGRESS NOTE ADULT - ASSESSMENT
70 year old man with HTN, HLD, CAD, afib s/p ablation, on Xarelto, developed melena 6 weeks ago, s/p recent EGD, capsule enteroscopy, colonoscopy in early November with no bleeding source found, now presented 11/27 with dizziness for two days, dark stools, possibly melanotic. In the ED, Hgb was 6.0. Hemodynamics were WNL. He was ordered for 2u PRBc and admitted to Medicine with GI consult.     ASSESSMENT AND PLAN:  #Acute anemia, presumably due to recurrent GI bleeding  Patient with on and off dark stools with past several weeks, he underwent an evaluation at [facility] with upper endoscopy and capsule endoscopy recently that was reportedly negative, then had a colonoscopy last week with Dr. Mejia normal aside from a small hyperplastic polyp that was resected.  The patient continues to have dark stools on and off while taking Xarelto for afib. Hgb 6 in ED. Now s/p 2u PRBC with repeat Hgb 8.7. Xarelto held since admission. No further melena today. No further dizziness.   Serial CBC  s/p Upper endoscopy with push enteroscopy wnl  CT enterography wnl.  PPI daily (new)  Switch Xarelto to Eliquis - Studies suggest Eliquis w/ lower bleeding risk. will switch and monitor H&H     Afib s/p ablation  currently NSR.  Continue dronedarone  Eliquis for stroke ppx    HTN, HLD, CAD  BP stable. No angina or CHF sx. Trop neg. No ischemia on EKG.  Continue lisinopril - dose reduced to 10mg  Continue statin, gemfibrozil    DVT ppx  Eliquis    Dispo: discharge to *HOME* in stable condition. Patient to f/u with H/H checks outpatient which to be arranged by GI    Case d/w Cardio. repeat labs in AM.  LM for Dr. Mckeon w/ updates and call back number.   70 year old man with HTN, HLD, CAD, afib s/p ablation, on Xarelto, developed melena 6 weeks ago, s/p recent EGD, capsule enteroscopy, colonoscopy in early November with no bleeding source found, now presented 11/27 with dizziness for two days, dark stools, possibly melanotic. In the ED, Hgb was 6.0. Hemodynamics were WNL. He was ordered for 2u PRBc and admitted to Medicine with GI consult.     ASSESSMENT AND PLAN:  #Acute anemia, presumably due to recurrent GI bleeding  Patient with on and off dark stools with past several weeks, he underwent an evaluation at [facility] with upper endoscopy and capsule endoscopy recently that was reportedly negative, then had a colonoscopy last week with Dr. Mejia normal aside from a small hyperplastic polyp that was resected.  The patient continues to have dark stools on and off while taking Xarelto for afib. Hgb 6 in ED. Now s/p 2u PRBC with repeat Hgb 8.7. Xarelto held since admission. No further melena today. No further dizziness.   Serial CBC  s/p Upper endoscopy with push enteroscopy wnl  CT enterography wnl.  PPI daily (new)  Switch Xarelto to Eliquis - Studies suggest Eliquis w/ lower bleeding risk. will switch and monitor H&H     Afib s/p ablation  currently NSR.  Continue dronedarone  Eliquis for stroke ppx    HTN, HLD, CAD  BP stable. No angina or CHF sx. Trop neg. No ischemia on EKG.  Continue lisinopril - dose reduced to 10mg  Continue statin, gemfibrozil    DVT ppx  Eliquis    Dispo: discharge to *HOME* in stable condition. Patient to f/u with H/H checks outpatient which to be arranged by GI - patient will call prior to leaving to arrange labs.    Case d/w Cardio.    LM for Dr. Mckeon w/ updates and call back number on 11/30

## 2021-12-01 NOTE — PROGRESS NOTE ADULT - SUBJECTIVE AND OBJECTIVE BOX
CHIEF COMPLAINT/DIAGNOSIS: dizziness     HPI: 69 yo male with Hx. of  CAD, Atrial fib. s/p Ablation, on Xarelto, anemia , developed melena 6 weeks ago, s/p recent EGD, capsule enteroscopy, Colonoscopy on 11/4/21 no bleeding source found,   presented in the ED for dizziness,  for 2 days and worse today. His Bp was low this am. He was planing to have Watchman device placed at [place] to be able to stop AC for atrial fib.    11/29/21: feeling well. no c/o. tolerating PO diet. still w/ black stools  11/30/21: still w/ black stools overnight - patient says appeared darker. advised him to stay overnight. switch to Eliquis and trend H&H. LM for dr. White office w/ updates.  12/1/21:    All other review of systems is negative unless indicated above    PHYSICAL EXAM:  Constitutional: NAD, awake and alert  HEENT: PERR, EOMI, Normal Hearing, MMM  Neck: Soft and supple, No LAD, No JVD  Respiratory: Breath sounds are clear bilaterally, No wheezing, rales or rhonchi  Cardiovascular: S1 and S2, regular rate and rhythm, no Murmurs, gallops or rubs  Gastrointestinal: Bowel Sounds present, soft, nontender, nondistended, no guarding, no rebound  Extremities: No peripheral edema  Vascular: 2+ peripheral pulses  Neurological: A/O x 3, no focal deficits  Musculoskeletal: 5/5 strength b/l upper and lower extremities  Skin: No rashes    Vital Signs Last 24 Hrs  T(C): 36.4 (01 Dec 2021 07:53), Max: 36.4 (30 Nov 2021 20:11)  T(F): 97.6 (01 Dec 2021 07:53), Max: 97.6 (30 Nov 2021 20:11)  HR: 62 (01 Dec 2021 07:53) (62 - 73)  BP: 114/63 (01 Dec 2021 07:53) (114/63 - 152/59)  BP(mean): --  RR: 18 (01 Dec 2021 07:53) (18 - 18)  SpO2: 97% (01 Dec 2021 07:53) (97% - 100%) ROOM AIR    LABS: All Labs Reviewed:                        8.4    7.73  )-----------( 331      ( 01 Dec 2021 06:54 )             27.5     11-30    137  |  107  |  21  ----------------------------<  98  4.4   |  26  |  0.98    Ca    8.5      30 Nov 2021 07:35  Phos  3.0     11-29  Mg     2.2     11-29    TPro  6.5  /  Alb  2.9<L>  /  TBili  0.3  /  DBili  0.1  /  AST  11<L>  /  ALT  17  /  AlkPhos  99  11-29    UA/Urine Culture: neg    RADIOLOGY:  11/29/21: CT Enterography w/ Oral Cont and w/ IV Cont: Unremarkable CT enterography.    11/27/21: Xray Chest 1 View: AP radiograph of the chest demonstrates no evidence of infiltrate, pleural effusion or vascular congestion. No atelectasis is seen. The cardiac silhouette is normal in size. Osseous structures are intact. Impression: No active pulmonary disease.    11/29/21: ENDOSCOPY: Normal esophagus. Retained food in the stomach. Normal examined duodenum and jejunum. RECOMMENDATION: Await pathology results. Continue present medications. Patient has a contact number available for emergencies. The signs and symptoms of potential delayed.    MEDICATIONS  (STANDING):  apixaban 5 milliGRAM(s) Oral every 12 hours  atorvastatin 20 milliGRAM(s) Oral at bedtime  dronedarone 400 milliGRAM(s) Oral two times a day  famotidine    Tablet 20 milliGRAM(s) Oral daily  fluticasone propionate 50 MICROgram(s)/spray Nasal Spray 1 Spray(s) Both Nostrils two times a day  gemfibrozil 600 milliGRAM(s) Oral two times a day  lisinopril 10 milliGRAM(s) Oral daily  montelukast 10 milliGRAM(s) Oral at bedtime  multivitamin 1 Tablet(s) Oral daily  pantoprazole    Tablet 40 milliGRAM(s) Oral before breakfast  tamsulosin 0.4 milliGRAM(s) Oral at bedtime    MEDICATIONS  (PRN):  acetaminophen     Tablet .. 650 milliGRAM(s) Oral every 6 hours PRN Temp greater or equal to 38C (100.4F), Mild Pain (1 - 3)  aluminum hydroxide/magnesium hydroxide/simethicone Suspension 30 milliLiter(s) Oral every 4 hours PRN Dyspepsia  melatonin 3 milliGRAM(s) Oral at bedtime PRN Insomnia  zolpidem 5 milliGRAM(s) Oral at bedtime PRN Insomnia  zolpidem 5 milliGRAM(s) Oral at bedtime PRN Insomnia    Home Medications:  aspirin 81 mg oral tablet: 1 tab(s) orally once a day (27 Nov 2021 16:09)  famotidine 20 mg oral tablet: 1 tab(s) orally once a day (27 Nov 2021 16:09)  Flonase 50 mcg/inh nasal spray: 1 spray(s) nasal once a day (27 Nov 2021 16:09)  Fluad Quadrivalent PF [id number] intramuscular suspension: 0.5 milliliter(s) intramuscular once (27 Nov 2021 16:09)  gemfibrozil 600 mg oral tablet: 1 tab(s) orally 2 times a day (27 Nov 2021 16:09)  lisinopril 20 mg oral tablet: 1 tab(s) orally once a day (27 Nov 2021 16:09)  montelukast 10 mg oral tablet: 1 tab(s) orally once a day (27 Nov 2021 16:09)  Multaq 400 mg oral tablet: 1 tab(s) orally 2 times a day (27 Nov 2021 16:09)  Multiple Vitamins oral tablet: 1 tab(s) orally once a day (27 Nov 2021 16:09)  rosuvastatin 20 mg oral tablet: 1 tab(s) orally once a day (27 Nov 2021 16:09)  tamsulosin 0.4 mg oral capsule: 1 cap(s) orally once a day (27 Nov 2021 16:09)  Xarelto 20 mg oral tablet: 1 tab(s) orally once a day (27 Nov 2021 16:09)  zolpidem 12.5 mg oral tablet, extended release: 1 tab(s) orally once a day (at bedtime) (27 Nov 2021 16:09)   CHIEF COMPLAINT/DIAGNOSIS: dizziness     HPI: 69 yo male with Hx. of  CAD, Atrial fib. s/p Ablation, on Xarelto, anemia , developed melena 6 weeks ago, s/p recent EGD, capsule enteroscopy, Colonoscopy on 11/4/21 no bleeding source found,   presented in the ED for dizziness,  for 2 days and worse today. His Bp was low this am. He was planing to have Watchman device placed at [place] to be able to stop AC for atrial fib.    11/29/21: feeling well. no c/o. tolerating PO diet. still w/ black stools  11/30/21: still w/ black stools overnight - patient says appeared darker. advised him to stay overnight[facility]  for dr. White office w/ updates.  12/1/21: feeling well, no stools this AM. patient to call [facility] prior to d/ to arrange lab draws.    All other review of systems is negative unless indicated above    PHYSICAL EXAM:  Constitutional: NAD, awake and alert  HEENT: PERR, EOMI, Normal Hearing, MMM  Neck: Soft and supple, No LAD, No JVD  Respiratory: Breath sounds are clear bilaterally, No wheezing, rales or rhonchi  Cardiovascular: S1 and S2, regular rate and rhythm, no Murmurs, gallops or rubs  Gastrointestinal: Bowel Sounds present, soft, nontender, nondistended, no guarding, no rebound  Extremities: No peripheral edema  Vascular: 2+ peripheral pulses  Neurological: A/O x 3, no focal deficits  Musculoskeletal: 5/5 strength b/l upper and lower extremities  Skin: No rashes    Vital Signs Last 24 Hrs  T(C): 36.4 (01 Dec 2021 07:53), Max: 36.4 (30 Nov 2021 20:11)  T(F): 97.6 (01 Dec 2021 07:53), Max: 97.6 (30 Nov 2021 20:11)  HR: 62 (01 Dec 2021 07:53) (62 - 73)  BP: 114/63 (01 Dec 2021 07:53) (114/63 - 152/59)  BP(mean): --  RR: 18 (01 Dec 2021 07:53) (18 - 18)  SpO2: 97% (01 Dec 2021 07:53) (97% - 100%) ROOM AIR    LABS: All Labs Reviewed:                        8.4    7.73  )-----------( 331      ( 01 Dec 2021 06:54 )             27.5     11-30    137  |  107  |  21  ----------------------------<  98  4.4   |  26  |  0.98    Ca    8.5      30 Nov 2021 07:35  Phos  3.0     11-29  Mg     2.2     11-29    TPro  6.5  /  Alb  2.9<L>  /  TBili  0.3  /  DBili  0.1  /  AST  11<L>  /  ALT  17  /  AlkPhos  99  11-29    UA/Urine Culture: neg    RADIOLOGY:  11/29/21: CT Enterography w/ Oral Cont and w/ IV Cont: Unremarkable CT enterography.    11/27/21: Xray Chest 1 View: AP radiograph of the chest demonstrates no evidence of infiltrate, pleural effusion or vascular congestion. No atelectasis is seen. The cardiac silhouette is normal in size. Osseous structures are intact. Impression: No active pulmonary disease.    11/29/21: ENDOSCOPY: Normal esophagus. Retained food in the stomach. Normal examined duodenum and jejunum. RECOMMENDATION: Await pathology results. Continue present medications. Patient has a contact number available for emergencies. The signs and symptoms of potential delayed.    MEDICATIONS  (STANDING):  apixaban 5 milliGRAM(s) Oral every 12 hours  atorvastatin 20 milliGRAM(s) Oral at bedtime  dronedarone 400 milliGRAM(s) Oral two times a day  famotidine    Tablet 20 milliGRAM(s) Oral daily  fluticasone propionate 50 MICROgram(s)/spray Nasal Spray 1 Spray(s) Both Nostrils two times a day  gemfibrozil 600 milliGRAM(s) Oral two times a day  lisinopril 10 milliGRAM(s) Oral daily  montelukast 10 milliGRAM(s) Oral at bedtime  multivitamin 1 Tablet(s) Oral daily  pantoprazole    Tablet 40 milliGRAM(s) Oral before breakfast  tamsulosin 0.4 milliGRAM(s) Oral at bedtime    MEDICATIONS  (PRN):  acetaminophen     Tablet .. 650 milliGRAM(s) Oral every 6 hours PRN Temp greater or equal to 38C (100.4F), Mild Pain (1 - 3)  aluminum hydroxide/magnesium hydroxide/simethicone Suspension 30 milliLiter(s) Oral every 4 hours PRN Dyspepsia  melatonin 3 milliGRAM(s) Oral at bedtime PRN Insomnia  zolpidem 5 milliGRAM(s) Oral at bedtime PRN Insomnia  zolpidem 5 milliGRAM(s) Oral at bedtime PRN Insomnia    Home Medications:  aspirin 81 mg oral tablet: 1 tab(s) orally once a day (27 Nov 2021 16:09)  famotidine 20 mg oral tablet: 1 tab(s) orally once a day (27 Nov 2021 16:09)  Flonase 50 mcg/inh nasal spray: 1 spray(s) nasal once a day (27 Nov 2021 16:09)  Fluad Quadrivalent PF [id number] intramuscular suspension: 0.5 milliliter(s) intramuscular once (27 Nov 2021 16:09)  gemfibrozil 600 mg oral tablet: 1 tab(s) orally 2 times a day (27 Nov 2021 16:09)  lisinopril 20 mg oral tablet: 1 tab(s) orally once a day (27 Nov 2021 16:09)  montelukast 10 mg oral tablet: 1 tab(s) orally once a day (27 Nov 2021 16:09)  Multaq 400 mg oral tablet: 1 tab(s) orally 2 times a day (27 Nov 2021 16:09)  Multiple Vitamins oral tablet: 1 tab(s) orally once a day (27 Nov 2021 16:09)  rosuvastatin 20 mg oral tablet: 1 tab(s) orally once a day (27 Nov 2021 16:09)  tamsulosin 0.4 mg oral capsule: 1 cap(s) orally once a day (27 Nov 2021 16:09)  Xarelto 20 mg oral tablet: 1 tab(s) orally once a day (27 Nov 2021 16:09)  zolpidem 12.5 mg oral tablet, extended release: 1 tab(s) orally once a day (at bedtime) (27 Nov 2021 16:09)

## 2021-12-01 NOTE — PROGRESS NOTE ADULT - SUBJECTIVE AND OBJECTIVE BOX
Patient is a 70y old  Male who presents with a chief complaint of GI bleed, anemia sec. to acute blood loss (30 Nov 2021 10:16)      Subective:  No bleeding or any BMs yesterday or today  No pain or N/V.    PAST MEDICAL & SURGICAL HISTORY:  HTN (hypertension)    Nephrolithiasis    H/O bronchitis  COVID+ march 2020    Dark stools    2019 novel coronavirus disease (COVID-19)    H/O inguinal hernia repair    H/O cardiac radiofrequency ablation    H/O endoscopy    Chronic neck pain with history of cervical spinal surgery    H/O heart artery stent        MEDICATIONS  (STANDING):  apixaban 5 milliGRAM(s) Oral every 12 hours  atorvastatin 20 milliGRAM(s) Oral at bedtime  dronedarone 400 milliGRAM(s) Oral two times a day  famotidine    Tablet 20 milliGRAM(s) Oral daily  fluticasone propionate 50 MICROgram(s)/spray Nasal Spray 1 Spray(s) Both Nostrils two times a day  gemfibrozil 600 milliGRAM(s) Oral two times a day  lisinopril 10 milliGRAM(s) Oral daily  montelukast 10 milliGRAM(s) Oral at bedtime  multivitamin 1 Tablet(s) Oral daily  pantoprazole    Tablet 40 milliGRAM(s) Oral before breakfast  tamsulosin 0.4 milliGRAM(s) Oral at bedtime    MEDICATIONS  (PRN):  acetaminophen     Tablet .. 650 milliGRAM(s) Oral every 6 hours PRN Temp greater or equal to 38C (100.4F), Mild Pain (1 - 3)  aluminum hydroxide/magnesium hydroxide/simethicone Suspension 30 milliLiter(s) Oral every 4 hours PRN Dyspepsia  melatonin 3 milliGRAM(s) Oral at bedtime PRN Insomnia  zolpidem 5 milliGRAM(s) Oral at bedtime PRN Insomnia  zolpidem 5 milliGRAM(s) Oral at bedtime PRN Insomnia      REVIEW OF SYSTEMS:    RESPIRATORY: No shortness of breath  CARDIOVASCULAR: No chest pain  All other review of systems is negative unless indicated above.    Vital Signs Last 24 Hrs  T(C): 36.4 (30 Nov 2021 20:11), Max: 36.4 (30 Nov 2021 20:11)  T(F): 97.6 (30 Nov 2021 20:11), Max: 97.6 (30 Nov 2021 20:11)  HR: 73 (30 Nov 2021 20:11) (73 - 73)  BP: 152/59 (30 Nov 2021 20:11) (152/59 - 152/59)  BP(mean): --  RR: 18 (30 Nov 2021 20:11) (18 - 18)  SpO2: 100% (30 Nov 2021 20:11) (100% - 100%)    PHYSICAL EXAM:    Constitutional: NAD, well-developed  Respiratory: CTAB  Cardiovascular: S1 and S2, RRR  Gastrointestinal: BS+, soft, NT/ND  Extremities: No peripheral edema  Psychiatric: Normal mood, normal affect    LABS:                        8.4    7.73  )-----------( 331      ( 01 Dec 2021 06:54 )             27.5     11-30    137  |  107  |  21  ----------------------------<  98  4.4   |  26  |  0.98    Ca    8.5      30 Nov 2021 07:35  Phos  3.0     11-29  Mg     2.2     11-29    TPro  6.5  /  Alb  2.9<L>  /  TBili  0.3  /  DBili  0.1  /  AST  11<L>  /  ALT  17  /  AlkPhos  99  11-29      LIVER FUNCTIONS - ( 29 Nov 2021 09:52 )  Alb: 2.9 g/dL / Pro: 6.5 gm/dL / ALK PHOS: 99 U/L / ALT: 17 U/L / AST: 11 U/L / GGT: x

## 2021-12-01 NOTE — PROGRESS NOTE ADULT - PROVIDER SPECIALTY LIST ADULT
Hospitalist
Gastroenterology
Hospitalist
Hospitalist
Cardiology
Gastroenterology
Gastroenterology
Hospitalist
Cardiology

## 2021-12-01 NOTE — DISCHARGE NOTE NURSING/CASE MANAGEMENT/SOCIAL WORK - NSDCPEFALRISK_GEN_ALL_CORE
For information on Fall & Injury Prevention, visit: [email or web address] OR  [email or web address] OR  https://www.cdc.gov/steadi/patient.html

## 2021-12-01 NOTE — PROGRESS NOTE ADULT - ASSESSMENT
Imp:  Recurrent GI bleeding, likely small bowel  Stable now and H/H up today    Rec:  Stable for d/c by me  He should have regular H/H checks outpatient which I will try to arrange  Also -- med records have not arrived from [facility]. I went to website myself and didn't see a way for MD to request records so I gave patient links to "my chart" and department of medical records and asked him to get the records.

## 2021-12-01 NOTE — PROGRESS NOTE ADULT - ASSESSMENT
Melena- GI bleed- recurrent- probably small bowel   back on DOAC H/H stable       Anxiety- prn xanax.    Afib- no recurrence s/p ablation.    continue multaq.  Last ischemic workup was recent and neg.  pt is planned for Watchman device with [facility].    seems stable from a cardiac standpoint for DC

## 2021-12-01 NOTE — PHARMACOTHERAPY INTERVENTION NOTE - COMMENTS
Reviewed Eliquis w/ patient and provided w/ educational handout.  All questions answered
Medication history complete, reviewed with patient and confirmed with Chad
As per [facility] pharmacy co-pay for 1 month supply of Eliquis $0

## 2021-12-01 NOTE — DISCHARGE NOTE NURSING/CASE MANAGEMENT/SOCIAL WORK - PATIENT PORTAL LINK FT
You can access the FollowMyHealth Patient Portal offered by [facility] by registering at the following website: [email or web address]. By joining [facility]’s FollowMyHealth portal, you will also be able to view your health information using other applications (apps) compatible with our system.

## 2021-12-06 DIAGNOSIS — I10 ESSENTIAL (PRIMARY) HYPERTENSION: ICD-10-CM

## 2021-12-06 DIAGNOSIS — I48.91 UNSPECIFIED ATRIAL FIBRILLATION: ICD-10-CM

## 2021-12-06 DIAGNOSIS — K92.2 GASTROINTESTINAL HEMORRHAGE, UNSPECIFIED: ICD-10-CM

## 2021-12-06 DIAGNOSIS — Z79.82 LONG TERM (CURRENT) USE OF ASPIRIN: ICD-10-CM

## 2021-12-06 DIAGNOSIS — Z88.0 ALLERGY STATUS TO PENICILLIN: ICD-10-CM

## 2021-12-06 DIAGNOSIS — E78.5 HYPERLIPIDEMIA, UNSPECIFIED: ICD-10-CM

## 2021-12-06 DIAGNOSIS — F41.9 ANXIETY DISORDER, UNSPECIFIED: ICD-10-CM

## 2021-12-06 DIAGNOSIS — I25.10 ATHEROSCLEROTIC HEART DISEASE OF NATIVE CORONARY ARTERY WITHOUT ANGINA PECTORIS: ICD-10-CM

## 2021-12-06 DIAGNOSIS — Z86.16 PERSONAL HISTORY OF COVID-19: ICD-10-CM

## 2021-12-06 DIAGNOSIS — D62 ACUTE POSTHEMORRHAGIC ANEMIA: ICD-10-CM

## 2021-12-14 NOTE — ED PROVIDER NOTE - NS ED ATTENDING STATEMENT MOD
Addended by: BAY BLUNT on: 12/14/2021 11:02 AM     Modules accepted: Orders    
I have personally provided the amount of critical care time documented below excluding time spent on separate procedures.

## 2021-12-17 ENCOUNTER — OUTPATIENT (OUTPATIENT)
Dept: OUTPATIENT SERVICES | Facility: HOSPITAL | Age: 71
LOS: 1 days | End: 2021-12-17
Payer: COMMERCIAL

## 2021-12-17 ENCOUNTER — OUTPATIENT (OUTPATIENT)
Dept: OUTPATIENT SERVICES | Facility: HOSPITAL | Age: 71
LOS: 1 days | Discharge: HOME | End: 2021-12-17
Payer: COMMERCIAL

## 2021-12-17 DIAGNOSIS — Z98.890 OTHER SPECIFIED POSTPROCEDURAL STATES: Chronic | ICD-10-CM

## 2021-12-17 DIAGNOSIS — M54.2 CERVICALGIA: Chronic | ICD-10-CM

## 2021-12-17 DIAGNOSIS — Z95.5 PRESENCE OF CORONARY ANGIOPLASTY IMPLANT AND GRAFT: Chronic | ICD-10-CM

## 2021-12-17 DIAGNOSIS — D64.9 ANEMIA, UNSPECIFIED: ICD-10-CM

## 2021-12-17 DIAGNOSIS — Z20.828 CONTACT WITH AND (SUSPECTED) EXPOSURE TO OTHER VIRAL COMMUNICABLE DISEASES: ICD-10-CM

## 2021-12-17 PROBLEM — U07.1 COVID-19: Chronic | Status: ACTIVE | Noted: 2021-11-27

## 2021-12-17 LAB — SARS-COV-2 RNA SPEC QL NAA+PROBE: SIGNIFICANT CHANGE UP

## 2021-12-17 PROCEDURE — 86900 BLOOD TYPING SEROLOGIC ABO: CPT

## 2021-12-17 PROCEDURE — 36415 COLL VENOUS BLD VENIPUNCTURE: CPT

## 2021-12-17 PROCEDURE — C9803: CPT

## 2021-12-17 PROCEDURE — U0003: CPT

## 2021-12-17 PROCEDURE — 86901 BLOOD TYPING SEROLOGIC RH(D): CPT

## 2021-12-17 PROCEDURE — U0005: CPT

## 2021-12-17 PROCEDURE — 86850 RBC ANTIBODY SCREEN: CPT

## 2021-12-18 DIAGNOSIS — D64.9 ANEMIA, UNSPECIFIED: ICD-10-CM

## 2021-12-18 DIAGNOSIS — Z20.828 CONTACT WITH AND (SUSPECTED) EXPOSURE TO OTHER VIRAL COMMUNICABLE DISEASES: ICD-10-CM

## 2021-12-19 ENCOUNTER — OUTPATIENT (OUTPATIENT)
Dept: OUTPATIENT SERVICES | Facility: HOSPITAL | Age: 71
LOS: 1 days | Discharge: HOME | End: 2021-12-19
Payer: MEDICARE

## 2021-12-19 VITALS
SYSTOLIC BLOOD PRESSURE: 145 MMHG | RESPIRATION RATE: 19 BRPM | OXYGEN SATURATION: 100 % | HEART RATE: 69 BPM | TEMPERATURE: 97 F | DIASTOLIC BLOOD PRESSURE: 53 MMHG

## 2021-12-19 VITALS
TEMPERATURE: 98 F | RESPIRATION RATE: 19 BRPM | DIASTOLIC BLOOD PRESSURE: 53 MMHG | OXYGEN SATURATION: 100 % | SYSTOLIC BLOOD PRESSURE: 118 MMHG | HEART RATE: 70 BPM

## 2021-12-19 DIAGNOSIS — M54.2 CERVICALGIA: Chronic | ICD-10-CM

## 2021-12-19 DIAGNOSIS — Z98.890 OTHER SPECIFIED POSTPROCEDURAL STATES: Chronic | ICD-10-CM

## 2021-12-19 DIAGNOSIS — D64.9 ANEMIA, UNSPECIFIED: ICD-10-CM

## 2021-12-19 DIAGNOSIS — Z95.5 PRESENCE OF CORONARY ANGIOPLASTY IMPLANT AND GRAFT: Chronic | ICD-10-CM

## 2021-12-19 PROCEDURE — 86923 COMPATIBILITY TEST ELECTRIC: CPT

## 2021-12-19 PROCEDURE — P9016: CPT

## 2021-12-19 PROCEDURE — 36430 TRANSFUSION BLD/BLD COMPNT: CPT

## 2021-12-19 RX ORDER — ACETAMINOPHEN 500 MG
650 TABLET ORAL ONCE
Refills: 0 | Status: COMPLETED | OUTPATIENT
Start: 2021-12-19 | End: 2021-12-19

## 2021-12-19 RX ORDER — DIPHENHYDRAMINE HCL 50 MG
25 CAPSULE ORAL ONCE
Refills: 0 | Status: COMPLETED | OUTPATIENT
Start: 2021-12-19 | End: 2021-12-19

## 2021-12-19 RX ADMIN — Medication 25 MILLIGRAM(S): at 07:22

## 2021-12-19 RX ADMIN — Medication 650 MILLIGRAM(S): at 07:22

## 2022-08-22 NOTE — ED STATDOCS - CONDITION AT DISCHARGE:
----- Message from MIKE Muñoz sent at 8/17/2022  9:45 AM EDT -----  Please place patient in colon recall for 3 years given nature and amount of colon polyps.  Mail letter to patient and PCP.    Stomach biopsies normal.  Please make sure patient has a follow-up scheduled.   Satisfactory

## 2022-10-11 ENCOUNTER — TRANSCRIPTION ENCOUNTER (OUTPATIENT)
Age: 72
End: 2022-10-11

## 2022-10-11 ENCOUNTER — APPOINTMENT (OUTPATIENT)
Dept: GASTROENTEROLOGY | Facility: CLINIC | Age: 72
End: 2022-10-11

## 2022-10-11 VITALS
BODY MASS INDEX: 26.04 KG/M2 | WEIGHT: 186 LBS | SYSTOLIC BLOOD PRESSURE: 116 MMHG | HEIGHT: 71 IN | DIASTOLIC BLOOD PRESSURE: 70 MMHG | HEART RATE: 50 BPM

## 2022-10-11 DIAGNOSIS — R59.1 GENERALIZED ENLARGED LYMPH NODES: ICD-10-CM

## 2022-10-11 PROCEDURE — 99203 OFFICE O/P NEW LOW 30 MIN: CPT

## 2022-10-11 RX ORDER — GUSELKUMAB 100 MG/ML
100 INJECTION SUBCUTANEOUS
Qty: 1 | Refills: 3 | Status: DISCONTINUED | COMMUNITY
Start: 2020-07-13 | End: 2022-10-11

## 2022-10-11 RX ORDER — IXEKIZUMAB 80 MG/ML
80 INJECTION, SOLUTION SUBCUTANEOUS
Qty: 1 | Refills: 0 | Status: DISCONTINUED | COMMUNITY
Start: 2020-07-10 | End: 2022-10-11

## 2022-10-11 RX ORDER — IXEKIZUMAB 80 MG/ML
80 INJECTION, SOLUTION SUBCUTANEOUS
Qty: 6 | Refills: 1 | Status: DISCONTINUED | COMMUNITY
Start: 2020-07-10 | End: 2022-10-11

## 2022-10-11 RX ORDER — GUSELKUMAB 100 MG/ML
100 INJECTION SUBCUTANEOUS
Qty: 2 | Refills: 0 | Status: DISCONTINUED | COMMUNITY
Start: 2020-07-13 | End: 2022-10-11

## 2022-10-12 NOTE — ASSESSMENT
[FreeTextEntry1] : Plan:\par Discussed risks versus benefits as well as instructions for EUS, pt agrees to planned procedure. Discussed if unable to perform, may require surgical intervention, pt expressed understanding. All questions answered. Discussed with Dr. Winters. I have spent 30 minutes on this encounter.

## 2022-10-12 NOTE — HISTORY OF PRESENT ILLNESS
[FreeTextEntry1] : Juan Daniel Mccarthy is a 71 year old male presenting today for consult for EUS, sent from Dr. Mejia. Pt was having generalized abdominal discomfort, was sent for imaging and found to have multiple lymphadenopathies. Had IR biopsy of retroperitoneal lymph node that was normal. Also has periportal lymphadenopathy observed. Pt denies any GI complaints at this time including abdominal pain, nausea, vomiting, overt signs of bleeding. Takes 81 MG ASA daily.

## 2022-11-18 ENCOUNTER — OUTPATIENT (OUTPATIENT)
Dept: OUTPATIENT SERVICES | Facility: HOSPITAL | Age: 72
LOS: 1 days | End: 2022-11-18
Payer: COMMERCIAL

## 2022-11-18 VITALS
HEART RATE: 50 BPM | WEIGHT: 195.99 LBS | OXYGEN SATURATION: 100 % | DIASTOLIC BLOOD PRESSURE: 62 MMHG | RESPIRATION RATE: 16 BRPM | SYSTOLIC BLOOD PRESSURE: 146 MMHG | TEMPERATURE: 98 F | HEIGHT: 69 IN

## 2022-11-18 DIAGNOSIS — M54.2 CERVICALGIA: Chronic | ICD-10-CM

## 2022-11-18 DIAGNOSIS — Z95.5 PRESENCE OF CORONARY ANGIOPLASTY IMPLANT AND GRAFT: Chronic | ICD-10-CM

## 2022-11-18 DIAGNOSIS — Z98.890 OTHER SPECIFIED POSTPROCEDURAL STATES: Chronic | ICD-10-CM

## 2022-11-18 DIAGNOSIS — R59.1 GENERALIZED ENLARGED LYMPH NODES: ICD-10-CM

## 2022-11-18 DIAGNOSIS — Z95.818 PRESENCE OF OTHER CARDIAC IMPLANTS AND GRAFTS: Chronic | ICD-10-CM

## 2022-11-18 LAB
ANION GAP SERPL CALC-SCNC: 1 MMOL/L — LOW (ref 5–17)
APTT BLD: 42.2 SEC — HIGH (ref 27.5–35.5)
BASOPHILS # BLD AUTO: 0.08 K/UL — SIGNIFICANT CHANGE UP (ref 0–0.2)
BASOPHILS NFR BLD AUTO: 1.1 % — SIGNIFICANT CHANGE UP (ref 0–2)
BUN SERPL-MCNC: 18 MG/DL — SIGNIFICANT CHANGE UP (ref 7–23)
CALCIUM SERPL-MCNC: 8.8 MG/DL — SIGNIFICANT CHANGE UP (ref 8.5–10.1)
CHLORIDE SERPL-SCNC: 108 MMOL/L — SIGNIFICANT CHANGE UP (ref 96–108)
CO2 SERPL-SCNC: 29 MMOL/L — SIGNIFICANT CHANGE UP (ref 22–31)
CREAT SERPL-MCNC: 1.05 MG/DL — SIGNIFICANT CHANGE UP (ref 0.5–1.3)
EGFR: 76 ML/MIN/1.73M2 — SIGNIFICANT CHANGE UP
EOSINOPHIL # BLD AUTO: 0.44 K/UL — SIGNIFICANT CHANGE UP (ref 0–0.5)
EOSINOPHIL NFR BLD AUTO: 6 % — SIGNIFICANT CHANGE UP (ref 0–6)
GLUCOSE SERPL-MCNC: 106 MG/DL — HIGH (ref 70–99)
HCT VFR BLD CALC: 38 % — LOW (ref 39–50)
HGB BLD-MCNC: 12.3 G/DL — LOW (ref 13–17)
IMM GRANULOCYTES NFR BLD AUTO: 0.1 % — SIGNIFICANT CHANGE UP (ref 0–0.9)
INR BLD: 1.42 RATIO — HIGH (ref 0.88–1.16)
LYMPHOCYTES # BLD AUTO: 1.62 K/UL — SIGNIFICANT CHANGE UP (ref 1–3.3)
LYMPHOCYTES # BLD AUTO: 22.3 % — SIGNIFICANT CHANGE UP (ref 13–44)
MCHC RBC-ENTMCNC: 30.1 PG — SIGNIFICANT CHANGE UP (ref 27–34)
MCHC RBC-ENTMCNC: 32.4 GM/DL — SIGNIFICANT CHANGE UP (ref 32–36)
MCV RBC AUTO: 92.9 FL — SIGNIFICANT CHANGE UP (ref 80–100)
MONOCYTES # BLD AUTO: 0.82 K/UL — SIGNIFICANT CHANGE UP (ref 0–0.9)
MONOCYTES NFR BLD AUTO: 11.3 % — SIGNIFICANT CHANGE UP (ref 2–14)
NEUTROPHILS # BLD AUTO: 4.31 K/UL — SIGNIFICANT CHANGE UP (ref 1.8–7.4)
NEUTROPHILS NFR BLD AUTO: 59.2 % — SIGNIFICANT CHANGE UP (ref 43–77)
PLATELET # BLD AUTO: 206 K/UL — SIGNIFICANT CHANGE UP (ref 150–400)
POTASSIUM SERPL-MCNC: 4.8 MMOL/L — SIGNIFICANT CHANGE UP (ref 3.5–5.3)
POTASSIUM SERPL-SCNC: 4.8 MMOL/L — SIGNIFICANT CHANGE UP (ref 3.5–5.3)
PROTHROM AB SERPL-ACNC: 16.5 SEC — HIGH (ref 10.5–13.4)
RBC # BLD: 4.09 M/UL — LOW (ref 4.2–5.8)
RBC # FLD: 14.2 % — SIGNIFICANT CHANGE UP (ref 10.3–14.5)
SODIUM SERPL-SCNC: 138 MMOL/L — SIGNIFICANT CHANGE UP (ref 135–145)
WBC # BLD: 7.28 K/UL — SIGNIFICANT CHANGE UP (ref 3.8–10.5)
WBC # FLD AUTO: 7.28 K/UL — SIGNIFICANT CHANGE UP (ref 3.8–10.5)

## 2022-11-18 PROCEDURE — 93005 ELECTROCARDIOGRAM TRACING: CPT

## 2022-11-18 PROCEDURE — 85730 THROMBOPLASTIN TIME PARTIAL: CPT

## 2022-11-18 PROCEDURE — 85025 COMPLETE CBC W/AUTO DIFF WBC: CPT

## 2022-11-18 PROCEDURE — 80048 BASIC METABOLIC PNL TOTAL CA: CPT

## 2022-11-18 PROCEDURE — 85610 PROTHROMBIN TIME: CPT

## 2022-11-18 PROCEDURE — 93010 ELECTROCARDIOGRAM REPORT: CPT

## 2022-11-18 PROCEDURE — 99214 OFFICE O/P EST MOD 30 MIN: CPT | Mod: 25

## 2022-11-18 PROCEDURE — 36415 COLL VENOUS BLD VENIPUNCTURE: CPT

## 2022-11-18 RX ORDER — TAMSULOSIN HYDROCHLORIDE 0.4 MG/1
1 CAPSULE ORAL
Qty: 0 | Refills: 0 | DISCHARGE

## 2022-11-18 RX ORDER — ROSUVASTATIN CALCIUM 5 MG/1
1 TABLET ORAL
Qty: 0 | Refills: 0 | DISCHARGE

## 2022-11-18 RX ORDER — MONTELUKAST 4 MG/1
1 TABLET, CHEWABLE ORAL
Qty: 0 | Refills: 0 | DISCHARGE

## 2022-11-18 NOTE — H&P PST ADULT - NSICDXPASTMEDICALHX_GEN_ALL_CORE_FT
PAST MEDICAL HISTORY:  2019 novel coronavirus disease (COVID-19)     Anemia     Atrial fibrillation     BPH (benign prostatic hyperplasia)     Dark stools     Gallstone observed on PET Scan    Gastric bleed diagnosed and followed by Dr. Mejia    H/O bronchitis COVID+ march 2020, 11/2/2022    History of kidney stones     History of lymphadenopathy     HLD (hyperlipidemia)     HTN (hypertension)     Male erectile disorder     Nephrolithiasis     Psoriasis     Vertigo

## 2022-11-18 NOTE — H&P PST ADULT - GASTROINTESTINAL COMMENTS
due to Iron supplements. Hx. of GI bleed. Monitored by Dr. Mejia- blood transfusion x 2 in 2021. Left lateral abdominal pain. See HPI

## 2022-11-18 NOTE — H&P PST ADULT - HISTORY OF PRESENT ILLNESS
71 years old male with lymphadenopathy. He had left lateral abdominal pain "about 3 months ago". He was seen by Dr. Martell and was referred to Dr. Mejia. He had a colonoscopy x 2 and upper endoscopy x 2. PET Scan done indicating multiple lymphadenopathies. IR biopsies normal . Bone marrow biopsy also done. Planned EUS.

## 2022-11-18 NOTE — H&P PST ADULT - ASSESSMENT
71 years old male present to [facility] prior to EUS with Dr. Charles.    Plan  1. Expect a call from Endoscopy the day before your procedure between 11am and 3pm.  2. Follow the GI doctor's instructions for preparation  and day before procedure activities.  3. Follow the GI doctor's  instructions for medications.   4. Patient tested positive for Covid- 19 less than 90 days ago(11/2/2022) and therefore under the guidelines did not need a repeat Covid swab.  5. CBC, BMP, PT/ INR, PTT and EKG done in PST

## 2022-11-18 NOTE — H&P PST ADULT - NSANTHOSAYNRD_GEN_A_CORE
No. ISABELLA screening performed.  STOP BANG Legend: 0-2 = LOW Risk; 3-4 = INTERMEDIATE Risk; 5-8 = HIGH Risk

## 2022-11-18 NOTE — H&P PST ADULT - NSICDXPASTSURGICALHX_GEN_ALL_CORE_FT
PAST SURGICAL HISTORY:  Chronic neck pain with history of cervical spinal surgery     H/O cardiac radiofrequency ablation     H/O endoscopy upper and lower    H/O heart artery stent     H/O inguinal hernia repair     Presence of Watchman left atrial appendage closure device 12/ 2021. 5/ 2022- developed "they think a bloodclot in my heart". Presently on Blood thinners.

## 2022-11-19 DIAGNOSIS — R59.1 GENERALIZED ENLARGED LYMPH NODES: ICD-10-CM

## 2022-11-21 ENCOUNTER — RESULT REVIEW (OUTPATIENT)
Age: 72
End: 2022-11-21

## 2022-11-21 ENCOUNTER — TRANSCRIPTION ENCOUNTER (OUTPATIENT)
Age: 72
End: 2022-11-21

## 2022-11-21 ENCOUNTER — OUTPATIENT (OUTPATIENT)
Dept: INPATIENT UNIT | Facility: HOSPITAL | Age: 72
LOS: 1 days | Discharge: HOME | End: 2022-11-21
Payer: COMMERCIAL

## 2022-11-21 ENCOUNTER — APPOINTMENT (OUTPATIENT)
Dept: GASTROENTEROLOGY | Facility: HOSPITAL | Age: 72
End: 2022-11-21

## 2022-11-21 VITALS
OXYGEN SATURATION: 99 % | DIASTOLIC BLOOD PRESSURE: 54 MMHG | HEART RATE: 55 BPM | TEMPERATURE: 97 F | SYSTOLIC BLOOD PRESSURE: 103 MMHG | RESPIRATION RATE: 16 BRPM

## 2022-11-21 VITALS
SYSTOLIC BLOOD PRESSURE: 141 MMHG | HEART RATE: 60 BPM | OXYGEN SATURATION: 98 % | WEIGHT: 195.99 LBS | DIASTOLIC BLOOD PRESSURE: 70 MMHG | TEMPERATURE: 98 F | RESPIRATION RATE: 16 BRPM | HEIGHT: 69 IN

## 2022-11-21 DIAGNOSIS — Z98.890 OTHER SPECIFIED POSTPROCEDURAL STATES: Chronic | ICD-10-CM

## 2022-11-21 DIAGNOSIS — Z95.5 PRESENCE OF CORONARY ANGIOPLASTY IMPLANT AND GRAFT: Chronic | ICD-10-CM

## 2022-11-21 DIAGNOSIS — Z95.818 PRESENCE OF OTHER CARDIAC IMPLANTS AND GRAFTS: Chronic | ICD-10-CM

## 2022-11-21 DIAGNOSIS — R59.1 GENERALIZED ENLARGED LYMPH NODES: ICD-10-CM

## 2022-11-21 DIAGNOSIS — M54.2 CERVICALGIA: Chronic | ICD-10-CM

## 2022-11-21 PROCEDURE — 88312 SPECIAL STAINS GROUP 1: CPT | Mod: 26

## 2022-11-21 PROCEDURE — 88307 TISSUE EXAM BY PATHOLOGIST: CPT

## 2022-11-21 PROCEDURE — 88300 SURGICAL PATH GROSS: CPT

## 2022-11-21 PROCEDURE — 88104 CYTOPATH FL NONGYN SMEARS: CPT

## 2022-11-21 PROCEDURE — 43242 EGD US FINE NEEDLE BX/ASPIR: CPT

## 2022-11-21 PROCEDURE — 88312 SPECIAL STAINS GROUP 1: CPT

## 2022-11-21 PROCEDURE — 88104 CYTOPATH FL NONGYN SMEARS: CPT | Mod: 26

## 2022-11-21 PROCEDURE — 88305 TISSUE EXAM BY PATHOLOGIST: CPT

## 2022-11-21 PROCEDURE — 88307 TISSUE EXAM BY PATHOLOGIST: CPT | Mod: 26

## 2022-11-21 PROCEDURE — 88305 TISSUE EXAM BY PATHOLOGIST: CPT | Mod: 26

## 2022-11-21 PROCEDURE — 88300 SURGICAL PATH GROSS: CPT | Mod: 26,59

## 2022-11-21 RX ORDER — OMEGA-3 ACID ETHYL ESTERS 1 G
1 CAPSULE ORAL
Qty: 0 | Refills: 0 | DISCHARGE

## 2022-11-21 RX ORDER — TAMSULOSIN HYDROCHLORIDE 0.4 MG/1
1 CAPSULE ORAL
Qty: 0 | Refills: 0 | DISCHARGE

## 2022-11-21 RX ORDER — ASPIRIN/CALCIUM CARB/MAGNESIUM 324 MG
1 TABLET ORAL
Qty: 0 | Refills: 0 | DISCHARGE

## 2022-11-21 RX ORDER — ROSUVASTATIN CALCIUM 5 MG/1
1 TABLET ORAL
Qty: 0 | Refills: 0 | DISCHARGE

## 2022-11-21 RX ORDER — LISINOPRIL 2.5 MG/1
1 TABLET ORAL
Qty: 0 | Refills: 0 | DISCHARGE

## 2022-11-21 RX ORDER — SELENIOUS ACID 40 UG/ML
1 INJECTION, SOLUTION INTRAVENOUS
Qty: 0 | Refills: 0 | DISCHARGE

## 2022-11-21 RX ORDER — APIXABAN 2.5 MG/1
1 TABLET, FILM COATED ORAL
Qty: 0 | Refills: 0 | DISCHARGE

## 2022-11-21 RX ORDER — LECITHIN 1200 MG
1 CAPSULE ORAL
Qty: 0 | Refills: 0 | DISCHARGE

## 2022-11-21 RX ORDER — ZOLPIDEM TARTRATE 10 MG/1
1 TABLET ORAL
Qty: 0 | Refills: 0 | DISCHARGE

## 2022-11-21 RX ORDER — ASCORBIC ACID 60 MG
1 TABLET,CHEWABLE ORAL
Qty: 0 | Refills: 0 | DISCHARGE

## 2022-11-21 RX ORDER — MONTELUKAST 4 MG/1
1 TABLET, CHEWABLE ORAL
Qty: 0 | Refills: 0 | DISCHARGE

## 2022-11-21 RX ORDER — CHOLECALCIFEROL (VITAMIN D3) 125 MCG
1 CAPSULE ORAL
Qty: 0 | Refills: 0 | DISCHARGE

## 2022-11-21 RX ORDER — VITAMIN E 100 UNIT
1 CAPSULE ORAL
Qty: 0 | Refills: 0 | DISCHARGE

## 2022-11-21 RX ORDER — FERROUS SULFATE 325(65) MG
1 TABLET ORAL
Qty: 0 | Refills: 0 | DISCHARGE

## 2022-11-21 RX ORDER — PANTOPRAZOLE SODIUM 20 MG/1
1 TABLET, DELAYED RELEASE ORAL
Qty: 0 | Refills: 0 | DISCHARGE

## 2022-11-21 RX ORDER — METOPROLOL TARTRATE 50 MG
1 TABLET ORAL
Qty: 0 | Refills: 0 | DISCHARGE

## 2022-11-21 RX ORDER — MAGNESIUM CARBONATE 54 MG/5 ML
1 LIQUID (ML) ORAL
Qty: 0 | Refills: 0 | DISCHARGE

## 2022-11-21 RX ORDER — FLUTICASONE PROPIONATE 50 MCG
1 SPRAY, SUSPENSION NASAL
Qty: 0 | Refills: 0 | DISCHARGE

## 2022-11-21 NOTE — ASU DISCHARGE PLAN (ADULT/PEDIATRIC) - NS MD DC FALL RISK RISK
For information on Fall & Injury Prevention, visit: [email or web address] OR  [email or web address] OR  https://www.cdc.gov/steadi/patient.html

## 2022-11-23 LAB — SURGICAL PATHOLOGY STUDY: SIGNIFICANT CHANGE UP

## 2022-11-25 DIAGNOSIS — Z86.16 PERSONAL HISTORY OF COVID-19: ICD-10-CM

## 2022-11-25 DIAGNOSIS — K80.20 CALCULUS OF GALLBLADDER WITHOUT CHOLECYSTITIS WITHOUT OBSTRUCTION: ICD-10-CM

## 2022-11-25 DIAGNOSIS — Z87.891 PERSONAL HISTORY OF NICOTINE DEPENDENCE: ICD-10-CM

## 2022-11-25 DIAGNOSIS — I10 ESSENTIAL (PRIMARY) HYPERTENSION: ICD-10-CM

## 2022-11-25 DIAGNOSIS — R93.3 ABNORMAL FINDINGS ON DIAGNOSTIC IMAGING OF OTHER PARTS OF DIGESTIVE TRACT: ICD-10-CM

## 2022-11-25 DIAGNOSIS — Z95.818 PRESENCE OF OTHER CARDIAC IMPLANTS AND GRAFTS: ICD-10-CM

## 2022-11-25 DIAGNOSIS — Z79.01 LONG TERM (CURRENT) USE OF ANTICOAGULANTS: ICD-10-CM

## 2022-11-25 DIAGNOSIS — R59.0 LOCALIZED ENLARGED LYMPH NODES: ICD-10-CM

## 2022-11-25 DIAGNOSIS — I88.9 NONSPECIFIC LYMPHADENITIS, UNSPECIFIED: ICD-10-CM

## 2022-11-25 DIAGNOSIS — Z88.0 ALLERGY STATUS TO PENICILLIN: ICD-10-CM

## 2022-11-25 DIAGNOSIS — I20.9 ANGINA PECTORIS, UNSPECIFIED: ICD-10-CM

## 2022-11-25 DIAGNOSIS — Z79.82 LONG TERM (CURRENT) USE OF ASPIRIN: ICD-10-CM

## 2022-11-25 DIAGNOSIS — I48.91 UNSPECIFIED ATRIAL FIBRILLATION: ICD-10-CM

## 2022-11-25 DIAGNOSIS — N40.0 BENIGN PROSTATIC HYPERPLASIA WITHOUT LOWER URINARY TRACT SYMPTOMS: ICD-10-CM

## 2022-11-25 DIAGNOSIS — L40.9 PSORIASIS, UNSPECIFIED: ICD-10-CM

## 2022-11-25 DIAGNOSIS — D64.9 ANEMIA, UNSPECIFIED: ICD-10-CM

## 2022-11-29 ENCOUNTER — NON-APPOINTMENT (OUTPATIENT)
Age: 72
End: 2022-11-29

## 2024-01-10 NOTE — PATIENT PROFILE ADULT - NSASFALLNEEDSASSIST_GEN_A_NUR
Quality 138: Melanoma: Coordination Of Care: A treatment plan was communicated to the physicians providing continuing care within one month of diagnosis outlining: diagnosis, tumor thickness and a plan for surgery or alternate care. Detail Level: Detailed Quality 265: Biopsy Follow-Up: Biopsy results reviewed, communicated, tracked, and documented Quality 130: Documentation Of Current Medications In The Medical Record: Documentation of a medical reason(s) for not documenting, updating, or reviewing the patient’s current medications list (e.g., patient is in an urgent or emergent medical situation) Quality 137: Melanoma: Continuity Of Care - Recall System: Patient information entered into a recall system that includes: target date for the next exam specified AND a process to follow up with patients regarding missed or unscheduled appointments Additional Notes: patient unable to provide complete medication list no

## 2024-01-31 NOTE — ASU PATIENT PROFILE, ADULT - PATIENT KNOW
chest wall non-tender, breathing is unlabored without accessory muscle use, normal breath sounds
yes

## 2024-10-30 NOTE — H&P PST ADULT - LAST ECHOCARDIOGRAM
Diagnosis: Gastrointestinal hemorrhage, unspecified gastrointestinal hemorrhage type [[id number]]   Future Attending Provider: LEX KHAN [[id number]]   2022

## 2024-11-13 NOTE — ED ADULT TRIAGE NOTE - BP NONINVASIVE SYSTOLIC (MM HG)
In an effort to ensure that our patients [employer]Well, a Team Member has reviewed your chart and identified an opportunity to provide the best care possible. An attempt was made to discuss or schedule due or overdue Preventive or Chronic Condition care.Care Gaps identified: Hypertension and Wellness Visits.    The Outcome was Contact was made, care gap was discussed - see further documentation.   Type of Appointment needed: Medicare Wellness Visit  Spoke with patients daughter and informed of overdue MWV. Patients daughter stated she will call patient and have patient schedule.   Please assist patient schedule a MWV when patient calls back      146

## 2025-05-15 NOTE — ED STATDOCS - CHIEF COMPLAINT
Problem: Discharge Planning  Goal: Discharge to home or other facility with appropriate resources  Outcome: Progressing     Problem: Skin/Tissue Integrity  Goal: Skin integrity remains intact  Description: 1.  Monitor for areas of redness and/or skin breakdown2.  Assess vascular access sites hourly3.  Every 4-6 hours minimum:  Change oxygen saturation probe site4.  Every 4-6 hours:  If on nasal continuous positive airway pressure, respiratory therapy assess nares and determine need for appliance change or resting period  5/14/2025 1818 by Kerline Tse RN  Outcome: Progressing  Flowsheets (Taken 5/14/2025 0846)  Skin Integrity Remains Intact:   Monitor for areas of redness and/or skin breakdown   Every 4-6 hours minimum:  Change oxygen saturation probe site   Assess vascular access sites hourly     Problem: Safety - Adult  Goal: Free from fall injury  Outcome: Progressing     Problem: Respiratory - Adult  Goal: Achieves optimal ventilation and oxygenation  5/15/2025 0012 by Kanika Kumar RN  Outcome: Progressing  5/14/2025 1939 by Taisha Louis, RT  Outcome: Progressing  5/14/2025 1818 by Kerline Tse RN  Outcome: Progressing  Flowsheets (Taken 5/14/2025 0846)  Achieves optimal ventilation and oxygenation:   Assess for changes in respiratory status   Assess for changes in mentation and behavior   Position to facilitate oxygenation and minimize respiratory effort   Oxygen supplementation based on oxygen saturation or arterial blood gases   Initiate smoking cessation protocol as indicated     Problem: Cardiovascular - Adult  Goal: Maintains optimal cardiac output and hemodynamic stability  5/15/2025 0012 by Kanika Kumar, RN  Outcome: Progressing  5/14/2025 1818 by Kerline Tse RN  Outcome: Progressing  Flowsheets (Taken 5/14/2025 0846)  Maintains optimal cardiac output and hemodynamic stability:   Monitor blood pressure and heart rate   Monitor urine output and notify Licensed Independent  The patient is a 69y year old Male complaining of shortness of breath.

## 2025-07-16 ENCOUNTER — APPOINTMENT (OUTPATIENT)
Dept: PULMONOLOGY | Facility: CLINIC | Age: 75
End: 2025-07-16
Payer: COMMERCIAL

## 2025-07-16 PROCEDURE — 94060 EVALUATION OF WHEEZING: CPT | Mod: TC

## 2025-07-16 PROCEDURE — 94729 DIFFUSING CAPACITY: CPT | Mod: TC

## 2025-07-16 PROCEDURE — 94727 GAS DIL/WSHOT DETER LNG VOL: CPT | Mod: TC
